# Patient Record
Sex: FEMALE | Race: WHITE | NOT HISPANIC OR LATINO | Employment: OTHER | URBAN - METROPOLITAN AREA
[De-identification: names, ages, dates, MRNs, and addresses within clinical notes are randomized per-mention and may not be internally consistent; named-entity substitution may affect disease eponyms.]

---

## 2017-01-24 ENCOUNTER — TRANSFERRED RECORDS (OUTPATIENT)
Dept: HEALTH INFORMATION MANAGEMENT | Facility: CLINIC | Age: 57
End: 2017-01-24

## 2017-02-16 ENCOUNTER — OFFICE VISIT (OUTPATIENT)
Dept: FAMILY MEDICINE | Facility: CLINIC | Age: 57
End: 2017-02-16
Payer: COMMERCIAL

## 2017-02-16 VITALS
BODY MASS INDEX: 33.32 KG/M2 | WEIGHT: 176.5 LBS | TEMPERATURE: 97.9 F | OXYGEN SATURATION: 95 % | DIASTOLIC BLOOD PRESSURE: 78 MMHG | HEART RATE: 87 BPM | SYSTOLIC BLOOD PRESSURE: 120 MMHG | HEIGHT: 61 IN

## 2017-02-16 DIAGNOSIS — C50.911 MALIGNANT NEOPLASM OF RIGHT FEMALE BREAST, UNSPECIFIED SITE OF BREAST: Primary | ICD-10-CM

## 2017-02-16 DIAGNOSIS — Z83.2 FAMILY HISTORY OF FACTOR V LEIDEN MUTATION: ICD-10-CM

## 2017-02-16 PROCEDURE — 81241 F5 GENE: CPT | Performed by: FAMILY MEDICINE

## 2017-02-16 PROCEDURE — 99214 OFFICE O/P EST MOD 30 MIN: CPT | Performed by: FAMILY MEDICINE

## 2017-02-16 PROCEDURE — 36415 COLL VENOUS BLD VENIPUNCTURE: CPT | Performed by: FAMILY MEDICINE

## 2017-02-16 ASSESSMENT — ANXIETY QUESTIONNAIRES
7. FEELING AFRAID AS IF SOMETHING AWFUL MIGHT HAPPEN: NOT AT ALL
1. FEELING NERVOUS, ANXIOUS, OR ON EDGE: NOT AT ALL
5. BEING SO RESTLESS THAT IT IS HARD TO SIT STILL: NOT AT ALL
GAD7 TOTAL SCORE: 2
3. WORRYING TOO MUCH ABOUT DIFFERENT THINGS: NOT AT ALL
6. BECOMING EASILY ANNOYED OR IRRITABLE: NOT AT ALL
2. NOT BEING ABLE TO STOP OR CONTROL WORRYING: SEVERAL DAYS

## 2017-02-16 ASSESSMENT — PATIENT HEALTH QUESTIONNAIRE - PHQ9: 5. POOR APPETITE OR OVEREATING: SEVERAL DAYS

## 2017-02-16 NOTE — LETTER
February 23, 2017      Teresa Hahn  [address]  [facility] [address]        Dear MsEdward Hahn,    Please send her a copy of the report.    Enclosed is a copy of your recent results.    It looks like you are negative for Factor 5 Leiden.    Keep in touch!    Sincerely,     Yocasta Moreno MD

## 2017-02-16 NOTE — NURSING NOTE
"Chief Complaint   Patient presents with     Consult     questions regarding breast cancer        Initial /78 (BP Location: Right arm, Patient Position: Chair, Cuff Size: Adult Regular)  Pulse 87  Temp 97.9  F (36.6  C) (Oral)  Ht 5' 0.75\" (1.543 m)  Wt 176 lb 8 oz (80.1 kg)  SpO2 95%  BMI 33.62 kg/m2 Estimated body mass index is 33.62 kg/(m^2) as calculated from the following:    Height as of this encounter: 5' 0.75\" (1.543 m).    Weight as of this encounter: 176 lb 8 oz (80.1 kg).  Medication Reconciliation: complete   Lashawn Rob, VERA      "

## 2017-02-17 ENCOUNTER — CARE COORDINATION (OUTPATIENT)
Dept: CARE COORDINATION | Facility: CLINIC | Age: 57
End: 2017-02-17

## 2017-02-17 ASSESSMENT — ANXIETY QUESTIONNAIRES: GAD7 TOTAL SCORE: 2

## 2017-02-17 ASSESSMENT — PATIENT HEALTH QUESTIONNAIRE - PHQ9: SUM OF ALL RESPONSES TO PHQ QUESTIONS 1-9: 1

## 2017-02-17 NOTE — LETTER
Health Care Home - Access Care Plan    About Me  Patient Name:  Teresa Hahn    YOB: 1960  Age:                            56 year old   [place] MRN:         [id number] Telephone Information:     Home Phone [phone]   Mobile [phone]       Address:    [address]  [facility] [address] Email address:  [email or web address]      Emergency Contact(s)  Name Relationship Lgl Grd Work Phone Home Phone Mobile Phone   NAN COVINGTON Spouse   [phone]             Health Maintenance:      My Access Plan  Medical Emergency 911   Questions or concerns during clinic hours Primary Clinic Line, I will call the clinic directly:     24 Hour Appointment Line [phone] or  [phone])  (toll free)   24 Hour Nurse Line [phone] (toll free)   Questions or concerns outside clinic hours 24 Hour Appointment Line, I will call the after-hours on-call line:   [facility] [phone] or [phone]) (toll-free)   Preferred Urgent Care     Preferred Hospital     Preferred Pharmacy [facility] [place] [address]  [address]     Behavioral Health Crisis Line [facility], [phone] or [phone]     My Care Team Members  Patient Care Team       Relationship Specialty Notifications Start End    Yocasta Moreno MD PCP - General   1/14/00     Phone: [phone] Fax: [phone]         [facility] [address] [facility] [address]    Masters, Jeniffer NAPOLES, RN Clinic Care Coordinator  Admissions 2/17/17     Comment:  Ph: [phone]        My Medical and Care Information  Problem List   Patient Active Problem List   Diagnosis     Acute reaction to stress     Arthropathy     Obesity     GERD (gastroesophageal reflux disease)     CARDIOVASCULAR SCREENING; LDL GOAL LESS THAN 160     Anxiety     Health Care Home     TBI (traumatic brain injury) (H)     ADD (attention deficit disorder)     Major depressive disorder,  single episode in full remission (H)      Current Medications and Allergies:  See printed Medication Report

## 2017-02-17 NOTE — PROGRESS NOTES
"Clinic Care Coordination Contact  OUTREACH    Referral Information:  Referral Source: PCP  Reason for Contact: Initial outreach by CCRN to patient for the following referral from PCP:    Reason for Referral: Other: recent dx breast ca. Interested in resources, especially within [facility], such as support group or other.     Provide additional details for Care Coordination to best meet the patient's current needs: provide resources for new dx breast ca.    Clinical Staff have discussed the Care Coordination Referral with the patient and/or caregiver: yes    Care Conference: No     Universal Utilization:   ED Visits in last year: 0  Hospital visits in last year: 0  Last PCP appointment: 02/16/17  Missed Appointments:  (N/A)  Concerns: Resources for BRCA - support, education, coping, etc.  Multiple Providers or Specialists: Yes    Clinical Concerns:  Current Medical Concerns:    Patient recently diagnosed with BRCA.   See 02/16/2017 OV notes by PCP for further details.     Current Behavioral Concerns: Patient requesting resources that will aide with her own ability to cope with new diagnosis, in addition to resources that will help her to communicate with her family during this time.   She states that she is \"still coming to terms\" with the diagnosis as it is so new to her and her family.      Education Provided to patient:   CCRN discussed that each person leif differently, but that I will mail her resources to assist in her ability to cope in a way that she feels meets her and her family needs.   Clinical Pathway Name: None      Medication Management:  N/A     Functional Status:  Mobility Status: Independent  Equipment Currently Used at Home: none        Psychosocial:  Current living arrangement:: I live in a private home with family  Financial/Insurance: [facility] [place]   Patient states that she lives with her spouse.    She has 4 children -  25 y/o daughter (, no kids of her own yet), 21 y/o son (college), 20 y/o son " (currently in [place] for [employer]), 18 y/o son (lives at home).        Resources and Interventions:  Current Resources:  (N/A);  (N/A)  PAS Number:  (N/A)  Senior Linkage Line Referral Placed:  (N/A)  Advanced Care Plans/Directives on file:: No  Referrals Placed: Community Resources     Goals:   Goal 1 Statement: I will review resources for BRCA and outreach to the ones that I feel support my needs at this time.   Goal 1 Progression Percent: 20%  Goal 1 Progression Date: 02/17/17     Frequency of Care Coordination: Q 2-4 weeks  Upcoming appointment: 03/07/17 (Preop with PCP )        Plan:  [facility] Care Plan, copy of DAP (see letters - dated 08/11/2016) and Rani Baker - [place] Cancer Resources list and Breastcancer.org (website) information mailed to home address on file.   CCRN will outreach to patient in 2 weeks to allow her time to review resources personally and with her family.  Will remain available to patient in that timeframe should she need anything.       Patient/Caregiver understanding: Patient agreed with plan.     Jeniffer Veliz, GISELLA  [facility]  Clinic Care Coordinator - [facility] Locations   Direct:  [phone] (voicemail available)

## 2017-02-21 ENCOUNTER — TRANSFERRED RECORDS (OUTPATIENT)
Dept: HEALTH INFORMATION MANAGEMENT | Facility: CLINIC | Age: 57
End: 2017-02-21

## 2017-02-21 LAB — COPATH REPORT: NORMAL

## 2017-02-22 ENCOUNTER — ALLIED HEALTH/NURSE VISIT (OUTPATIENT)
Dept: NURSING | Facility: CLINIC | Age: 57
End: 2017-02-22
Payer: COMMERCIAL

## 2017-02-22 DIAGNOSIS — Z23 NEED FOR PROPHYLACTIC VACCINATION AND INOCULATION AGAINST INFLUENZA: Primary | ICD-10-CM

## 2017-02-22 PROBLEM — C50.911 MALIGNANT NEOPLASM OF RIGHT FEMALE BREAST, UNSPECIFIED SITE OF BREAST: Status: ACTIVE | Noted: 2017-02-22

## 2017-02-22 PROCEDURE — 99207 ZZC NO CHARGE NURSE ONLY: CPT

## 2017-02-22 PROCEDURE — 90686 IIV4 VACC NO PRSV 0.5 ML IM: CPT

## 2017-02-22 PROCEDURE — 90471 IMMUNIZATION ADMIN: CPT

## 2017-02-22 NOTE — PROGRESS NOTES
Injectable Influenza Immunization Documentation    1.  Is the person to be vaccinated sick today?  No    2. Does the person to be vaccinated have an allergy to eggs or to a component of the vaccine?  No    3. Has the person to be vaccinated today ever had a serious reaction to influenza vaccine in the past?  No    4. Has the person to be vaccinated ever had Guillain-Milford syndrome?  No     Form completed by Anna Felton MA

## 2017-02-22 NOTE — MR AVS SNAPSHOT
"              After Visit Summary   2/22/2017    Teresa Hahn    MRN: [id number]           Patient Information     Date Of Birth          1960        Visit Information        Provider Department      2/22/2017 11:00 AM  NURSE [facility]        Today's Diagnoses     Need for prophylactic vaccination and inoculation against influenza    -  1       Follow-ups after your visit        Your next 10 appointments already scheduled     Mar 07, 2017 10:10 AM CST   Pre-Op physical with Yocasta Moreno MD   [facility]    [address] [facility] [address] [phone]              Who to contact     If you have questions or need follow up information about today's clinic visit or your schedule please contact [facility] directly at [phone].  Normal or non-critical lab and imaging results will be communicated to you by MyChart, letter or phone within 4 business days after the clinic has received the results. If you do not hear from us within 7 days, please contact the clinic through MyChart or phone. If you have a critical or abnormal lab result, we will notify you by phone as soon as possible.  Submit refill requests through [employer] or call your pharmacy and they will forward the refill request to us. Please allow 3 business days for your refill to be completed.          Additional Information About Your Visit        MyChart Information     [employer] lets you send messages to your doctor, view your test results, renew your prescriptions, schedule appointments and more. To sign up, go to [email or web address] . Click on \"Log in\" on the left side of the screen, which will take you to the Welcome page. Then click on \"Sign up Now\" on the right side of the page.     You will be asked to enter the access code listed below, as well as some personal information. Please follow the directions to create your username and password.     Your " access code is: [email or web address]  Expires: 2017  4:33 PM     Your access code will  in 90 days. If you need help or a new code, please call your [place] clinic or [phone].        Care EveryWhere ID     This is your Care EveryWhere ID. This could be used by other organizations to access your [facility] medical records  [id number]         Blood Pressure from Last 3 Encounters:   17 120/78   16 120/70   16 102/70    Weight from Last 3 Encounters:   17 176 lb 8 oz (80.1 kg)   16 175 lb 9.6 oz (79.7 kg)   16 175 lb 6.4 oz (79.6 kg)              We Performed the Following     FLU VAC, SPLIT VIRUS IM > 3 YO (QUADRIVALENT) [91654]     Vaccine Administration, Initial [83599]        Primary Care Provider Office Phone # Fax #    Yocasta Moreno [phone]       [facility] [address] [facility] [address]        Thank you!     Thank you for choosing [facility]  for your care. Our goal is always to provide you with excellent care. Hearing back from our patients is one way we can continue to improve our services. Please take a few minutes to complete the written survey that you may receive in the mail after your visit with us. Thank you!             Your Updated Medication List - Protect others around you: Learn how to safely use, store and throw away your medicines at www.disposemymeds.org.      Notice  As of 2017 11:14 AM    You have not been prescribed any medications.

## 2017-03-01 ENCOUNTER — TRANSFERRED RECORDS (OUTPATIENT)
Dept: HEALTH INFORMATION MANAGEMENT | Facility: CLINIC | Age: 57
End: 2017-03-01

## 2017-03-07 ENCOUNTER — OFFICE VISIT (OUTPATIENT)
Dept: FAMILY MEDICINE | Facility: CLINIC | Age: 57
End: 2017-03-07
Payer: COMMERCIAL

## 2017-03-07 VITALS
DIASTOLIC BLOOD PRESSURE: 74 MMHG | RESPIRATION RATE: 16 BRPM | TEMPERATURE: 98 F | BODY MASS INDEX: 33.27 KG/M2 | WEIGHT: 176.2 LBS | SYSTOLIC BLOOD PRESSURE: 118 MMHG | OXYGEN SATURATION: 97 % | HEIGHT: 61 IN | HEART RATE: 79 BPM

## 2017-03-07 DIAGNOSIS — C50.911 MALIGNANT NEOPLASM OF RIGHT FEMALE BREAST, UNSPECIFIED SITE OF BREAST: ICD-10-CM

## 2017-03-07 DIAGNOSIS — Z87.898 HISTORY OF ANESTHESIA REACTION: ICD-10-CM

## 2017-03-07 DIAGNOSIS — R11.2 PONV (POSTOPERATIVE NAUSEA AND VOMITING): ICD-10-CM

## 2017-03-07 DIAGNOSIS — Z98.890 PONV (POSTOPERATIVE NAUSEA AND VOMITING): ICD-10-CM

## 2017-03-07 DIAGNOSIS — Z01.818 PREOP GENERAL PHYSICAL EXAM: Primary | ICD-10-CM

## 2017-03-07 LAB
ERYTHROCYTE [DISTWIDTH] IN BLOOD BY AUTOMATED COUNT: 12.8 % (ref 10–15)
HCT VFR BLD AUTO: 44.3 % (ref 35–47)
HGB BLD-MCNC: 15 G/DL (ref 11.7–15.7)
MCH RBC QN AUTO: 28.8 PG (ref 26.5–33)
MCHC RBC AUTO-ENTMCNC: 33.9 G/DL (ref 31.5–36.5)
MCV RBC AUTO: 85 FL (ref 78–100)
PLATELET # BLD AUTO: 170 10E9/L (ref 150–450)
RBC # BLD AUTO: 5.2 10E12/L (ref 3.8–5.2)
WBC # BLD AUTO: 8.5 10E9/L (ref 4–11)

## 2017-03-07 PROCEDURE — 99214 OFFICE O/P EST MOD 30 MIN: CPT | Performed by: FAMILY MEDICINE

## 2017-03-07 PROCEDURE — 85027 COMPLETE CBC AUTOMATED: CPT | Performed by: FAMILY MEDICINE

## 2017-03-07 PROCEDURE — 36415 COLL VENOUS BLD VENIPUNCTURE: CPT | Performed by: FAMILY MEDICINE

## 2017-03-07 NOTE — MR AVS SNAPSHOT
After Visit Summary   3/7/2017    Teresa Hahn    MRN: [id number]           Patient Information     Date Of Birth          1960        Visit Information        Provider Department      3/7/2017 10:10 AM Yocasta Moreno MD [facility]        Today's Diagnoses     Preop general physical exam    -  1    Malignant neoplasm of right female breast, unspecified site of breast (H)        History of anesthesia reaction        PONV (postoperative nausea and vomiting)          Care Instructions      Before Your Surgery      Call your surgeon if there is any change in your health. This includes signs of a cold or flu (such as a sore throat, runny nose, cough, rash or fever).    Do not smoke, drink alcohol or take over the counter medicine (unless your surgeon or primary care doctor tells you to) for the 24 hours before and after surgery.    If you take prescribed drugs: Follow your doctor s orders about which medicines to take and which to stop until after surgery.    Eating and drinking prior to surgery: follow the instructions from your surgeon    Take a shower or bath the night before surgery. Use the soap your surgeon gave you to gently clean your skin. If you do not have soap from your surgeon, use your regular soap. Do not shave or scrub the surgery site.  Wear clean pajamas and have clean sheets on your bed.         Follow-ups after your visit        Who to contact     If you have questions or need follow up information about today's clinic visit or your schedule please contact [facility] directly at [phone].  Normal or non-critical lab and imaging results will be communicated to you by MyChart, letter or phone within 4 business days after the clinic has received the results. If you do not hear from us within 7 days, please contact the clinic through MyChart or phone. If you have a critical or abnormal lab result, we will notify you by phone as soon as  "possible.  Submit refill requests through [employer] or call your pharmacy and they will forward the refill request to us. Please allow 3 business days for your refill to be completed.          Additional Information About Your Visit        [email or web address] Information     [employer] lets you send messages to your doctor, view your test results, renew your prescriptions, schedule appointments and more. To sign up, go to [email or web address] . Click on \"Log in\" on the left side of the screen, which will take you to the Welcome page. Then click on \"Sign up Now\" on the right side of the page.     You will be asked to enter the access code listed below, as well as some personal information. Please follow the directions to create your username and password.     Your access code is: [email or web address]  Expires: 2017  4:33 PM     Your access code will  in 90 days. If you need help or a new code, please call your [place] clinic or [phone].        Care EveryWhere ID     This is your Care EveryWhere ID. This could be used by other organizations to access your [place] medical records  [id number]        Your Vitals Were     Pulse Temperature Respirations Height Pulse Oximetry BMI (Body Mass Index)    79 98  F (36.7  C) (Oral) 16 5' 0.75\" (1.543 m) 97% 33.57 kg/m2       Blood Pressure from Last 3 Encounters:   17 118/74   17 120/78   16 120/70    Weight from Last 3 Encounters:   17 176 lb 3.2 oz (79.9 kg)   17 176 lb 8 oz (80.1 kg)   16 175 lb 9.6 oz (79.7 kg)              We Performed the Following     CBC with platelets        Primary Care Provider Office Phone # Fax #    Yocasta Moreno [phone]       [facility] [address]  [facility] [address]        Thank you!     Thank you for choosing [facility]  for your care. Our goal is always to provide you with excellent care. Hearing back from our patients is one way we can continue to " improve our services. Please take a few minutes to complete the written survey that you may receive in the mail after your visit with us. Thank you!             Your Updated Medication List - Protect others around you: Learn how to safely use, store and throw away your medicines at www.disposemymeds.org.      Notice  As of 3/7/2017 11:02 AM    You have not been prescribed any medications.

## 2017-03-07 NOTE — LETTER
March 7, 2017      Teresa Hahn  [address]  [facility] [address]        Dear MsEdward Hahn,    Enclosed is a copy of your recent results.    All looks normal.    I hope you have a smooth recovery after surgery!    Keep in touch!    Sincerely,     Yocasta Moreno MD

## 2017-03-07 NOTE — NURSING NOTE
"Chief Complaint   Patient presents with     Pre-Op Exam       Initial /74 (BP Location: Right arm, Patient Position: Chair, Cuff Size: Adult Large)  Pulse 79  Temp 98  F (36.7  C) (Oral)  Resp 16  Ht 5' 0.75\" (1.543 m)  Wt 176 lb 3.2 oz (79.9 kg)  SpO2 97%  BMI 33.57 kg/m2 Estimated body mass index is 33.57 kg/(m^2) as calculated from the following:    Height as of this encounter: 5' 0.75\" (1.543 m).    Weight as of this encounter: 176 lb 3.2 oz (79.9 kg).  Medication Reconciliation: complete   Anna Felton MA      "

## 2017-03-07 NOTE — PROGRESS NOTES
[facility]  [address] [facility] [address] [phone]  Dept: [phone]    PRE-OP EVALUATION:  Today's date: 3/7/2017    Teresa Hahn (: 1960) presents for pre-operative evaluation assessment as requested by Dr. Ross and Dr. Gould.  She requires evaluation and anesthesia risk assessment prior to undergoing surgery/procedure for treatment .  Proposed procedure: Mastectomy and Reconstruction.    Date of Surgery/ Procedure: 17 - Mastectomy and Reconstruction   Time of Surgery/ Procedure: 7:30am  Hospital/Surgical Facility: [facility]   Fax number for surgical facility: [phone] or [phone]  Primary Physician: Yocasta Moreno  Type of Anesthesia Anticipated: General    Patient has a Health Care Directive or Living Will:  NO    1. NO - Do you have a history of heart attack, stroke, stent, bypass or surgery on an artery in the head, neck, heart or legs?  2. NO - Do you ever have any pain or discomfort in your chest?  3. NO - Do you have a history of  Heart Failure?  4. NO - Are you troubled by shortness of breath when: walking on the level, up a slight hill or at night?  5. NO - Do you currently have a cold, bronchitis or other respiratory infection?  6. NO - Do you have a cough, shortness of breath or wheezing?  7. YES - believes out of shape. Do you sometimes get pains in the calves of your legs when you walk?  8. NO - Do you or anyone in your family have previous history of blood clots?  9. NO - Do you or does anyone in your family have a serious bleeding problem such as prolonged bleeding following surgeries or cuts?  10. NO - Have you ever had problems with anemia or been told to take iron pills?  11. NO - Have you had any abnormal blood loss such as black, tarry or bloody stools, or abnormal vaginal bleeding?  12. YES - 3rd childbirth had bleeding.  Have you ever had a blood transfusion?  13. YES - has had itching and low blood  pressure to something. Has seen Allergist; they have requested records from [facility] and from the colonoscopy and from ankle surgery.  First incidence was after child 4 was born.  Also in 2012 from colonoscopy; had itchiness.  Was put in trendelenburg.  No problems with ankle fracture.   Have you or any of your relatives ever had problems with anesthesia?  14. YES - snoring. Believes wakes self up. Has not had a sleep eval.                     Do you have sleep apnea, excessive snoring or daytime drowsiness?  15. NO - Do you have any prosthetic heart valves?  16. NO - Do you have prosthetic joints?  17. NO - Is there any chance that you may be pregnant?      HPI:                                                      Brief HPI related to upcoming procedure: was found to have breast cancer.       See problem list for active medical problems.  Problems all longstanding and stable, except as noted/documented.  See ROS for pertinent symptoms related to these conditions.                                                                                                  .    MEDICAL HISTORY:                                                      Patient Active Problem List    Diagnosis Date Noted     Malignant neoplasm of right female breast, unspecified site of breast (H) 02/22/2017     Priority: Medium     Major depressive disorder, single episode in full remission (H) 04/09/2014     ADD (attention deficit disorder) 04/12/2013     TBI (traumatic brain injury) (H) 12/28/2012     age 2. coma x 3 weeks.       Health Care Home 11/10/2010     Care Coordination Post ED Assessment    Utilization:  No utilization concerns.  Has f/u with primary clinic tomorrow.    Disease State:  No noticeable improvement in the palsy since beginning treatment.  She had many questions about Bell's Palsy, and education was provided.  Patient stated understanding.        Medications:  Educated patient on indications for treatment with Valtrex, and  Prednisone, and reviewed potential side effects.  Patient stated understanding.    Functionality:No concerns.    Psychosocial:Patient is in good spirits.      Plan:  No further follow up needed at this time.   Bambi Bill RN, PHN,[facility]            DX V65.8 REPLACED WITH 82864 HEALTH CARE HOME (04/08/2013)       Anxiety 05/18/2010     CARDIOVASCULAR SCREENING; LDL GOAL LESS THAN 160 02/10/2010     GERD (gastroesophageal reflux disease) 05/20/2008     Obesity 07/20/2007     Problem list name updated by automated process. Provider to review       Arthropathy      from right ankle fracture.  Problem list name updated by automated process. Provider to review       Acute reaction to stress 04/19/2006     Problem list name updated by automated process. Provider to review        Past Medical History   Diagnosis Date     arthritis      from right ankle fracture.     TBI (traumatic brain injury) (H) 12/28/2012     age 2. coma x 3 weeks.      Past Surgical History   Procedure Laterality Date     Surgical history of -   12/2004     r leg broken in several areas; now arthritis. 3 surgeries     Surgical history of -   2008     right ankle fusion     No current outpatient prescriptions on file.     OTC products: None, except as noted above    No Known Allergies   Latex Allergy: NO    Social History   Substance Use Topics     Smoking status: Never Smoker     Smokeless tobacco: Never Used     Alcohol use Yes      Comment: occasional     History   Drug Use No       REVIEW OF SYSTEMS:                                                    C: NEGATIVE for fever, chills, change in weight  E/M: NEGATIVE for ear, mouth and throat problems x when wakes up in am.  R: NEGATIVE for significant cough or SOB  CV: NEGATIVE for chest pain, palpitations or peripheral edema  No nausea, vomitting or change in bowel habits.  No urinary symptoms.      EXAM:                                                    /74 (BP Location: Right arm, Patient  "Position: Chair, Cuff Size: Adult Large)  Pulse 79  Temp 98  F (36.7  C) (Oral)  Resp 16  Ht 5' 0.75\" (1.543 m)  Wt 176 lb 3.2 oz (79.9 kg)  SpO2 97%  BMI 33.57 kg/m2  GENERAL APPEARANCE: alert and no distress  HENT: ear canals and TM's normal and nose and mouth without ulcers or lesions  RESP: lungs clear to auscultation - no rales, rhonchi or wheezes  CV: regular rates and rhythm  ABDOMEN: soft, nontender, no HSM or masses and bowel sounds normal  MS: extremities normal- no gross deformities noted  NEURO: Normal strength and tone, sensory exam grossly normal, mentation intact and speech normal  PSYCH: mentation appears normal and affect normal/bright    DIAGNOSTICS:                                                      Labs Resulted Today:   Results for orders placed or performed in visit on 03/07/17   CBC with platelets   Result Value Ref Range    WBC 8.5 4.0 - 11.0 10e9/L    RBC Count 5.20 3.8 - 5.2 10e12/L    Hemoglobin 15.0 11.7 - 15.7 g/dL    Hematocrit 44.3 35.0 - 47.0 %    MCV 85 78 - 100 fl    MCH 28.8 26.5 - 33.0 pg    MCHC 33.9 31.5 - 36.5 g/dL    RDW 12.8 10.0 - 15.0 %    Platelet Count 170 150 - 450 10e9/L       Recent Labs   Lab Test  11/05/10   1120   HGB  13.4   PLT  174   NA  140   POTASSIUM  4.2   CR  0.81        IMPRESSION:                                                    Reason for surgery/procedure: breast cancer.    The proposed surgical procedure is considered INTERMEDIATE risk.    REVISED CARDIAC RISK INDEX  The patient has the following serious cardiovascular risks for perioperative complications such as (MI, PE, VFib and 3  AV Block):  No serious cardiac risks  INTERPRETATION: 0 risks: Class I (very low risk - 0.4% complication rate)    The patient has the following additional risks for perioperative complications:        Preop general physical exam      Malignant neoplasm of right female breast, unspecified site of breast (H)  Anticipating surgery.    History of anesthesia reaction  see " history above (#13); has had some itching and low blood pressure with some of her previous procedures.  she has pursued seeing an allergist who is reviewing her records related to itching and low blood pressure.   She will connect with the Allergist prior to surgery.    PONV (postoperative nausea and vomiting)  For Anesthesia to be aware .      RECOMMENDATIONS:                                                        Obstructive Sleep Apnea (or suspected sleep apnea)  Hospital staff are advised to monitor for sleep related oxygen desaturations due to suspicion of SHARDA          APPROVAL GIVEN to proceed with proposed procedure, without further diagnostic evaluation       Signed Electronically by: Yocasta Moreno MD, MD    Copy of this evaluation report is provided to requesting physician.    [place] Preop Guidelines

## 2017-03-09 ENCOUNTER — CARE COORDINATION (OUTPATIENT)
Dept: CARE COORDINATION | Facility: CLINIC | Age: 57
End: 2017-03-09

## 2017-03-09 NOTE — PROGRESS NOTES
NOTE:  Per 2017 OV notes by Dr. Moreno -   PRE-OP EVALUATION:  Today's date: 3/7/2017     Teresa Hahn (: 1960) presents for pre-operative evaluation assessment as requested by Dr. Ross and Dr. Gould. She requires evaluation and anesthesia risk assessment prior to undergoing surgery/procedure for treatment . Proposed procedure: Mastectomy and Reconstruction.     Date of Surgery/ Procedure: 17 - Mastectomy and Reconstruction   Time of Surgery/ Procedure: 7:30am  Hospital/Surgical Facility: [facility]   Fax number for surgical facility: [phone] or [phone]  Primary Physician: Yocasta Moreno  Type of Anesthesia Anticipated: General    _________________________________________________________________________________________________________________________________    Clinic Care Coordination Contact  OUTREACH    Referral Information:  Referral Source: PCP  Reason for Contact:   Follow up.          Universal Utilization:   ED Visits in last year: 0  Hospital visits in last year: 0  Last PCP appointment: 17 (Preop with Dr. Moreno )  Missed Appointments:  (N/A)  Concerns: Resources for BRCA - support, education, coping, etc.  Multiple Providers or Specialists: Yes    Clinical Concerns:  Current Medical Concerns: See above - patient is scheduled to have unilateral mastectomy (RIGHT breast) on 2017.    She is concerned about postoperative pain and how she will manage that.   She may be interested in Integrated Medicine or Pain Management postoperatively - would like to potentially have Healing Touch/massage therapy, aromatherapy, etc. As mechanisms in her treatment plan to cope with pain.   She verbalized understanding that some of these modalities may not be covered by insurance.     She is additionally concerned about how she will sleep once the surgery is done - she is typically a side sleeper, but noted that she will need to sleep on her back for a few weeks  "afterwards.   CCRN emphasized importance of having a proper pillow for best support while sleeping on back.   Advised that pain may be the biggest factor in this process as typically, pain is a trigger for wakefullness, especially when trying to sleep in an improper position.      She is experimenting with different pillows at this time.   We discussed the possibility that her IP Care Team may recommend Home Care after her surgery, but that will be at their discretion based on how she is doing before DC to home.   She verbalized understanding.     Current Behavioral Concerns:   Patient is coping well at this time with her plan for surgical intervention of BRCA.     She made the decision to have a UNILATERAL mastectomy to affected breast only at this time.   She states that it had to be \"meaningful to me, not just medically\".    Patient states that she has been \"circling back\" on her decision and has had personal reaffirmations of decision-making process.  She is focusing on \"life adjustments\" and looking to find focus postoperatively of \"not just doing but being.\"   She made the choice to have her procedure within the [place] metro area as it was significantly important for her to have the support of her 26 y/o daughter present for her as well.   \"I really wanted her to be a part of this process as this will be part of her story too.\"   Patient has been working on projects in the home this past week and leading up to the surgery - cleaned bedroom, working on decorating bedroom, etc - to make surroundings most comfortable.   She is hoping to do \"something fun\" this weekend as this will be her last pain-free weekend for awhile.       Education Provided to patient: CCRN provided patient with affirmations to her points made above.  Advised searching for pillows for back-sleep in addition to postoperative mastectomy pillows - reviewed these may vary based on personal preference and price point.    Encouraged patient " to continue to explore and verbalize her feelings through this process for best coping.       Clinical Pathway Name: None    Medication Management:  No new concerns identified at this time.     Functional Status:  Mobility Status: Independent  Equipment Currently Used at Home: none      Psychosocial:  Current living arrangement:: I live in a private home with family  Financial/Insurance:  [employer] [place]     Resources and Interventions:  Current Resources:  (N/A);  (N/A)  PAS Number:  (N/A)  Senior Linkage Line Referral Placed:  (N/A)  Advanced Care Plans/Directives on file:: No  Referrals Placed: Community Resources     Goals:   Goal 1 Statement: I will review resources for BRCA and outreach to the ones that I feel support my needs at this time.     Frequency of Care Coordination: Q 2-4 weeks        Plan:  CCRN will outreach to patient in 2-3 weeks after surgical procedure and will remain available to patient in that timeframe should she need anything.       Patient/Caregiver understanding: Patient agreed with plan.     Jeniffer Veliz, RN  [facility]  Clinic Care Coordinator - [place] and [place] Locations   Direct:  [phone] (voicemail available)

## 2017-03-16 ENCOUNTER — TRANSFERRED RECORDS (OUTPATIENT)
Dept: HEALTH INFORMATION MANAGEMENT | Facility: CLINIC | Age: 57
End: 2017-03-16

## 2017-03-17 LAB
CREAT SERPL-MCNC: 0.72 MG/DL (ref 0.57–1.11)
GFR SERPL CREATININE-BSD FRML MDRD: >60 ML/MIN/1.73M2
HBA1C MFR BLD: 9.2 % (ref 0–5.7)
POTASSIUM SERPL-SCNC: 3.7 MMOL/L (ref 3.5–5)

## 2017-03-21 ENCOUNTER — OFFICE VISIT (OUTPATIENT)
Dept: FAMILY MEDICINE | Facility: CLINIC | Age: 57
End: 2017-03-21
Payer: COMMERCIAL

## 2017-03-21 VITALS
OXYGEN SATURATION: 97 % | DIASTOLIC BLOOD PRESSURE: 80 MMHG | SYSTOLIC BLOOD PRESSURE: 122 MMHG | HEART RATE: 89 BPM | BODY MASS INDEX: 33.76 KG/M2 | WEIGHT: 178.8 LBS | HEIGHT: 61 IN | TEMPERATURE: 97.8 F

## 2017-03-21 DIAGNOSIS — E11.65 TYPE 2 DIABETES MELLITUS WITH HYPERGLYCEMIA, WITHOUT LONG-TERM CURRENT USE OF INSULIN (H): ICD-10-CM

## 2017-03-21 DIAGNOSIS — Z09 HOSPITAL DISCHARGE FOLLOW-UP: Primary | ICD-10-CM

## 2017-03-21 DIAGNOSIS — C50.911 MALIGNANT NEOPLASM OF RIGHT FEMALE BREAST, UNSPECIFIED SITE OF BREAST: ICD-10-CM

## 2017-03-21 PROCEDURE — 99214 OFFICE O/P EST MOD 30 MIN: CPT | Performed by: FAMILY MEDICINE

## 2017-03-21 RX ORDER — CLINDAMYCIN HCL 150 MG
300 CAPSULE ORAL 3 TIMES DAILY
Refills: 0 | COMMUNITY
Start: 2017-03-21 | End: 2017-04-24

## 2017-03-21 RX ORDER — METHOCARBAMOL 750 MG/1
TABLET, FILM COATED ORAL
Refills: 0 | COMMUNITY
Start: 2017-03-19 | End: 2017-03-21

## 2017-03-21 RX ORDER — METHOCARBAMOL 750 MG/1
TABLET, FILM COATED ORAL 4 TIMES DAILY PRN
Qty: 180 TABLET | COMMUNITY
Start: 2017-03-21 | End: 2017-04-24

## 2017-03-21 RX ORDER — ONDANSETRON 4 MG/1
TABLET, ORALLY DISINTEGRATING ORAL
Refills: 0 | COMMUNITY
Start: 2017-03-19 | End: 2017-12-07

## 2017-03-21 RX ORDER — HYDROCODONE BITARTRATE AND ACETAMINOPHEN 5; 325 MG/1; MG/1
1-2 TABLET ORAL EVERY 4 HOURS PRN
Refills: 0 | COMMUNITY
Start: 2017-03-21 | End: 2017-04-24

## 2017-03-21 RX ORDER — OXYCODONE HYDROCHLORIDE 5 MG/1
TABLET ORAL
Refills: 0 | COMMUNITY
Start: 2017-03-19 | End: 2017-03-21 | Stop reason: SINTOL

## 2017-03-21 RX ORDER — ASPIRIN 81 MG/1
81 TABLET, CHEWABLE ORAL DAILY
Qty: 36 TABLET | Refills: 0 | COMMUNITY
Start: 2017-03-21 | End: 2017-04-24

## 2017-03-21 RX ORDER — LANCETS
EACH MISCELLANEOUS
Refills: 0 | COMMUNITY
Start: 2017-03-19 | End: 2017-12-07

## 2017-03-21 RX ORDER — CLINDAMYCIN HCL 150 MG
CAPSULE ORAL
Refills: 0 | COMMUNITY
Start: 2017-03-19 | End: 2017-03-21

## 2017-03-21 RX ORDER — ASPIRIN 81 MG/1
TABLET, CHEWABLE ORAL
Refills: 0 | COMMUNITY
Start: 2017-03-19 | End: 2017-03-21

## 2017-03-21 RX ORDER — HYDROCODONE BITARTRATE AND ACETAMINOPHEN 5; 325 MG/1; MG/1
TABLET ORAL
Refills: 0 | COMMUNITY
Start: 2017-03-19 | End: 2017-03-21

## 2017-03-21 NOTE — Clinical Note
Hi! I am not sure if I can code this way; I have heard conflicting things about use of a transitional code when there is a surgeon involved. She was complex with a new dx diabetes as well.  Let me know :-).  Thanks!!! Yocasta

## 2017-03-21 NOTE — MR AVS SNAPSHOT
After Visit Summary   3/21/2017    Teresa Hahn    MRN: [id number]           Patient Information     Date Of Birth          1960        Visit Information        Provider Department      3/21/2017 2:50 PM Yocasta Moreno MD [facility]        Today's Diagnoses     Hospital discharge follow-up    -  1    Malignant neoplasm of right female breast, unspecified site of breast (H)        Type 2 diabetes mellitus with hyperglycemia, without long-term current use of insulin (H)           Follow-ups after your visit        Additional Services     DIABETES EDUCATOR REFERRAL       DIABETES SELF MANAGEMENT TRAINING (DSMT)      Your provider has referred you to Diabetes Education: FMG: Diabetes Education - [facility] [phone]   [email or web address]    Type of training and number of hours: New Diagnosis: Initial group DSMT - 10 hours.      Medicare covers: 10 hours of initial DSMT in 12 month period from the time of first visit, plus 2 hours of follow-up DSMT annually, and additional hours as requested for insulin training.    Diabetes Type: Type 2 - On Oral Medication             Diabetes Co-Morbidities: none               A1C Goal:  <7.0       A1C is: No results found for: A1C    If an urgent visit is needed or A1C is above 12, Care Team to call the Diabetes Education Team at [phone] or send an In Basket message to the Diabetes Education Pool (P DIAB ED-PATIENT CARE).    Diabetes Education Topics: Comprehensive Knowledge Assessment and Instruction    Special Educational Needs Requiring Individual DSMT: None       MEDICAL NUTRITION THERAPY (MNT) for Diabetes    Medical Nutrition Therapy with a Registered Dietitian can be provided in coordination with Diabetes Self-Management Training to assist in achieving optimal diabetes management.    MNT Type and Hours: New diagnosis: Initial MNT - 3 hours                       Medicare will  "cover: 3 hours initial MNT in 12 month period after first visit, plus 2 hours of follow-up MNT annually    Please be aware that coverage of these services is subject to the terms and limitations of your health insurance plan.  Call member services at your health plan to determine Diabetes Self-Management Training benefits and ask which blood glucose monitor brands are covered by your plan.      Please bring the following with you to your appointment:    (1)  List of current medications   (2)  List of Blood Glucose Monitor brands that are covered by your insurance plan  (3)  Blood Glucose Monitor and log book  (4)   Food records for the 3 days prior to your visit    The Certified Diabetes Educator may make diabetes medication adjustments per the CDE Protocol and Collaborative Practice Agreement.                  Who to contact     If you have questions or need follow up information about today's clinic visit or your schedule please contact [facility] directly at [phone].  Normal or non-critical lab and imaging results will be communicated to you by MyChart, letter or phone within 4 business days after the clinic has received the results. If you do not hear from us within 7 days, please contact the clinic through [employer] or phone. If you have a critical or abnormal lab result, we will notify you by phone as soon as possible.  Submit refill requests through [employer] or call your pharmacy and they will forward the refill request to us. Please allow 3 business days for your refill to be completed.          Additional Information About Your Visit        [employer] Information     [employer] lets you send messages to your doctor, view your test results, renew your prescriptions, schedule appointments and more. To sign up, go to [email or web address] . Click on \"Log in\" on the left side of the screen, which will take you to the Welcome page. Then click on \"Sign up Now\" on the right side of the page.     You " "will be asked to enter the access code listed below, as well as some personal information. Please follow the directions to create your username and password.     Your access code is: [email or web address]  Expires: 2017  5:33 PM     Your access code will  in 90 days. If you need help or a new code, please call your [facility] clinic or [phone].        Care EveryWhere ID     This is your Care EveryWhere ID. This could be used by other organizations to access your [facility] medical records  [id number]        Your Vitals Were     Pulse Temperature Height Pulse Oximetry BMI (Body Mass Index)       89 97.8  F (36.6  C) (Oral) 5' 0.75\" (1.543 m) 97% 34.06 kg/m2        Blood Pressure from Last 3 Encounters:   17 122/80   17 118/74   17 120/78    Weight from Last 3 Encounters:   17 178 lb 12.8 oz (81.1 kg)   17 176 lb 3.2 oz (79.9 kg)   17 176 lb 8 oz (80.1 kg)              We Performed the Following     DIABETES EDUCATOR REFERRAL          Today's Medication Changes          These changes are accurate as of: 3/21/17  3:53 PM.  If you have any questions, ask your nurse or doctor.               Start taking these medicines.        Dose/Directions    blood glucose lancets standard   Commonly known as:  no brand specified   Used for:  Type 2 diabetes mellitus with hyperglycemia, without long-term current use of insulin (H)   Started by:  Yocasta Moreno MD        Use to test blood sugar 4 times daily or as directed. Anticipate decrease in the future.   Quantity:  1 Box   Refills:  0       blood glucose monitoring test strip   Commonly known as:  no brand specified   Used for:  Type 2 diabetes mellitus with hyperglycemia, without long-term current use of insulin (H)   Started by:  Yocasta Moreno MD        Use to test blood sugars 4 times daily or as directed. Anticipate decreasing in the future.   Quantity:  100 strip   Refills:  prn         These medicines have changed or have updated " prescriptions.        Dose/Directions    aspirin 81 MG chewable tablet   This may have changed:    - how much to take  - how to take this  - when to take this   Changed by:  Yocasta Moreno MD        Dose:  81 mg   Take 1 tablet (81 mg) by mouth daily   Quantity:  36 tablet   Refills:  0       clindamycin 150 MG capsule   Commonly known as:  CLEOCIN   This may have changed:    - how much to take  - how to take this  - when to take this   Changed by:  Yocasta Moreno MD        Dose:  300 mg   Take 2 capsules (300 mg) by mouth 3 times daily   Refills:  0       HYDROcodone-acetaminophen 5-325 MG per tablet   Commonly known as:  NORCO   This may have changed:    - how much to take  - how to take this  - when to take this  - reasons to take this   Changed by:  Yocasta Moreno MD        Dose:  1-2 tablet   Take 1-2 tablets by mouth every 4 hours as needed for moderate to severe pain   Refills:  0       metFORMIN 1000 MG tablet   Commonly known as:  GLUCOPHAGE   This may have changed:    - how much to take  - how to take this  - when to take this   Changed by:  Yocasta Moreno MD        Dose:  1000 mg   Take 1 tablet (1,000 mg) by mouth 2 times daily (with meals)   Quantity:  60 tablet   Refills:  0       methocarbamol 750 MG tablet   Commonly known as:  ROBAXIN   This may have changed:  Another medication with the same name was removed. Continue taking this medication, and follow the directions you see here.   Changed by:  Yocasta Moreno MD        Take by mouth 4 times daily as needed for muscle spasms   Quantity:  180 tablet   Refills:  0         Stop taking these medicines if you haven't already. Please contact your care team if you have questions.     oxyCODONE 5 MG IR tablet   Commonly known as:  ROXICODONE   Stopped by:  Yocasta Moreno MD                Where to get your medicines      These medications were sent to [place] [address] [place] [address]  [address] [place] [address] [address] [facility] [address]     Phone:   [phone]     blood glucose lancets standard    blood glucose monitoring test strip                Primary Care Provider Office Phone # Fax #    Yocasta Moreno [phone]       [facility] [address] [address]  [facility] [address]        Thank you!     Thank you for choosing [facility]  for your care. Our goal is always to provide you with excellent care. Hearing back from our patients is one way we can continue to improve our services. Please take a few minutes to complete the written survey that you may receive in the mail after your visit with us. Thank you!             Your Updated Medication List - Protect others around you: Learn how to safely use, store and throw away your medicines at www.disposemymeds.org.          This list is accurate as of: 3/21/17  3:53 PM.  Always use your most recent med list.                   Brand Name Dispense Instructions for use    aspirin 81 MG chewable tablet     36 tablet    Take 1 tablet (81 mg) by mouth daily       blood glucose lancets standard    no brand specified    1 Box    Use to test blood sugar 4 times daily or as directed. Anticipate decrease in the future.       blood glucose monitoring lancets          blood glucose monitoring meter device kit          blood glucose monitoring test strip    no brand specified    100 strip    Use to test blood sugars 4 times daily or as directed. Anticipate decreasing in the future.       clindamycin 150 MG capsule    CLEOCIN     Take 2 capsules (300 mg) by mouth 3 times daily       HYDROcodone-acetaminophen 5-325 MG per tablet    NORCO     Take 1-2 tablets by mouth every 4 hours as needed for moderate to severe pain       metFORMIN 1000 MG tablet    GLUCOPHAGE    60 tablet    Take 1 tablet (1,000 mg) by mouth 2 times daily (with meals)       methocarbamol 750 MG tablet    ROBAXIN    180 tablet    Take by mouth 4 times daily as needed for muscle spasms       ondansetron 4 MG ODT tab     BRONWYN

## 2017-03-21 NOTE — NURSING NOTE
"Chief Complaint   Patient presents with     Hospital F/U     Forms     disability forms       Initial /80 (BP Location: Left arm, Patient Position: Chair, Cuff Size: Adult Large)  Pulse 89  Temp 97.8  F (36.6  C) (Oral)  Ht 5' 0.75\" (1.543 m)  Wt 178 lb 12.8 oz (81.1 kg)  SpO2 97%  BMI 34.06 kg/m2 Estimated body mass index is 34.06 kg/(m^2) as calculated from the following:    Height as of this encounter: 5' 0.75\" (1.543 m).    Weight as of this encounter: 178 lb 12.8 oz (81.1 kg).  Medication Reconciliation: complete   Lashawn Rob, VERA      "

## 2017-03-21 NOTE — PROGRESS NOTES
SUBJECTIVE:                                                    Teresa Hahn is a 56 year old female who presents to clinic today for the following health issues:          Hospital Follow-up Visit:    Hospital/Nursing Home/IP Rehab Facility: [facility]  Date of Admission: 03/16/2017  Date of Discharge: 03/19/2017  Reason(s) for Admission: Mastectomy and Reconstruction     Is having a persistent headache and feeling thirsty.       Problems taking medications regularly:  None       Medication changes since discharge: see medication list        Problems adhering to non-medication therapy:  None    Summary of hospitalization:  Discharge summary unavailable  Did review some of their discharge paperwork. (could not find on Care Everywhere)  Diagnostic Tests/Treatments reviewed.  Follow up needed: she would like me to check drains.   Follow up sugars.   Other Healthcare Providers Involved in Patient s Care:         Surgical follow-up appointment - near future  Update since discharge: improved.     Post Discharge Medication Reconciliation: discharge medications reconciled, continue medications without change.  Plan of care communicated with patient and family     Coding guidelines for this visit:  Type of Medical   Decision Making Face-to-Face Visit       within 7 Days of discharge Face-to-Face Visit        within 14 days of discharge   Moderate Complexity [id number]   High Complexity [id number]            See under ROS    Patient Active Problem List   Diagnosis     Acute reaction to stress     Arthropathy     Obesity     GERD (gastroesophageal reflux disease)     CARDIOVASCULAR SCREENING; LDL GOAL LESS THAN 160     Anxiety     Health Care Home     TBI (traumatic brain injury) (H)     ADD (attention deficit disorder)     Major depressive disorder, single episode in full remission (H)     Malignant neoplasm of right female breast, unspecified site of breast (H)     Hospital discharge follow-up       Current  Outpatient Prescriptions   Medication Sig Dispense Refill     blood glucose monitoring (ACCU-CHEK SHERYL SMARTVIEW) meter device kit   0     blood glucose monitoring (ACCU-CHEK FASTCLIX) lancets   0     ondansetron (ZOFRAN-ODT) 4 MG ODT tab   0     clindamycin (CLEOCIN) 150 MG capsule Take 2 capsules (300 mg) by mouth 3 times daily  0     aspirin 81 MG chewable tablet Take 1 tablet (81 mg) by mouth daily 36 tablet 0     HYDROcodone-acetaminophen (NORCO) 5-325 MG per tablet Take 1-2 tablets by mouth every 4 hours as needed for moderate to severe pain  0     metFORMIN (GLUCOPHAGE) 1000 MG tablet Take 1 tablet (1,000 mg) by mouth 2 times daily (with meals) 60 tablet 0     methocarbamol (ROBAXIN) 750 MG tablet Take by mouth 4 times daily as needed for muscle spasms 180 tablet      blood glucose (NO BRAND SPECIFIED) lancets standard Use to test blood sugar 4 times daily or as directed. Anticipate decrease in the future. 1 Box 0     blood glucose monitoring (NO BRAND SPECIFIED) test strip Use to test blood sugars 4 times daily or as directed. Anticipate decreasing in the future. 100 strip prn         Problem list and histories reviewed & adjusted, as indicated.  Additional history: see under ROS        Reviewed and updated as needed this visit by clinical staff  Tobacco  Allergies  Meds  Med Hx  Surg Hx  Fam Hx  Soc Hx      Reviewed and updated as needed this visit by Provider       Family History   Problem Relation Age of Onset     C.A.D. Mother      numerous stents; over age 65.     HEART DISEASE Maternal Aunt      valve problem     Respiratory Maternal Aunt      COPD     Breast Cancer Other      paternal cousin     Other Cancer Brother 46     Lymphoma     She notes DM in the family.    Social History   Substance Use Topics     Smoking status: Never Smoker     Smokeless tobacco: Never Used     Alcohol use Yes      Comment: occasional       ROS:  Her daughter is here with her today.     CONSTITUTIONAL:NEGATIVE for  "fever, chills, change in weight  RESP:NEGATIVE for significant cough or SOB  CV: NEGATIVE for chest pain, palpitations or peripheral edema  GI: has had some nausea and constipation related to pain medication. Working on keeping stools soft.  PSYCHIATRIC: NEGATIVE for changes in mood or affect    Was not tolerating oxycodone.  So now on Norco.     Was dx with DM type 2 while in the hospital.   On metformin bid.   Checking sugar qid at this time.  Lowest is 149. Highest 186. Since home.     Was on insulin in the hospital.     HgbA1C reported as 9.2.  Was given a packet for DM, but the diabetic educator was not there.     Surgery did take 2 hours longer than anticipated.   There were some complications.  Some numbness left anterior thigh since her surgery.   Daughter is working on her drains and changing regularly. Notes there is still a fair amount drainage from the chest drain.      OBJECTIVE:                                                    /80 (BP Location: Left arm, Patient Position: Chair, Cuff Size: Adult Large)  Pulse 89  Temp 97.8  F (36.6  C) (Oral)  Ht 5' 0.75\" (1.543 m)  Wt 178 lb 12.8 oz (81.1 kg)  SpO2 97%  BMI 34.06 kg/m2  Body mass index is 34.06 kg/(m^2).  GENERAL APPEARANCE: alert and no distress  RESP: lungs clear to auscultation - no rales, rhonchi or wheezes  CV: regular rates and rhythm  ABDOMEN: soft, nontender  SKIN: she has a long horizontal incision across her lower abdomen. Steri strips in place. It does look intact, and clean with minimal erythema.  She has a drain coming from the lower right abdomen. There is some mild erythema surrounding the stitch holding it in place. A small amount serosanguinous fluid in the drain.  Right chest has another drain with a little more drainage in it. There is less erythema surrounding this area.   PSYCH: mentation appears normal and affect normal/bright      Reviewed the discharge paperwork that they had.   Did have her sign an ABNER for getting " additional paperwork.     Reviewed her chart related to sugars.    Hemoglobin HgbA1C 9.2 as seen on their paperwork.    No results for input(s): CHOL, HDL, LDL, TRIG, CHOLHDLRATIO in the last 40135 hours.       ASSESSMENT/PLAN:                                                      Hospital discharge follow-up      Malignant neoplasm of right female breast, unspecified site of breast (H)  She has recently had mastectomy with reconstruction. Incisions look good. She has adequate help with managing the drains. She will follow up with the surgeon end next week.  She anticipates probably seeing Dr. Ochoa in the future.     Type 2 diabetes mellitus with hyperglycemia, without long-term current use of insulin (H)  New diagnosis.  She and daughter with many appropriate questions.   At this time, I do think OK to be checking qid; new diagnosis and recovering from surgery. Discussed will probably decrease checks in the future.   Discussed metformin. Her sugar should not go too low with this. Despite getting to full dose quickly, she appears to be tolerating related to GI effects.   Discussed diabetes education is very important. Anticipate she will go as she starts to feel better.   Anticipate we will be paying attention to her lipids etc as we move forward. Did touch on some of the goals of treatment.  She mentioned she would plan on seeing an eye doctor soon; recommend not getting new glasses until her sugars are under better control.   - DIABETES EDUCATOR REFERRAL  - blood glucose (NO BRAND SPECIFIED) lancets standard; Use to test blood sugar 4 times daily or as directed. Anticipate decrease in the future.  - blood glucose monitoring (NO BRAND SPECIFIED) test strip; Use to test blood sugars 4 times daily or as directed. Anticipate decreasing in the future.      Follow up in 3-4 weeks. Earlier prtigre Moreno MD, MD  [facility]

## 2017-03-23 ENCOUNTER — DOCUMENTATION ONLY (OUTPATIENT)
Dept: FAMILY MEDICINE | Facility: CLINIC | Age: 57
End: 2017-03-23

## 2017-03-23 NOTE — PROGRESS NOTES
Recd records from [facility] 03/23/2017 and forwarded to Yocasta Moreno for review and scanning

## 2017-03-28 PROBLEM — E11.65 TYPE 2 DIABETES MELLITUS WITH HYPERGLYCEMIA, WITHOUT LONG-TERM CURRENT USE OF INSULIN (H): Status: ACTIVE | Noted: 2017-03-21

## 2017-03-28 PROBLEM — E11.65 TYPE 2 DIABETES MELLITUS WITH HYPERGLYCEMIA, WITHOUT LONG-TERM CURRENT USE OF INSULIN (H): Status: ACTIVE | Noted: 2017-03-28

## 2017-03-29 ENCOUNTER — TELEPHONE (OUTPATIENT)
Dept: INTERNAL MEDICINE | Facility: CLINIC | Age: 57
End: 2017-03-29

## 2017-03-29 ENCOUNTER — ALLIED HEALTH/NURSE VISIT (OUTPATIENT)
Dept: EDUCATION SERVICES | Facility: CLINIC | Age: 57
End: 2017-03-29
Payer: COMMERCIAL

## 2017-03-29 DIAGNOSIS — E11.65 TYPE 2 DIABETES MELLITUS WITH HYPERGLYCEMIA, WITHOUT LONG-TERM CURRENT USE OF INSULIN (H): Primary | ICD-10-CM

## 2017-03-29 PROCEDURE — G0108 DIAB MANAGE TRN  PER INDIV: HCPCS

## 2017-03-29 NOTE — MR AVS SNAPSHOT
After Visit Summary   3/29/2017    Teresa Hahn    MRN: [id number]           Patient Information     Date Of Birth          1960        Visit Information        Provider Department      3/29/2017 1:30 PM [place] DIABETIC ED RESOURCE [facility]        Today's Diagnoses     Type 2 diabetes mellitus with hyperglycemia, without long-term current use of insulin (H)    -  1      Care Instructions    MY DIABETES TODAY:    1)  Goal A1C is under <7.0  Mine is:    No results found for: A1C    2)  Goal LDL (bad cholesterol) under 100  (measured at least yearly)- I am currently at: No results found for: LDL    3)  Goal blood pressure under 140/90- mine was Data Unavailable today    Care Plan:  Meal Plan Recommendation: eat 3 meals a day, have small snacks between meals, if needed, use portion control and Aim for 2-3 carb servings at meals and 1-2 carb servings at snacks  Check blood sugars twice a day - vary the times    Follow up:  Follow-up diabetes education appointment scheduled on 5/3 12:30.   Call ([phone]), e-mail ([email or web address]), or send [employer] message with questions, concerns or if follow-up is needed.     Bring blood glucose meter and logbook with you to all doctor and follow-up appointments.     [place] [facility] for the [facility]:  For Your Diabetes or Nutrition Education Appointments Call:  [phone]   For Diabetes or Nutrition Related Questions Call or Email:   [phone]  [email or web address]  Fax: [phone]   If you need a medication refill please contact your pharmacy. Please allow 3 business days for your refills to be completed.     Instructions for emailing the Diabetes Educators    If you need to communicate a non-urgent message to a Diabetes Educator via email, please send to [email or web address].    Please follow the following email guidelines:    Subject line: Secure: your clinic name (example: Secure:  "[facility]  In the email please include: First name, middle initial, last name and date of birth.    We will be in touch with you within one (1) business day.        Follow-ups after your visit        Your next 10 appointments already scheduled     May 03, 2017 12:30 PM CDT   Diabetic Education with [place] DIABETIC ED RESOURCE   [facility] ([facility])    [address]  [facility] [address] [phone]              Who to contact     If you have questions or need follow up information about today's clinic visit or your schedule please contact [facility] directly at [phone].  Normal or non-critical lab and imaging results will be communicated to you by MyChart, letter or phone within 4 business days after the clinic has received the results. If you do not hear from us within 7 days, please contact the clinic through [employer] or phone. If you have a critical or abnormal lab result, we will notify you by phone as soon as possible.  Submit refill requests through [employer] or call your pharmacy and they will forward the refill request to us. Please allow 3 business days for your refill to be completed.          Additional Information About Your Visit        [employer] Information     [employer] lets you send messages to your doctor, view your test results, renew your prescriptions, schedule appointments and more. To sign up, go to [email or web address] . Click on \"Log in\" on the left side of the screen, which will take you to the Welcome page. Then click on \"Sign up Now\" on the right side of the page.     You will be asked to enter the access code listed below, as well as some personal information. Please follow the directions to create your username and password.     Your access code is: [email or web address]  Expires: 2017  5:33 PM     Your access code will  in 90 days. If you need help or a new code, please call your [facility] or [phone].   "      Care EveryWhere ID     This is your Care EveryWhere ID. This could be used by other organizations to access your [place] medical records  [id number]         Blood Pressure from Last 3 Encounters:   03/21/17 122/80   03/07/17 118/74   02/16/17 120/78    Weight from Last 3 Encounters:   03/21/17 81.1 kg (178 lb 12.8 oz)   03/07/17 79.9 kg (176 lb 3.2 oz)   02/16/17 80.1 kg (176 lb 8 oz)              Today, you had the following     No orders found for display         Today's Medication Changes          These changes are accurate as of: 3/29/17  2:36 PM.  If you have any questions, ask your nurse or doctor.               These medicines have changed or have updated prescriptions.        Dose/Directions    * blood glucose monitoring lancets   This may have changed:  Another medication with the same name was added. Make sure you understand how and when to take each.        Refills:  0       * blood glucose monitoring lancets   This may have changed:  You were already taking a medication with the same name, and this prescription was added. Make sure you understand how and when to take each.   Used for:  Type 2 diabetes mellitus with hyperglycemia, without long-term current use of insulin (H)        Use to test blood sugar 4 times daily or as directed.  Ok to substitute alternative if insurance prefers.   Quantity:  1 Box   Refills:  prn       * blood glucose monitoring test strip   Commonly known as:  no brand specified   This may have changed:  Another medication with the same name was added. Make sure you understand how and when to take each.   Used for:  Type 2 diabetes mellitus with hyperglycemia, without long-term current use of insulin (H)        Use to test blood sugars 4 times daily or as directed. Anticipate decreasing in the future.   Quantity:  100 strip   Refills:  prn       * blood glucose monitoring test strip   Commonly known as:  JOLLY CONTOUR NEXT   This may have changed:  You were already taking  a medication with the same name, and this prescription was added. Make sure you understand how and when to take each.   Used for:  Type 2 diabetes mellitus with hyperglycemia, without long-term current use of insulin (H)        Use to test blood sugar 4 times daily or as directed.  Ok to substitute alternative if insurance prefers.   Quantity:  200 strip   Refills:  1       * Notice:  This list has 4 medication(s) that are the same as other medications prescribed for you. Read the directions carefully, and ask your doctor or other care provider to review them with you.         Where to get your medicines      These medications were sent to [facility] [address] [facility] [address]  [address] [facility] [address]     Phone:  [phone]     blood glucose monitoring lancets    blood glucose monitoring test strip                Primary Care Provider Office Phone # Fax #    Yocasta Moreno [phone]       [facility] [address] [facility] [address]        Thank you!     Thank you for choosing [facility]  for your care. Our goal is always to provide you with excellent care. Hearing back from our patients is one way we can continue to improve our services. Please take a few minutes to complete the written survey that you may receive in the mail after your visit with us. Thank you!             Your Updated Medication List - Protect others around you: Learn how to safely use, store and throw away your medicines at www.disposemymeds.org.          This list is accurate as of: 3/29/17  2:36 PM.  Always use your most recent med list.                   Brand Name Dispense Instructions for use    aspirin 81 MG chewable tablet     36 tablet    Take 1 tablet (81 mg) by mouth daily       blood glucose lancets standard    no brand specified    1 Box    Use to test blood sugar 4 times daily or as directed. Anticipate decrease in the future.       * blood  glucose monitoring lancets          * blood glucose monitoring lancets     1 Box    Use to test blood sugar 4 times daily or as directed.  Ok to substitute alternative if insurance prefers.       blood glucose monitoring meter device kit          * blood glucose monitoring test strip    no brand specified    100 strip    Use to test blood sugars 4 times daily or as directed. Anticipate decreasing in the future.       * blood glucose monitoring test strip    JOLLY CONTOUR NEXT    200 strip    Use to test blood sugar 4 times daily or as directed.  Ok to substitute alternative if insurance prefers.       clindamycin 150 MG capsule    CLEOCIN     Take 2 capsules (300 mg) by mouth 3 times daily       HYDROcodone-acetaminophen 5-325 MG per tablet    NORCO     Take 1-2 tablets by mouth every 4 hours as needed for moderate to severe pain       metFORMIN 1000 MG tablet    GLUCOPHAGE    60 tablet    Take 1 tablet (1,000 mg) by mouth 2 times daily (with meals)       methocarbamol 750 MG tablet    ROBAXIN    180 tablet    Take by mouth 4 times daily as needed for muscle spasms       ondansetron 4 MG ODT tab    ZOFRAN-ODT         * Notice:  This list has 4 medication(s) that are the same as other medications prescribed for you. Read the directions carefully, and ask your doctor or other care provider to review them with you.

## 2017-03-29 NOTE — Clinical Note
FYI - Patient was seen for initial diabetes education.  Reviewed BGs are most are within target, or slightly above.  Recommended patient continue to test 2x/day.  Advised on carb counting diet.  Will follow-up in 1 month. Mita Patrick RD LD CDE

## 2017-03-29 NOTE — PROGRESS NOTES
Diabetes Self Management Training: Initial Assessment Visit for Newly Diagnosed Patients (Complete AADE Goals Flowsheet)    Teresa Hahn presents today for education related to Type 2 diabetes.    She is accompanied by daughter    Patient's diabetes management related comments/concerns: none    Patient's emotional response to diabetes: expresses readiness to learn and concern for health and well-being    Patient would like this visit to be focused around the following diabetes-related behaviors and goals: Diabetes pathophysiology, Assistance with making lifestyle changes, Healthy Eating, Monitoring and Taking Medication    ASSESSMENT:  Patient Problem List and Family Medical History reviewed for relevant medical history, current medical status, and diabetes risk factors.    Current Diabetes Management per Patient:  Taking diabetes medications?   yes:     Diabetes Medication(s)     Biguanides Sig    metFORMIN (GLUCOPHAGE) 1000 MG tablet Take 1 tablet (1,000 mg) by mouth 2 times daily (with meals)          *Abbreviated insulin dose documentation key: Insulin Trade Name (cgfjenlgf-idcza-hnakap-bedtime) - i.e. Humalog 5-5-5-0 (Humalog 5 units at breakfast, 5 units at lunch, and 5 units at dinner).    Past Diabetes Education: Newly diagnosed    Patient glucose self monitoring as follows: two times daily, before and/or 2 hours after the start of a meal.   BG meter: Contour Next  meter  BG results:          BG values are: most in goal, or slightly above goal  Patient's most recent No results found for: A1C  meeting goal of <7.0    Nutrition:  Patient eats 3 meals per day.  Trying to alter diet and avoid sweets.    Breakfast - 1.25 cups unsweetened cereal, milk, OR Latvian toast w/ syrup, butter, walnuts, greek yogurt  Lunch - 1 can soup, 10-12 oz milk OR bagel sandwich, kettle chips, ice cream OR meatloat, string cheese, strawberries   Dinner - meat, potato, veg   Snacks - occasional cookie    Beverages: water, skim/1%  "milk    Cultural/Uatsdin diet restrictions: No     Biggest Challenge to Healthy Eating: portion control and knowing what to eat    Physical Activity:    Limitations: breast surgery    Diabetes Risk Factors:  family history, age over 45 years, overweight/obesity and inactivity    Diabetes Complications:  Not discussed today.    Vitals:  There were no vitals taken for this visit.  Estimated body mass index is 34.06 kg/(m^2) as calculated from the following:    Height as of 3/21/17: 1.543 m (5' 0.75\").    Weight as of 3/21/17: 81.1 kg (178 lb 12.8 oz).   Last 3 BP:   BP Readings from Last 3 Encounters:   03/21/17 122/80   03/07/17 118/74   02/16/17 120/78       History   Smoking Status     Never Smoker   Smokeless Tobacco     Never Used       Labs:  No results found for: A1C  Lab Results   Component Value Date     11/05/2010     No results found for: LDL  No results found for: HDL]  GFR Estimate   Date Value Ref Range Status   11/05/2010 75 >60 mL/min/1.7m2 Final     GFR Estimate If Black   Date Value Ref Range Status   11/05/2010 >90 >60 mL/min/1.7m2 Final     Lab Results   Component Value Date    CR 0.81 11/05/2010     No results found for: MICROALBUMIN    Socio/Economic Considerations:    Support system: family    Health Beliefs and Attitudes:   Patient Activation Measure Survey Score:  ANGELIC Score (Last Two) 5/18/2010   ANGELIC Raw Score 44   Activation Score 70.8   ANGELIC Level 4       Stage of Change: PREPARATION (Decided to change - considering how)      Diabetes knowledge and skills assessment:     Patient is knowledgeable in diabetes management concepts related to: Monitoring and Taking Medication    Patient needs further education on the following diabetes management concepts: Healthy Eating, Being Active, Monitoring, Problem Solving, Reducing Risks and Healthy Coping    Barriers to Learning Assessment: No Barriers identified    Based on learning assessment above, most appropriate setting for further diabetes " education would be: Individual setting.    INTERVENTION:   Education provided today on:  AADE Self-Care Behaviors:  Healthy Eating: carbohydrate counting, consistency in amount, composition, and timing of food intake, weight reduction, portion control and label reading  Monitoring: log and interpret results, individual blood glucose targets and frequency of monitoring  Taking Medication: action of prescribed medication, side effects of prescribed medications and when to take medications  Healthy Coping: benefits of making appropriate lifestyle changes  Patient was instructed on Contour Next  meter and was able to provide an accurate return demonstration.     Opportunities for ongoing education and support in diabetes-self management were discussed.    Pt verbalized understanding of concepts discussed and recommendations provided today.       Education Materials Provided:  [place] Understanding Diabetes Booklet, Safe Disposal Options for Needles & Syringes and BG Log Sheet    PLAN:  See Patient Instructions for co-developed, patient-stated behavior change goals.  AVS printed and provided to patient today.    FOLLOW-UP:  Follow-up appointment scheduled on 5/3.  Chart routed to referring provider.    Ongoing plan for education and support: Written resources (magazines, books, etc.) and Follow-up visit with diabetes educator in 1 month    JOHN Velasquez CDE    Time Spent: 60 minutes  Encounter Type: Individual    Any diabetes medication dose changes were made via the CDE Protocol and Collaborative Practice Agreement with the patient's referring provider. A copy of this encounter was shared with the provider.

## 2017-03-29 NOTE — PATIENT INSTRUCTIONS
MY DIABETES TODAY:    1)  Goal A1C is under <7.0  Mine is:    No results found for: A1C    2)  Goal LDL (bad cholesterol) under 100  (measured at least yearly)- I am currently at: No results found for: LDL    3)  Goal blood pressure under 140/90- mine was Data Unavailable today    Care Plan:  Meal Plan Recommendation: eat 3 meals a day, have small snacks between meals, if needed, use portion control and Aim for 2-3 carb servings at meals and 1-2 carb servings at snacks  Check blood sugars twice a day - vary the times    Follow up:  Follow-up diabetes education appointment scheduled on 5/3 12:30.   Call ([phone]), e-mail ([email or web address]), or send BeneStream message with questions, concerns or if follow-up is needed.     Bring blood glucose meter and logbook with you to all doctor and follow-up appointments.     [place] for the [facility] Area:  For Your Diabetes or Nutrition Education Appointments Call:  [phone]   For Diabetes or Nutrition Related Questions Call or Email:   [phone]  [email or web address]  Fax: [phone]   If you need a medication refill please contact your pharmacy. Please allow 3 business days for your refills to be completed.     Instructions for emailing the Diabetes Educators    If you need to communicate a non-urgent message to a Diabetes Educator via email, please send to [email or web address].    Please follow the following email guidelines:    Subject line: Secure: your clinic name (example: Secure: [place])  In the email please include: First name, middle initial, last name and date of birth.    We will be in touch with you within one (1) business day.

## 2017-03-29 NOTE — TELEPHONE ENCOUNTER
Pt and her daughter here at our clinic today for Diabetic education.  Daughter asked if there was a nurse that could look over her site.    Pt had surgery for reconstructive breast surgery, she has a drain at right hip area. It is reddened about a dave size. It's warm to touch, it was soft.  Pt has an appt to see her NP at her plastic surgeon's office tomorrow.    Recommend to use some antibx ointment at the site until seeing the NP.    Elvis HEBERT RN

## 2017-04-11 ENCOUNTER — TRANSFERRED RECORDS (OUTPATIENT)
Dept: HEALTH INFORMATION MANAGEMENT | Facility: CLINIC | Age: 57
End: 2017-04-11

## 2017-04-12 ENCOUNTER — CARE COORDINATION (OUTPATIENT)
Dept: CARE COORDINATION | Facility: CLINIC | Age: 57
End: 2017-04-12

## 2017-04-12 NOTE — PROGRESS NOTES
"Clinic Care Coordination Contact  OUTREACH    Referral Information:  Referral Source: PCP  Reason for Contact:   Follow up outreach to patient.   Care Conference: No     Universal Utilization:   ED Visits in last year: 0  Hospital visits in last year: 1  Last PCP appointment: 03/21/17  Missed Appointments:  (N/A)  Concerns: Several concerns identified today - see notes.   Multiple Providers or Specialists: Yes    Clinical Concerns:  Current Medical Concerns:       1.  Patient underwent the following procedures for RIGHT BRCA at [facility] on 03/16/2017:  Right mastectomy, Right SLN biopsy and Right breast reconstruction with muscle sparing free TRAM (transverse rectus abdominis musculocutaneous) flap.   She reports that she she is able to move around her home and attend provider follow up appointments as directed.   She saw Reconstructive Surgeon and Oncologist this week.   During the visit to the Reconstructive Surgeon this week, she was informed that she would need to keep her abdominal drain in place awhile longer- date TBD for removal.  She states that her Oncology appointment \"went as expected\".  She reports that her onco-type is considered intermediate [Treatments based on onco-type:  Low - Aromasin,  Intermediate - Short Course / 4 weeks of chemotherapy, High- Long Course/ Up to 5 months of chemotherapy.]    She was giving information regarding each treatment option and needs to review them before her next Onco appointment.  Patient is currently weighing her options for these treatments, as her biopsy results indicated that she has LCIS (lobular carcinoma in-situ).  She has concerns for the Oncologist as to what this means for her LEFT breast as she only elected for a unilateral mastectomy to the RIGHT breast.    She is double guessing herself on her decision to just having unilateral mastectomy performed. She will need to follow up with each specialist q week until further notice.  Future appointment " "scheduled with Dr. Antonio ([facility]; [place]     2.  Patient newly diagnosed with DM-Type 2.  She reports that this is overwhelming to her in the fact that she has never been one to be on medications as she \"couldn't get into a daily routine\" of taking medication, she states that she is \"ADD\", a \"minimalist with my body and interventions\" and \"you have to pay good money to eat good food.\"   She states that she has never been a \"cook\" by nature and has to learn what foods are healthy; she has to look at food differently now than how she ever has.    She states that this is an overall \"lifestyle adjustment\" with monitoring BG routinely, carb counting and taking Metformin on routine basis.    She has noticed increased thirst more than normal, since being diagnosed with DM-Type 2, however it \"never crossed my mind\" before - unsure if due to the fact of new dx, sleeping propped up on pillow and sleeping with mouth open or a combination of the two.   She will continue to monitor this in addition to BG and carb counting.  She is working with Diabetic Ed - last visit 03/29/2017.     3.  Patient is looking for alternative therapies to help manage pain beyond oral medications.   CCRN discussed options such as guided imagery, meditation, deep breathing exercises, aromatherapy, etc.  Will research this information and mail to home address for patient to review.     Current Behavioral Concerns:     Patient states that she is looking for someone for \"medical counseling\".   CCRN was having trouble deciphering what patient meant by this, based on patient's description.   Reviewed role of GABRIELA within her Care Team and offered her support through my role, in addition to potential Palliative Care Referral.   Patient felt that she landed somewhere in between these two spectrums as she wanted more than what CCRN able to offer but \"not as deep\" as Palliative Care.   When CCRN asked patient if she felt she would like to see a  " "provider for coping and recommendations from them around her health, she declined stating that she didn't need any assistance with coping mechanisms around her dx, as \"the coping has to come from me and no one else.\"   CCRN requested CCSW assistance to help determine if any other options available to patient for this request. (See 02/17/2017 CCRN notes for resources already provided to patient.)     Clinical Pathway Name: None      Medication Management:  CCRN reviewed medications with patient.   She is currently on Aromasin.  She is utilizing Zofran for nausea, especially prior to car rides as she gets motion sickness.   She has not been using any prescription pain medications to manage her pain, only IBU prn.   CCRN reviewed max dosing on IBU and recommended scheduled IBU (either 600mg q 6 hours OR 800mg q 8 hours to manage pain and stay ahead of it.)       Functional Status:  Mobility Status: Independent  Equipment Currently Used at Home: none (CURRENTLY HAS DRAIN IN PLACE)  Transportation:  Patient states that she is not yet able to drive as she is concerned about her drain getting caught up in the seatbelt or twisted around her and ripping it out.  She does not like to ask for rides, but understands this is part of her process through healing properly without further complications.         Psychosocial:  Current living arrangement:: I live in a private home with family  Financial/Insurance: [employer] [place]  Patient states that she appreciates CCRN involvement in her care as she \"would not call out\" even if there was a need.    She continues to feel well supported.      Resources and Interventions:  Current Resources:  Oncology, Diabetic Ed  PAS Number:  (N/A)  Senior Linkage Line Referral Placed:  (N/A)  Advanced Care Plans/Directives on file:: No  Referrals Placed: Palliative Care (Consider Palliative Care, once CCSW outreaches to patient))     Frequency of Care Coordination: Q 2-4 weeks  Upcoming appointment: " 04/19/17 (Appointment with Dr. Antonio - Onc)        Plan:  CCRN will mail resources for alternative methods to pain control.   Will request CCSW outreach to patient for further assessment of any MH needs.  CCRN outreach to patient in 2 weeks for follow up and will remain available to patient in that timeframe should she need anything.       Patient/Caregiver understanding: Patient agreed with plan.     Jeniffer Veliz, RN  [facility]  Clinic Care Coordinator - [place] and [place] Locations   Direct:  [phone] (voicemail available)

## 2017-04-13 ENCOUNTER — CARE COORDINATION (OUTPATIENT)
Dept: CARE COORDINATION | Facility: CLINIC | Age: 57
End: 2017-04-13

## 2017-04-13 NOTE — PROGRESS NOTES
ADDENDUM:  CCRN mailed out the [facility] Patient Education Brochure :  Beyond Pain Medicines - Tips to Help you Manage Your Pain.     Jeniffer Veliz, RN  [facility]  Clinic Care Coordinator - [place] and [place] Locations   Direct:  [phone] (voicemail available)

## 2017-04-13 NOTE — PROGRESS NOTES
Clinic Care Coordination Contact  OUTREACH    Referral Information:  Referral Source: PCP  Reason for Contact: RN clinic care coordinator asked that SW follow-up.        Universal Utilization:   ED Visits in last year: 0  Hospital visits in last year: 1  Last PCP appointment: 03/21/17  Missed Appointments:  (N/A)  Concerns: Several concerns identified today - see notes.   Multiple Providers or Specialists: Yes    Clinical Concerns:  Current Medical Concerns: Pt stated that she has follow-up planned for medical providers.    Current Behavioral Concerns: Pt stated that she feels overwhelmed by many things.     Education Provided to patient: N/A   Clinical Pathway Name: None  Clinical Pathway: None    Medication Management:  Not assessed. Pt is talking to her provider about chemotherapy.     Functional Status:  Mobility Status: Independent  Equipment Currently Used at Home: none (CURRENTLY HAS DRAIN IN PLACE)  Transportation: Pt stated that she is aware that she cannot drive when she is getting treatment.        Psychosocial:  Current living arrangement: I live in a private home with family  Financial/Insurance: Pt has [employer] insurance.   Pt has support from family and friends.      Resources and Interventions:  Current Resources:  (Oncology );  (N/A)  PAS Number:  (N/A)  Senior Linkage Line Referral Placed:  (N/A)  Advanced Care Plans/Directives on file:: No  Referrals Placed: Palliative Care ((Consider Palliative Care, once CCSW outreaches to patient))    Barriers: Resource Needs  Strengths: Supportive family and friends.     Patient/Caregiver understanding: Spoke to pt who stated that she is saw a provider at the [facility]. Pt stated that she will see other provider from [facility] at the [place] location. Pt stated that she is looking to get resources for nutrition, coping/integrated, mindfulness, and psychology (specific to cancer patients or medical based). Pt stated that she  "is aware of many resources though wants to \"find the right ones\". SW spoke to Charlotte Treyollie from [facility] who stated that she is available to assist pt with resources and coordination. SW updated pt that Charlotte was contacted and would be reaching out to pt.    Frequency of Care Coordination: Q 2-4 weeks  Upcoming appointment: 04/19/17 (Appointment with Dr. Antonio - Onc)     Plan: SW will plan to follow-up next week.    WES Larson, [facility]  Social Work - Care Coordinator  [facility], [place], and [place]  Phone: [phone]      "

## 2017-04-14 DIAGNOSIS — E11.65 TYPE 2 DIABETES MELLITUS WITH HYPERGLYCEMIA, WITHOUT LONG-TERM CURRENT USE OF INSULIN (H): Primary | ICD-10-CM

## 2017-04-14 NOTE — TELEPHONE ENCOUNTER
Patient requesting prescription for metformin. Medication listed as historical. Seen 3/21 by MH for hospital follow up. Advised on need for fasting labs. Will make appt to see provider.    Please associate dx and send prescription to pharmacy if approved.    Yohana Carlos RN

## 2017-04-18 ENCOUNTER — CARE COORDINATION (OUTPATIENT)
Dept: CARE COORDINATION | Facility: CLINIC | Age: 57
End: 2017-04-18

## 2017-04-18 NOTE — PROGRESS NOTES
Clinic Care Coordination Contact      Referral Source: PCP  Clinical Data: Care Coordinator Outreach  Spoke to pt who stated that she went to the [facility] yesterday to complete paperwork for tomorrow's visit (04/19/17). Pt stated that she spoke to Charlotte from [facility] who pt indicated was helpful with resources. Pt stated that she will ask tomorrow about utilizing the [facility] resources even if she gets treatment at [place]. Pt stated that she would update RN and SW clinic care coordinator after her appointment tomorrow. Pt stated that she wants to find a location where she feels comfortable with the chemotherapy and providers.    Plan: Care Coordinator will try to reach patient again in two weeks.    WES Larson, SW  Social Work - Care Coordinator  [facility]]- [place], [place], and [place]  Phone: [phone]

## 2017-04-19 ENCOUNTER — TRANSFERRED RECORDS (OUTPATIENT)
Dept: HEALTH INFORMATION MANAGEMENT | Facility: CLINIC | Age: 57
End: 2017-04-19

## 2017-04-24 ENCOUNTER — OFFICE VISIT (OUTPATIENT)
Dept: FAMILY MEDICINE | Facility: CLINIC | Age: 57
End: 2017-04-24
Payer: COMMERCIAL

## 2017-04-24 VITALS
BODY MASS INDEX: 31.45 KG/M2 | HEIGHT: 61 IN | SYSTOLIC BLOOD PRESSURE: 122 MMHG | HEART RATE: 99 BPM | DIASTOLIC BLOOD PRESSURE: 82 MMHG | RESPIRATION RATE: 16 BRPM | WEIGHT: 166.6 LBS | OXYGEN SATURATION: 99 % | TEMPERATURE: 97.5 F

## 2017-04-24 DIAGNOSIS — C50.911 MALIGNANT NEOPLASM OF RIGHT FEMALE BREAST, UNSPECIFIED SITE OF BREAST: ICD-10-CM

## 2017-04-24 DIAGNOSIS — Z13.6 CARDIOVASCULAR SCREENING; LDL GOAL LESS THAN 70: ICD-10-CM

## 2017-04-24 DIAGNOSIS — Z11.59 NEED FOR HEPATITIS C SCREENING TEST: ICD-10-CM

## 2017-04-24 DIAGNOSIS — E11.65 TYPE 2 DIABETES MELLITUS WITH HYPERGLYCEMIA, WITHOUT LONG-TERM CURRENT USE OF INSULIN (H): Primary | ICD-10-CM

## 2017-04-24 PROBLEM — Z09 HOSPITAL DISCHARGE FOLLOW-UP: Status: RESOLVED | Noted: 2017-03-21 | Resolved: 2017-04-24

## 2017-04-24 PROCEDURE — 99207 C FOOT EXAM  NO CHARGE: CPT | Performed by: FAMILY MEDICINE

## 2017-04-24 PROCEDURE — 99214 OFFICE O/P EST MOD 30 MIN: CPT | Performed by: FAMILY MEDICINE

## 2017-04-24 RX ORDER — ASPIRIN 81 MG/1
81 TABLET, CHEWABLE ORAL DAILY
Qty: 36 TABLET | Refills: 0 | Status: ON HOLD | OUTPATIENT
Start: 2017-04-24 | End: 2017-12-28

## 2017-04-24 NOTE — PROGRESS NOTES
SUBJECTIVE:                                                    Teresa Hahn is a 56 year old female who presents to clinic today for the following health issues:      Medication Followup of metformin    Taking Medication as prescribed: yes    Side Effects:  None    Medication Helping Symptoms:  Blood sugars have not gone over 179.           Problem list and histories reviewed & adjusted, as indicated.  Additional history:   See under ROS    Patient Active Problem List   Diagnosis     Acute reaction to stress     Arthropathy     Obesity     GERD (gastroesophageal reflux disease)     CARDIOVASCULAR SCREENING; LDL GOAL LESS THAN 160     Anxiety     Health Care Home     TBI (traumatic brain injury) (H)     ADD (attention deficit disorder)     Major depressive disorder, single episode in full remission (H)     Malignant neoplasm of right female breast, unspecified site of breast (H)     Hospital discharge follow-up     Type 2 diabetes mellitus with hyperglycemia, without long-term current use of insulin (H)       Current Outpatient Prescriptions   Medication Sig Dispense Refill     metFORMIN (GLUCOPHAGE) 1000 MG tablet Take 1 tablet (1,000 mg) by mouth 2 times daily (with meals) 180 tablet 0     blood glucose monitoring (JOLLY MICROLET) lancets Use to test blood sugar 4 times daily or as directed.  Ok to substitute alternative if insurance prefers. 1 Box prn     blood glucose monitoring (JOLLY CONTOUR NEXT) test strip Use to test blood sugar 4 times daily or as directed.  Ok to substitute alternative if insurance prefers. 200 strip 1     blood glucose monitoring (ACCU-CHEK SHERYL SMARTVIEW) meter device kit   0     blood glucose monitoring (ACCU-CHEK FASTCLIX) lancets   0     ondansetron (ZOFRAN-ODT) 4 MG ODT tab   0     aspirin 81 MG chewable tablet Take 1 tablet (81 mg) by mouth daily 36 tablet 0     blood glucose (NO BRAND SPECIFIED) lancets standard Use to test blood sugar 4 times daily or as directed. Anticipate  "decrease in the future. 1 Box 0     blood glucose monitoring (NO BRAND SPECIFIED) test strip Use to test blood sugars 4 times daily or as directed. Anticipate decreasing in the future. 100 strip prn           Reviewed and updated as needed this visit by clinical staff  Tobacco  Allergies  Meds  Med Hx  Surg Hx  Fam Hx  Soc Hx      Reviewed and updated as needed this visit by Provider         ROS:  CONSTITUTIONAL:NEGATIVE for fever, chills, change in weight x has lost some weight.  RESP:NEGATIVE for significant cough or SOB  CV: NEGATIVE for chest pain, palpitations or peripheral edema  PSYCHIATRIC: NEGATIVE for changes in mood or affect    Metformin working.  Before meal < 130 and after meal < 180.    Has been to one session of education.    Feet will get tingling.    Still trying to decide what to do regarding her breast cancer. She has seen 2 different Oncologists. One was willing to do chemo, the other advises against it. If getting chemo, would want to do this in [place].     OBJECTIVE:                                                    /82 (BP Location: Right arm, Patient Position: Chair, Cuff Size: Adult Regular)  Pulse 99  Temp 97.5  F (36.4  C) (Oral)  Resp 16  Ht 5' 0.75\" (1.543 m)  Wt 166 lb 9.6 oz (75.6 kg)  SpO2 99%  BMI 31.74 kg/m2  Body mass index is 31.74 kg/(m^2).  GENERAL APPEARANCE: alert and no distress  RESP: lungs clear to auscultation - no rales, rhonchi or wheezes  CV: regular rates and rhythm  MS: extremities normal- no gross deformities noted  PSYCH: mentation appears normal and affect normal/bright    DP pulse present bilaterally.  No sores or ulcerations.  Little callous.  Monofilament exam normal.    Lab Results   Component Value Date    A1C 9.2 03/17/2017            ASSESSMENT/PLAN:                                                      Type 2 diabetes mellitus with hyperglycemia, without long-term current use of insulin (H)  Sounds like she is making headway. " Continue to follow. Recommend another HgbA1C in 3 months.   She is attending diabetic ed. Has her daughter going with her.   Discussed foot care.   - aspirin 81 MG chewable tablet; Take 1 tablet (81 mg) by mouth daily  - Lipid Profile with reflex to direct LDL; Future  - Hemoglobin A1c; Future  - **TSH with free T4 reflex FUTURE anytime; Future  - **Albumin Random Urine Quant FUTURE anytime; Future  - FOOT EXAM    Malignant neoplasm of right female breast, unspecified site of breast (H)  Did discuss this with her some. I did not have all the records at this time. She is looking into things closely. At this time, she is anticipating talking with Dr. Ochoa after she talks with the other Oncologist.     CARDIOVASCULAR SCREENING; LDL GOAL LESS THAN 70      Need for hepatitis C screening test    - **Hepatitis C Screen Reflex to RNA FUTURE anytime; Future      Patient Instructions   Let's see you again in 3 months.    It would be great if you do fasting labs a few days in advance; we can discuss results.  Fasting means nothing to eat or drink for 10 hours. However, you do want to take your medications. We DO encourage you to drink water.          Yocasta Moreno MD, MD  [facility]

## 2017-04-24 NOTE — MR AVS SNAPSHOT
After Visit Summary   4/24/2017    Teresa Hahn    MRN: [id number]           Patient Information     Date Of Birth          1960        Visit Information        Provider Department      4/24/2017 11:10 AM Yocasta Moreno MD [facility]        Today's Diagnoses     Type 2 diabetes mellitus with hyperglycemia, without long-term current use of insulin (H)    -  1    CARDIOVASCULAR SCREENING; LDL GOAL LESS THAN 70          Care Instructions    Let's see you again in 3 months.    It would be great if you do fasting labs a few days in advance; we can discuss results.  Fasting means nothing to eat or drink for 10 hours. However, you do want to take your medications. We DO encourage you to drink water.            Follow-ups after your visit        Your next 10 appointments already scheduled     May 03, 2017 12:30 PM CDT   Diabetic Education with [place] [facility]   [facility]    [address] [facility] [address]  [facility] [address] [phone]              Who to contact     If you have questions or need follow up information about today's clinic visit or your schedule please contact [facility] directly at [phone].  Normal or non-critical lab and imaging results will be communicated to you by MyChart, letter or phone within 4 business days after the clinic has received the results. If you do not hear from us within 7 days, please contact the clinic through MyChart or phone. If you have a critical or abnormal lab result, we will notify you by phone as soon as possible.  Submit refill requests through PrintToPeer or call your pharmacy and they will forward the refill request to us. Please allow 3 business days for your refill to be completed.          Additional Information About Your Visit        MyChart Information     PrintToPeer lets you send messages to your doctor, view your test results, renew your prescriptions,  "schedule appointments and more. To sign up, go to [email or web address] . Click on \"Log in\" on the left side of the screen, which will take you to the Welcome page. Then click on \"Sign up Now\" on the right side of the page.     You will be asked to enter the access code listed below, as well as some personal information. Please follow the directions to create your username and password.     Your access code is: [email or web address]  Expires: 2017  5:33 PM     Your access code will  in 90 days. If you need help or a new code, please call your [place] clinic or [phone].        Care EveryWhere ID     This is your Care EveryWhere ID. This could be used by other organizations to access your [place] medical records  [id number]        Your Vitals Were     Pulse Temperature Respirations Height Pulse Oximetry BMI (Body Mass Index)    99 97.5  F (36.4  C) (Oral) 16 5' 0.75\" (1.543 m) 99% 31.74 kg/m2       Blood Pressure from Last 3 Encounters:   17 122/82   17 122/80   17 118/74    Weight from Last 3 Encounters:   17 166 lb 9.6 oz (75.6 kg)   17 178 lb 12.8 oz (81.1 kg)   17 176 lb 3.2 oz (79.9 kg)              Today, you had the following     No orders found for display         Where to get your medicines      These medications were sent to [place] [address] [place] [address] [address]     Phone:  [phone]     aspirin 81 MG chewable tablet          Primary Care Provider Office Phone # Fax #    Yocasta Moreno [phone]       [facility] [address] [address]  [facility] [address]        Thank you!     Thank you for choosing [facility]  for your care. Our goal is always to provide you with excellent care. Hearing back from our patients is one way we can continue to improve our services. Please take a few minutes to complete the written survey that you may receive in the mail after " your visit with us. Thank you!             Your Updated Medication List - Protect others around you: Learn how to safely use, store and throw away your medicines at www.disposemymeds.org.          This list is accurate as of: 4/24/17 12:06 PM.  Always use your most recent med list.                   Brand Name Dispense Instructions for use    aspirin 81 MG chewable tablet     36 tablet    Take 1 tablet (81 mg) by mouth daily       blood glucose lancets standard    no brand specified    1 Box    Use to test blood sugar 4 times daily or as directed. Anticipate decrease in the future.       * blood glucose monitoring lancets          * blood glucose monitoring lancets     1 Box    Use to test blood sugar 4 times daily or as directed.  Ok to substitute alternative if insurance prefers.       blood glucose monitoring meter device kit          * blood glucose monitoring test strip    no brand specified    100 strip    Use to test blood sugars 4 times daily or as directed. Anticipate decreasing in the future.       * blood glucose monitoring test strip    JOLLY CONTOUR NEXT    200 strip    Use to test blood sugar 4 times daily or as directed.  Ok to substitute alternative if insurance prefers.       metFORMIN 1000 MG tablet    GLUCOPHAGE    180 tablet    Take 1 tablet (1,000 mg) by mouth 2 times daily (with meals)       ondansetron 4 MG ODT tab    ZOFRAN-ODT         * Notice:  This list has 4 medication(s) that are the same as other medications prescribed for you. Read the directions carefully, and ask your doctor or other care provider to review them with you.

## 2017-04-24 NOTE — PATIENT INSTRUCTIONS
Let's see you again in 3 months.    It would be great if you do fasting labs a few days in advance; we can discuss results.  Fasting means nothing to eat or drink for 10 hours. However, you do want to take your medications. We DO encourage you to drink water.

## 2017-04-24 NOTE — NURSING NOTE
"Chief Complaint   Patient presents with     Diabetes       Initial /82 (BP Location: Right arm, Patient Position: Chair, Cuff Size: Adult Regular)  Pulse 99  Temp 97.5  F (36.4  C) (Oral)  Resp 16  Ht 5' 0.75\" (1.543 m)  Wt 166 lb 9.6 oz (75.6 kg)  SpO2 99%  BMI 31.74 kg/m2 Estimated body mass index is 31.74 kg/(m^2) as calculated from the following:    Height as of this encounter: 5' 0.75\" (1.543 m).    Weight as of this encounter: 166 lb 9.6 oz (75.6 kg).  Medication Reconciliation: complete   Lashawn Rob, VERA      "

## 2017-04-26 ENCOUNTER — CARE COORDINATION (OUTPATIENT)
Dept: CARE COORDINATION | Facility: CLINIC | Age: 57
End: 2017-04-26

## 2017-04-26 ENCOUNTER — TRANSFERRED RECORDS (OUTPATIENT)
Dept: HEALTH INFORMATION MANAGEMENT | Facility: CLINIC | Age: 57
End: 2017-04-26

## 2017-04-26 NOTE — PROGRESS NOTES
Clinic Care Coordination Contact  Care Team Conversations    CCRN performed chart review today.    Patient was seen by PCP a few days ago on 04/24/2017- See OV notes for details.  F/u recommended in 3 months.    See 04/18/2017 and 04/13/2017 CCSW notes as well.    CCRN will plan to outreach to patient with CCSW on 05/09/2017.  Will remain available to patient in that timeframe should she need anything.       Jeniffer Veliz, RN  [facility]  Clinic Care Coordinator - [place] and [place] Locations   Direct:  [phone] (voicemail available)

## 2017-05-03 ENCOUNTER — ALLIED HEALTH/NURSE VISIT (OUTPATIENT)
Dept: EDUCATION SERVICES | Facility: CLINIC | Age: 57
End: 2017-05-03
Payer: COMMERCIAL

## 2017-05-03 DIAGNOSIS — E11.65 TYPE 2 DIABETES MELLITUS WITH HYPERGLYCEMIA, WITHOUT LONG-TERM CURRENT USE OF INSULIN (H): Primary | ICD-10-CM

## 2017-05-03 PROCEDURE — G0108 DIAB MANAGE TRN  PER INDIV: HCPCS

## 2017-05-03 RX ORDER — LANCETS
EACH MISCELLANEOUS
Qty: 1 BOX | Refills: 1 | Status: SHIPPED | OUTPATIENT
Start: 2017-05-03 | End: 2019-07-19

## 2017-05-03 NOTE — PROGRESS NOTES
"Diabetes Self Management Training: Follow-up Visit    Teresa Hahn presents today for education and evaluation of glucose control related to Type 2 diabetes.    She is accompanied by self    Patient's diabetes management related comments/concerns: diet for upcoming chemo    Patient would like this visit to be focused around the following diabetes-related behaviors and goals: Assistance with making lifestyle changes, Healthy Eating and Monitoring    ASSESSMENT:  Patient Problem List reviewed for relevant medical history and current medical status.    Current Diabetes Management per Patient:  Taking diabetes medications?   yes:     Diabetes Medication(s)     Biguanides Sig    metFORMIN (GLUCOPHAGE) 1000 MG tablet Take 1 tablet (1,000 mg) by mouth 2 times daily (with meals)          *Abbreviated insulin dose documentation key: Insulin Trade Name (lyztvikgd-nqaba-wkijup-bedtime) - i.e. Humalog 5-5-5-0 (Humalog 5 units at breakfast, 5 units at lunch, and 5 units at dinner).    Patient glucose self monitoring as follows: 2 times daily.   BG meter: Contour Next  meter  BG results:       BG values are: In goal  Patient's most recent   Lab Results   Component Value Date    A1C 9.2 03/17/2017    is not meeting goal of <7.0    Nutrition:  Patient eats 3 meals per day and is trying to consume 2-3 carbs per meal.  Has made many diet changes.  Watching portions and sweets.  Is worried about nutrition for upcoming chemo treatments this summer.    Biggest Challenge to Healthy Eating: cooking    Physical Activity:    Not assessed    Diabetes Complications:  Not discussed today.    Vitals:  There were no vitals taken for this visit.  Estimated body mass index is 31.74 kg/(m^2) as calculated from the following:    Height as of 4/24/17: 1.543 m (5' 0.75\").    Weight as of 4/24/17: 75.6 kg (166 lb 9.6 oz).   Last 3 BP:   BP Readings from Last 3 Encounters:   04/24/17 122/82   03/21/17 122/80   03/07/17 118/74       History   Smoking " Status     Never Smoker   Smokeless Tobacco     Never Used       Labs:  Lab Results   Component Value Date    A1C 9.2 03/17/2017     Lab Results   Component Value Date     11/05/2010     No results found for: LDL  No results found for: HDL]  GFR Estimate   Date Value Ref Range Status   03/17/2017 >60 >60 ml/min/1.73m2 Final     GFR Estimate If Black   Date Value Ref Range Status   03/17/2017 >60 >60 ml/min/1.73m2 Final     Lab Results   Component Value Date    CR 0.72 03/17/2017     No results found for: MICROALBUMIN    Health Beliefs and Attitudes:   Patient Activation Measure Survey Score:  ANGELIC Score (Last Two) 5/18/2010   ANGELIC Raw Score 44   Activation Score 70.8   ANGELIC Level 4       Stage of Change: ACTION (Actively working towards change)    Progress toward meeting diabetes-related behavioral goals:    GOALS % Met Goal   Healthy Eating  100   Physical Activity     Monitoring  100   Medication Taking  100   Problem Solving     Healthy Coping     Risk Reduction           Diabetes knowledge and skills assessment:     Patient is knowledgeable in diabetes management concepts related to: Healthy Eating, Monitoring and Taking Medication    Patient needs further education on the following diabetes management concepts: Reducing Risks    Barriers to Learning Assessment: No Barriers identified    Based on learning assessment above, most appropriate setting for further diabetes education would be: Group class or Individual setting.    INTERVENTION:  Reviewed patient's BG report, which looks outstanding.  Patient should continue to test 1-2x/day and vary the times (premeal and postmeal).  Continue working on diet.  Investigate convenience foods that will work during chemo when patient's ability to cook will be limited.  Recommended small, frequent meals if nausea/vomiting are an issue.  May want to meet the the oncology clinic dietitian.    Education provided today on:  AADE Self-Care Behaviors:  Healthy Eating:  consistency in amount, composition, and timing of food intake, weight reduction, label reading and good nutrition for cancer  Monitoring: frequency of monitoring - likely can reduce testing to 1x/day    Opportunities for ongoing education and support in diabetes-self management were discussed.    Pt verbalized understanding of concepts discussed and recommendations provided today.       Education Materials Provided:  Meal Ideas    PLAN:  Meal Plan Recommendation: eat 3 meals a day, have small snacks between meals, if needed and Aim for 2-3 carb servings at meals and 1-2 carb servings at snacks  Check blood sugars 1-2x/day    FOLLOW-UP:  Follow-up as needed.     Ongoing plan for education and support: Written resources (magazines, books, etc.) and Follow-up visit with diabetes educator in 3-4 months    JOHN Velasquez CDE    Time Spent: 60 minutes  Encounter Type: Individual    Any diabetes medication dose changes were made via the CDE Protocol and Collaborative Practice Agreement with the patient's referring provider. A copy of this encounter was shared with the provider.

## 2017-05-03 NOTE — MR AVS SNAPSHOT
"              After Visit Summary   5/3/2017    Teresa Hahn    MRN: [id number]           Patient Information     Date Of Birth          1960        Visit Information        Provider Department      5/3/2017 12:30 PM [place] DIABETIC ED RESOURCE [facility]        Today's Diagnoses     Type 2 diabetes mellitus with hyperglycemia, without long-term current use of insulin (H)    -  1       Follow-ups after your visit        Who to contact     If you have questions or need follow up information about today's clinic visit or your schedule please contact [facility] directly at [phone].  Normal or non-critical lab and imaging results will be communicated to you by [employer], letter or phone within 4 business days after the clinic has received the results. If you do not hear from us within 7 days, please contact the clinic through [employer] or phone. If you have a critical or abnormal lab result, we will notify you by phone as soon as possible.  Submit refill requests through [employer] or call your pharmacy and they will forward the refill request to us. Please allow 3 business days for your refill to be completed.          Additional Information About Your Visit        MyChart Information     [employer] lets you send messages to your doctor, view your test results, renew your prescriptions, schedule appointments and more. To sign up, go to [email or web address] . Click on \"Log in\" on the left side of the screen, which will take you to the Welcome page. Then click on \"Sign up Now\" on the right side of the page.     You will be asked to enter the access code listed below, as well as some personal information. Please follow the directions to create your username and password.     Your access code is: [email or web address]  Expires: 2017  5:33 PM     Your access code will  in 90 days. If you need help or a new code, please call your [facility] or [phone].        Care EveryWhere ID  "    This is your Care EveryWhere ID. This could be used by other organizations to access your [facility] medical records  [id number]         Blood Pressure from Last 3 Encounters:   04/24/17 122/82   03/21/17 122/80   03/07/17 118/74    Weight from Last 3 Encounters:   04/24/17 75.6 kg (166 lb 9.6 oz)   03/21/17 81.1 kg (178 lb 12.8 oz)   03/07/17 79.9 kg (176 lb 3.2 oz)              We Performed the Following     DIABETES EDUCATION - Individual  []          Today's Medication Changes          These changes are accurate as of: 5/3/17  2:23 PM.  If you have any questions, ask your nurse or doctor.               These medicines have changed or have updated prescriptions.        Dose/Directions    * blood glucose monitoring lancets   This may have changed:  Another medication with the same name was added. Make sure you understand how and when to take each.        Refills:  0       * blood glucose monitoring lancets   This may have changed:  Another medication with the same name was added. Make sure you understand how and when to take each.   Used for:  Type 2 diabetes mellitus with hyperglycemia, without long-term current use of insulin (H)        Use to test blood sugar 4 times daily or as directed.  Ok to substitute alternative if insurance prefers.   Quantity:  1 Box   Refills:  prn       * blood glucose monitoring lancets   This may have changed:  You were already taking a medication with the same name, and this prescription was added. Make sure you understand how and when to take each.   Used for:  Type 2 diabetes mellitus with hyperglycemia, without long-term current use of insulin (H)        Use to test blood sugar 2 times daily or as directed.  Ok to substitute alternative if insurance prefers.   Quantity:  1 Box   Refills:  1       * Notice:  This list has 3 medication(s) that are the same as other medications prescribed for you. Read the directions carefully, and ask your doctor or other care provider to  review them with you.         Where to get your medicines      These medications were sent to [facility] [address] [facility] - [place], [place] - [address]  [address], [facility] [address]     Phone:  [phone]     blood glucose monitoring lancets                Primary Care Provider Office Phone # Fax #    Yocasta Moreno [phone]       [facility] [address]  [facility] [address]        Thank you!     Thank you for choosing [facility]  for your care. Our goal is always to provide you with excellent care. Hearing back from our patients is one way we can continue to improve our services. Please take a few minutes to complete the written survey that you may receive in the mail after your visit with us. Thank you!             Your Updated Medication List - Protect others around you: Learn how to safely use, store and throw away your medicines at www.disposemymeds.org.          This list is accurate as of: 5/3/17  2:23 PM.  Always use your most recent med list.                   Brand Name Dispense Instructions for use    aspirin 81 MG chewable tablet     36 tablet    Take 1 tablet (81 mg) by mouth daily       blood glucose lancets standard    no brand specified    1 Box    Use to test blood sugar 4 times daily or as directed. Anticipate decrease in the future.       * blood glucose monitoring lancets          * blood glucose monitoring lancets     1 Box    Use to test blood sugar 4 times daily or as directed.  Ok to substitute alternative if insurance prefers.       * blood glucose monitoring lancets     1 Box    Use to test blood sugar 2 times daily or as directed.  Ok to substitute alternative if insurance prefers.       blood glucose monitoring meter device kit          * blood glucose monitoring test strip    no brand specified    100 strip    Use to test blood sugars 4 times daily or as directed. Anticipate decreasing in the future.       * blood glucose  monitoring test strip    JOLLY CONTOUR NEXT    200 strip    Use to test blood sugar 4 times daily or as directed.  Ok to substitute alternative if insurance prefers.       metFORMIN 1000 MG tablet    GLUCOPHAGE    180 tablet    Take 1 tablet (1,000 mg) by mouth 2 times daily (with meals)       ondansetron 4 MG ODT tab    ZOFRAN-ODT         * Notice:  This list has 5 medication(s) that are the same as other medications prescribed for you. Read the directions carefully, and ask your doctor or other care provider to review them with you.

## 2017-05-09 ENCOUNTER — CARE COORDINATION (OUTPATIENT)
Dept: CARE COORDINATION | Facility: CLINIC | Age: 57
End: 2017-05-09

## 2017-05-09 DIAGNOSIS — C50.911 MALIGNANT NEOPLASM OF RIGHT FEMALE BREAST, UNSPECIFIED SITE OF BREAST: Primary | ICD-10-CM

## 2017-05-09 NOTE — PROGRESS NOTES
See recent care coordination note.    Got another message from other Care Coordinator asking for [facility].

## 2017-05-09 NOTE — PROGRESS NOTES
"Clinic Care Coordination Contact  Care Team Conversations    CCRN and CCSW outreached to patient today via Conference Call. She reports that she decided to continue Oncology Care through the [facility] [place] office (Dr. Ochoa) instead of closer to her home in [place], [place].   She states that they offer much more support (ie. Nutritionist, etc) at that location and she feels it would be most convenient to stay there.  She had port placement yesterday (05/08/2017).  She is recovering well from that procedure today; managing pain and maintaining dry/intact dressing until she is able to remove them 3-days post procedure.  She has been struggling to sleep since port placement, but resumed use of wedge pillow that she has in the home (post-mastectomy).  She denies any current symptoms of concern at this time.   The current plan of care from Oncology indicates that she would start chemotherapy treatments next week; patient would like to defer initiation of therapy until AFTER Mother's Day on 05/14/2017.  The tentative plan is to have chemotherapy x 12 weeks [4 treatments with 3 weeks interim each session.]   She was informed that she will be immunocompromised during treatment; CCRN reviewed importance of proper hand hygeine, nutrition, sleep, stress reduction and no exposure to anyone who is ill/potentially ill during this timeframe.    Reminded patient that her Oncology Team will review this information in detail with her prior to first treatment. She has future appointment with [facility] tomorrow, Thurs 05/10/2017, to discuss further; her daughter plans to attend this appointment with her and to all chemotherapy appointments (except the first round).    Her  is not able to take much time off of work to be at her appointments as he just started a new job.     CCRN and CCSW reviewed resources for wigs (patient wants a \"halo\" type wig for summer months to reduce increased heat on head - only partial hair " piece and patient wears a hat over the top) and MH/individual counseling options going forward.   Encouraged patient to continue to outreach to her Care Team members and providers for support and resources.     Patient would like a referral to [facility] ([email or web address]); preferably [facility] ([facility]) or [facility] locations.  She wants to ensure that she can maintain her strength and endurance during chemotherapy treatments.   CCRN advised will forward request on to PCP for review and possible orders.    Reminded patient if PCP is not comfortable writing this order, she could discuss with Oncologist as well for orders.          Plan:  CCRN forwarding to PCP for review of Rehab order request.   CCRN and CCSW will outreach to patient via conference call in 4 weeks and will remain available to patient in that timeframe should she need anything.   (She wanted to space out our next contact to ensure that she has the stamina to hold discussion as she will be enduring chemotherapy by the next time we speak to one another.)    Patient/Caregiver understanding: Patient agreed with plan.     Jeniffer Veliz, GISELLA  [facility]  Clinic Care Coordinator - [place] and [place] Locations   Direct:  [phone] (voicemail available)

## 2017-05-10 ENCOUNTER — TRANSFERRED RECORDS (OUTPATIENT)
Dept: HEALTH INFORMATION MANAGEMENT | Facility: CLINIC | Age: 57
End: 2017-05-10

## 2017-05-12 ENCOUNTER — OFFICE VISIT (OUTPATIENT)
Dept: FAMILY MEDICINE | Facility: CLINIC | Age: 57
End: 2017-05-12
Payer: COMMERCIAL

## 2017-05-12 VITALS
OXYGEN SATURATION: 96 % | HEART RATE: 94 BPM | BODY MASS INDEX: 31.43 KG/M2 | TEMPERATURE: 97.8 F | SYSTOLIC BLOOD PRESSURE: 110 MMHG | DIASTOLIC BLOOD PRESSURE: 76 MMHG | WEIGHT: 165 LBS

## 2017-05-12 DIAGNOSIS — R21 RASH: Primary | ICD-10-CM

## 2017-05-12 PROCEDURE — 99213 OFFICE O/P EST LOW 20 MIN: CPT | Performed by: NURSE PRACTITIONER

## 2017-05-12 RX ORDER — LIDOCAINE/PRILOCAINE 2.5 %-2.5%
CREAM (GRAM) TOPICAL
Refills: 0 | COMMUNITY
Start: 2017-05-10 | End: 2017-12-07

## 2017-05-12 RX ORDER — DEXAMETHASONE 4 MG/1
TABLET ORAL
Refills: 0 | COMMUNITY
Start: 2017-05-10 | End: 2017-12-07

## 2017-05-12 RX ORDER — NYSTATIN 10B UNIT
POWDER (EA) MISCELLANEOUS
Qty: 1 EACH | Refills: 0 | Status: SHIPPED | OUTPATIENT
Start: 2017-05-12 | End: 2017-07-11

## 2017-05-12 RX ORDER — TRIAMCINOLONE ACETONIDE 1 MG/G
CREAM TOPICAL
Qty: 30 G | Refills: 0 | Status: SHIPPED | OUTPATIENT
Start: 2017-05-12 | End: 2017-07-11

## 2017-05-12 RX ORDER — PROCHLORPERAZINE MALEATE 10 MG
TABLET ORAL
Refills: 0 | COMMUNITY
Start: 2017-05-10 | End: 2017-12-07

## 2017-05-12 NOTE — PROGRESS NOTES
SUBJECTIVE:                                                    Teresa Hahn is a 56 year old female who presents to clinic today for the following health issues:      Rash      Duration: Noticed it today- starting Chemo on Monday     Description  Location: Chest and under breasts  Itching: moderate    Intensity:  moderate    Accompanying signs and symptoms: None    History (similar episodes/previous evaluation): None    Precipitating or alleviating factors:  New exposures:  None  Recent travel: no      Therapies tried and outcome: hydrocortisone cream on right side only -  not effective     Feels slightly irritated, but no pain.  No itching.  She did put hydrocortisone cream under the L side.  She is otherwise well.  Recent port placement for chemo starting Monday.  No fever.  She did have a GI illness, but this has been resolving as anticipated.    Problem list and histories reviewed & adjusted, as indicated.  Additional history: as documented    Patient Active Problem List   Diagnosis     Acute reaction to stress     Arthropathy     Obesity     GERD (gastroesophageal reflux disease)     Anxiety     Health Care Home     TBI (traumatic brain injury) (H)     ADD (attention deficit disorder)     Major depressive disorder, single episode in full remission (H)     Malignant neoplasm of right female breast, unspecified site of breast (H)     Type 2 diabetes mellitus with hyperglycemia, without long-term current use of insulin (H)     Past Surgical History:   Procedure Laterality Date     MASTECTOMY, RECONSTRUCT BREAST, COMBINED Right 03/16/2017     SURGICAL HISTORY OF -   12/2004    r leg broken in several areas; now arthritis. 3 surgeries     SURGICAL HISTORY OF -   2008    right ankle fusion       Social History   Substance Use Topics     Smoking status: Never Smoker     Smokeless tobacco: Never Used     Alcohol use Yes      Comment: occasional     Family History   Problem Relation Age of Onset     C.A.D. Mother       numerous stents; over age 65.     HEART DISEASE Maternal Aunt      valve problem     Respiratory Maternal Aunt      COPD     Breast Cancer Other      paternal cousin     Other Cancer Brother 46     Lymphoma           Reviewed and updated as needed this visit by clinical staff  Tobacco  Allergies  Meds  Med Hx  Surg Hx  Fam Hx  Soc Hx      Reviewed and updated as needed this visit by Provider         ROS:  SEE HPI.    OBJECTIVE:                                                    /76  Pulse 94  Temp 97.8  F (36.6  C) (Oral)  Wt 165 lb (74.8 kg)  SpO2 96%  BMI 31.43 kg/m2  Body mass index is 31.43 kg/(m^2).  GENERAL: healthy, alert and no distress  SKIN: Scattered erythematous papules beneath breasts bilaterally, R>L.  Small area of generalized erythematous patch beneath each breast.  Skin intact.  Moist.  PSYCH: mentation appears normal, affect normal/bright    Diagnostic Test Results:  none      ASSESSMENT/PLAN:                                                      1. Rash  56 y.o. Female, hx of DM II, breast cancer, starting chemo on MOnday.  Recent onset rash, appears fungal.  Use nystatin for treatment, monitor.  May use topical steroid for some of the irritation as well.  Pt agrees with plan and verbalized understanding.  - nystatin POWD; Apply twice daily to affected skin.  Dispense: 1 each; Refill: 0  - triamcinolone (KENALOG) 0.1 % cream; Apply sparingly to affected area three times daily for 14 days.  Dispense: 30 g; Refill: 0    CARL Dotson [facility]

## 2017-05-12 NOTE — NURSING NOTE
"Chief Complaint   Patient presents with     Derm Problem       Initial /76  Pulse 94  Temp 97.8  F (36.6  C) (Oral)  Wt 165 lb (74.8 kg)  SpO2 96%  BMI 31.43 kg/m2 Estimated body mass index is 31.43 kg/(m^2) as calculated from the following:    Height as of 4/24/17: 5' 0.75\" (1.543 m).    Weight as of this encounter: 165 lb (74.8 kg).  Medication Reconciliation: Complete    Rhea VANG M.A.    "

## 2017-05-12 NOTE — MR AVS SNAPSHOT
"              After Visit Summary   2017    Teresa Hahn    MRN: [id number]           Patient Information     Date Of Birth          1960        Visit Information        Provider Department      2017 3:00 PM Celeste Valenzuela Ra, APRN CNP [facility]        Today's Diagnoses     Rash    -  1      Care Instructions    Monitor your symptoms.  Apply the cream and the powder.  Follow up if needed.        Follow-ups after your visit        Who to contact     If you have questions or need follow up information about today's clinic visit or your schedule please contact [facility] directly at [phone].  Normal or non-critical lab and imaging results will be communicated to you by Nanosyshart, letter or phone within 4 business days after the clinic has received the results. If you do not hear from us within 7 days, please contact the clinic through Nanosyshart or phone. If you have a critical or abnormal lab result, we will notify you by phone as soon as possible.  Submit refill requests through WeLab or call your pharmacy and they will forward the refill request to us. Please allow 3 business days for your refill to be completed.          Additional Information About Your Visit        MyChart Information     WeLab lets you send messages to your doctor, view your test results, renew your prescriptions, schedule appointments and more. To sign up, go to [email or web address] . Click on \"Log in\" on the left side of the screen, which will take you to the Welcome page. Then click on \"Sign up Now\" on the right side of the page.     You will be asked to enter the access code listed below, as well as some personal information. Please follow the directions to create your username and password.     Your access code is: [email or web address]  Expires: 2017  5:33 PM     Your access code will  in 90 days. If you need help or a new code, please call your [facility] or [phone].   "      Care EveryWhere ID     This is your Care EveryWhere ID. This could be used by other organizations to access your [place] medical records  [id number]        Your Vitals Were     Pulse Temperature Pulse Oximetry BMI (Body Mass Index)          94 97.8  F (36.6  C) (Oral) 96% 31.43 kg/m2         Blood Pressure from Last 3 Encounters:   05/12/17 110/76   04/24/17 122/82   03/21/17 122/80    Weight from Last 3 Encounters:   05/12/17 165 lb (74.8 kg)   04/24/17 166 lb 9.6 oz (75.6 kg)   03/21/17 178 lb 12.8 oz (81.1 kg)              Today, you had the following     No orders found for display         Today's Medication Changes          These changes are accurate as of: 5/12/17  3:39 PM.  If you have any questions, ask your nurse or doctor.               Start taking these medicines.        Dose/Directions    nystatin Powd   Used for:  Rash   Started by:  Celeste Valenzuela Ra, APRN CNP        Apply twice daily to affected skin.   Quantity:  1 each   Refills:  0       triamcinolone 0.1 % cream   Commonly known as:  KENALOG   Used for:  Rash   Started by:  Celeste Valenzuela Ra, APRN CNP        Apply sparingly to affected area three times daily for 14 days.   Quantity:  30 g   Refills:  0            Where to get your medicines      These medications were sent to [facility] [address]  [address] [facility] [address]     Phone:  [phone]     nystatin Powd    triamcinolone 0.1 % cream                Primary Care Provider Office Phone # Fax #    Yocasta Moreno [phone]       [facility] [address] [facility] [address]        Thank you!     Thank you for choosing [facility]  for your care. Our goal is always to provide you with excellent care. Hearing back from our patients is one way we can continue to improve our services. Please take a few minutes to complete the written survey that you may receive in the mail  after your visit with us. Thank you!             Your Updated Medication List - Protect others around you: Learn how to safely use, store and throw away your medicines at www.disposemymeds.org.          This list is accurate as of: 5/12/17  3:39 PM.  Always use your most recent med list.                   Brand Name Dispense Instructions for use    aspirin 81 MG chewable tablet     36 tablet    Take 1 tablet (81 mg) by mouth daily       blood glucose lancets standard    no brand specified    1 Box    Use to test blood sugar 4 times daily or as directed. Anticipate decrease in the future.       * blood glucose monitoring lancets          * blood glucose monitoring lancets     1 Box    Use to test blood sugar 4 times daily or as directed.  Ok to substitute alternative if insurance prefers.       * blood glucose monitoring lancets     1 Box    Use to test blood sugar 2 times daily or as directed.  Ok to substitute alternative if insurance prefers.       blood glucose monitoring meter device kit          * blood glucose monitoring test strip    no brand specified    100 strip    Use to test blood sugars 4 times daily or as directed. Anticipate decreasing in the future.       * blood glucose monitoring test strip    JOLLY CONTOUR NEXT    200 strip    Use to test blood sugar 4 times daily or as directed.  Ok to substitute alternative if insurance prefers.       dexamethasone 4 MG tablet    DECADRON         lidocaine-prilocaine cream    EMLA         metFORMIN 1000 MG tablet    GLUCOPHAGE    180 tablet    Take 1 tablet (1,000 mg) by mouth 2 times daily (with meals)       nystatin Powd     1 each    Apply twice daily to affected skin.       ondansetron 4 MG ODT tab    ZOFRAN-ODT         prochlorperazine 10 MG tablet    COMPAZINE         triamcinolone 0.1 % cream    KENALOG    30 g    Apply sparingly to affected area three times daily for 14 days.       * Notice:  This list has 5 medication(s) that are the same as other  medications prescribed for you. Read the directions carefully, and ask your doctor or other care provider to review them with you.

## 2017-05-22 ENCOUNTER — TRANSFERRED RECORDS (OUTPATIENT)
Dept: HEALTH INFORMATION MANAGEMENT | Facility: CLINIC | Age: 57
End: 2017-05-22

## 2017-06-05 ENCOUNTER — TRANSFERRED RECORDS (OUTPATIENT)
Dept: HEALTH INFORMATION MANAGEMENT | Facility: CLINIC | Age: 57
End: 2017-06-05

## 2017-06-06 ENCOUNTER — CARE COORDINATION (OUTPATIENT)
Dept: CARE COORDINATION | Facility: CLINIC | Age: 57
End: 2017-06-06

## 2017-06-06 NOTE — PROGRESS NOTES
"Clinic Care Coordination Contact      Referral Source: PCP  Clinical Data: Care Coordinator Outreach  Spoke to pt who stated that she wants to wait on scheduling with [facility] until after her chemo. Pt stated that she would like to schedule with a therapist though was not feeling up to contacting scheduling today. Pt stated that she can update SW or else asked that SW call her again in a week for follow-up. Pt voices that she feels that she has \"chemo brain\" when talking about recalling and decision making. Pt stated that she has been checking her blood sugars (last night: 199 and this mornin). Pt stated that she is taking steroids and thus feels that the numbers are not accurate. Pt stated that she would like to get an A1C check-up. SW updated RN on the conversation with pt.     Plan: Care Coordinator will try to reach patient again in one week unless contacted prior.    WES Larson, [facility]  Social Work - Care Coordinator  [facility]- [place], [place], and [place]  Phone: [phone]        "

## 2017-06-13 ENCOUNTER — CARE COORDINATION (OUTPATIENT)
Dept: CARE COORDINATION | Facility: CLINIC | Age: 57
End: 2017-06-13

## 2017-06-13 NOTE — PROGRESS NOTES
Clinic Care Coordination Contact  [facility]/Voicemail    Referral Source: PCP  Clinical Data: Care Coordinator Outreach  Outreach attempted x 1.  Left message on voicemail with call back information and requested return call.    Plan: Care Coordinator will try to reach patient again in 5-7 business days.    WES Larson, [facility]  Social Work - Care Coordinator  [facility]- [place], [place], and [place]  Phone: [phone]

## 2017-06-22 ENCOUNTER — CARE COORDINATION (OUTPATIENT)
Dept: CARE COORDINATION | Facility: CLINIC | Age: 57
End: 2017-06-22

## 2017-06-22 NOTE — PROGRESS NOTES
Clinic Care Coordination Contact  [facility]/Voicemail    Referral Source: PCP  Clinical Data: Care Coordinator Outreach  Outreach attempted x 2.  Left message on voicemail with call back information and requested return call. Pt will see PCP on 07/11/17, per chart review.    Plan: Care Coordinator mailed out care coordination introduction letter on 06/22/17. Care Coordinator will try to reach patient again in 5-7 business days.    WES Larson, [facility]  Social Work - Care Coordinator  [facility]- [place], [place], and [place]  Phone: [phone]

## 2017-06-22 NOTE — LETTER
[facility]  [address] [place] [address]      June 22, 2017      Teresa Hahn  [address]  [facility] [address]    Dear Teresa,  I am the Clinic Care Coordinator that works with your primary care provider's clinic. I recently tried to call and was unable to reach you. Below is a description of what Clinic Care Coordination is and how I can further assist you.     The Clinic Care Coordinator role is a Registered Nurse and/or  who understands the health care system. The goal of Clinic Care Coordination is to help you manage your health and improve access to the [facility] in the most efficient manner.  The Registered Nurse can assist you in meeting your health care goals by providing education, coordinating services, and strengthening the communication among your providers. The  can assist you with financial, behavioral, psychosocial, and chemical dependency and counseling/psychiatric resources.    Please feel free to keep this letter and contact information to contact me at [phone] with any further questions or concerns that may arise. We at [facility] are focused on providing you with the highest-quality healthcare experience possible and that all starts with you.       Sincerely,     Rodolfo Sanchez

## 2017-06-23 ENCOUNTER — CARE COORDINATION (OUTPATIENT)
Dept: CARE COORDINATION | Facility: CLINIC | Age: 57
End: 2017-06-23

## 2017-06-23 NOTE — PROGRESS NOTES
Clinic Care Coordination Contact    Situation: Patient chart reviewed by care coordinator.    Assessment:   Please see 06/22/2017 CCSW entry.      Plan/Recommendations: CCRN and CCSW will outreach to patient on 06/29/2017 for conference call.        Jeniffer Veliz RN  [facility]  Clinic Care Coordinator - [place] and [place] Locations   Direct:  [phone] (voicemail available)

## 2017-06-26 ENCOUNTER — TRANSFERRED RECORDS (OUTPATIENT)
Dept: HEALTH INFORMATION MANAGEMENT | Facility: CLINIC | Age: 57
End: 2017-06-26

## 2017-06-30 ENCOUNTER — CARE COORDINATION (OUTPATIENT)
Dept: CARE COORDINATION | Facility: CLINIC | Age: 57
End: 2017-06-30

## 2017-06-30 NOTE — PROGRESS NOTES
"Clinic Care Coordination Contact    CCRN and CCSW outreached to patient today via telephone conference call.   Patient reports that she is continuing with chemotherapy treatments and will not be done until late 07/2017.   Typically tends to feel \"crappy\" the first few days after treatment is done, then seems to improve. She noted the following concerns to CC Team today.         Worried about losing muscle mass and weight while undergoing chemotherapy.   Had attended PT in the past, prior to mastectomy, and received OP exercises. She would like to resume low-impact aquatic yoga and stretching.   CCRN advised that these low-impact therapies should be okay to perform, but to verify with Oncologist prior to initiating.       Receiving Neulasta injections from Oncology.  Feeling well, no current concerns for illness noted.       GI issues - notes cyclical diarrhea x a few days after chemo treatment; unpredictable.  1st Chemo cycle - had diarrhea 2 weeks later, 2nd Chemo cycle - had diarrhea 3 weeks later.   Has not taken any OTC anti-diarrheal medication, however Onc advised it would be okay to do so.  Patient would like her body to resolve naturally at this time.   Intermittent nausea without vomiting; using antiemetics (alt Compazine with Zofran) - typically 2x daily connie during chemo cycles.   Hx- diabetes; difficulty with BRAT diet as high in carbs.    She is using mary carmen to help curb nausea as well.  CCRN advised patient to monitor any foods that cause N/V or taste aversions during chemo in addition to continued use of mary carmen, and adding either peppermint or chamomile to her routine.  CCRN provided patient with home cares for nausea and reminded her the importance of monitoring for new/worsening GI issues including N/V and diarrhea which could cause her to become dehydrated. She verbalized understanding.   GERD symptoms - Onc provider recommended that she trial OTC Prilosec while undergoing chemo.  Patient concerned " about long-term effects of this medication.   CCRN advised patient to monitor GERD symptoms and if continuing, even after chemo is done, discuss with PCP to determine if further follow up needed.   Advised additional home cares for GERD including avoiding foods that trigger it, avoid laying flat 30-60min after eating and drinking plenty of water.    She denies any mouth sores present from chemo.     She has a future appointment with PCP on 07/11/2017 for diabetic follow up.   Future lab orders in place; CCRN advised patient to come to appointment fasting based on futured labs that could be drawn at her appointment.  She verbalized understanding and agreed with plan.     CCSW placing patient on INACTIVE status at this time but will remain available to her for any future needs.       Plan:  CCRN will outreach to patient in 2-4 weeks and will remain available to patient in that timeframe should she need anything.       Patient/Caregiver understanding: Patient agreed with plan.       Jeniffer Veliz RN  [facility]  Clinic Care Coordinator - [place] and [place] Locations   Direct:  [phone] (voicemail available)

## 2017-06-30 NOTE — PROGRESS NOTES
Clinic Care Coordination Contact  Care Team Conversations    Conference call held with pt and RN clinic care coordinator to follow-up with pt. Pt expressed interested in starting PT due to concerns about losing muscle mass. RN clinic care coordinator encouraged pt to discuss with her Oncologist should there be any limitation with chemotherapy. Pt stated that she has been having side effects with the chemo (diarrhea) though the side effect weeks vary per treatment cycle. Pt indicated that she will see her PCP on 07/11/17 and will address checking her A1C and choestrol. SW asked pt if there were further needs for resources or services. Pt declined at this time stating that she would like to focus on her chemotherapy. SW will not continue to follow pt and updated RN clinic care coordinator should pt have future needs to let SW know.    WES Larson, [facility]  Social Work - Care Coordinator  [facility]- [place], [place], and [place]  Phone: [phone]

## 2017-07-11 ENCOUNTER — OFFICE VISIT (OUTPATIENT)
Dept: FAMILY MEDICINE | Facility: CLINIC | Age: 57
End: 2017-07-11
Payer: COMMERCIAL

## 2017-07-11 VITALS
WEIGHT: 157.9 LBS | SYSTOLIC BLOOD PRESSURE: 112 MMHG | TEMPERATURE: 97.9 F | DIASTOLIC BLOOD PRESSURE: 72 MMHG | OXYGEN SATURATION: 97 % | RESPIRATION RATE: 16 BRPM | HEART RATE: 90 BPM | HEIGHT: 61 IN | BODY MASS INDEX: 29.81 KG/M2

## 2017-07-11 DIAGNOSIS — E78.5 HYPERLIPIDEMIA LDL GOAL <100: ICD-10-CM

## 2017-07-11 DIAGNOSIS — C50.211 MALIGNANT NEOPLASM OF UPPER-INNER QUADRANT OF RIGHT BREAST IN FEMALE, ESTROGEN RECEPTOR POSITIVE (H): ICD-10-CM

## 2017-07-11 DIAGNOSIS — E11.65 TYPE 2 DIABETES MELLITUS WITH HYPERGLYCEMIA, WITHOUT LONG-TERM CURRENT USE OF INSULIN (H): Primary | ICD-10-CM

## 2017-07-11 DIAGNOSIS — Z17.0 MALIGNANT NEOPLASM OF UPPER-INNER QUADRANT OF RIGHT BREAST IN FEMALE, ESTROGEN RECEPTOR POSITIVE (H): ICD-10-CM

## 2017-07-11 DIAGNOSIS — Z11.59 NEED FOR HEPATITIS C SCREENING TEST: ICD-10-CM

## 2017-07-11 LAB — HBA1C MFR BLD: 5.9 % (ref 4.3–6)

## 2017-07-11 PROCEDURE — 83036 HEMOGLOBIN GLYCOSYLATED A1C: CPT | Performed by: FAMILY MEDICINE

## 2017-07-11 PROCEDURE — 80061 LIPID PANEL: CPT | Performed by: FAMILY MEDICINE

## 2017-07-11 PROCEDURE — 84443 ASSAY THYROID STIM HORMONE: CPT | Performed by: FAMILY MEDICINE

## 2017-07-11 PROCEDURE — 82947 ASSAY GLUCOSE BLOOD QUANT: CPT | Performed by: FAMILY MEDICINE

## 2017-07-11 PROCEDURE — 36415 COLL VENOUS BLD VENIPUNCTURE: CPT | Performed by: FAMILY MEDICINE

## 2017-07-11 PROCEDURE — 86803 HEPATITIS C AB TEST: CPT | Performed by: FAMILY MEDICINE

## 2017-07-11 PROCEDURE — 82043 UR ALBUMIN QUANTITATIVE: CPT | Performed by: FAMILY MEDICINE

## 2017-07-11 PROCEDURE — 99214 OFFICE O/P EST MOD 30 MIN: CPT | Performed by: FAMILY MEDICINE

## 2017-07-11 ASSESSMENT — ANXIETY QUESTIONNAIRES
7. FEELING AFRAID AS IF SOMETHING AWFUL MIGHT HAPPEN: NOT AT ALL
GAD7 TOTAL SCORE: 3
5. BEING SO RESTLESS THAT IT IS HARD TO SIT STILL: NOT AT ALL
6. BECOMING EASILY ANNOYED OR IRRITABLE: SEVERAL DAYS
1. FEELING NERVOUS, ANXIOUS, OR ON EDGE: SEVERAL DAYS
2. NOT BEING ABLE TO STOP OR CONTROL WORRYING: NOT AT ALL
3. WORRYING TOO MUCH ABOUT DIFFERENT THINGS: SEVERAL DAYS

## 2017-07-11 ASSESSMENT — PATIENT HEALTH QUESTIONNAIRE - PHQ9: 5. POOR APPETITE OR OVEREATING: NOT AT ALL

## 2017-07-11 NOTE — PROGRESS NOTES
SUBJECTIVE:                                                    Teresa Hahn is a 56 year old female who presents to clinic today for the following health issues:      Diabetes Follow-up      Patient is checking blood sugars: rarely.  Results range- within normal range    Diabetic concerns: None     Symptoms of hypoglycemia (low blood sugar): none     Paresthesias (numbness or burning in feet) or sores: No- some tingling      Date of last diabetic eye exam: none      Amount of exercise or physical activity: None    Problems taking medications regularly: No    Medication side effects: none    Diet: regular (no restrictions)          Problem list and histories reviewed & adjusted, as indicated.  Additional history:     See under ROS     Patient Active Problem List   Diagnosis     Acute reaction to stress     Arthropathy     Obesity     GERD (gastroesophageal reflux disease)     Anxiety     Health Care Home     TBI (traumatic brain injury) (H)     ADD (attention deficit disorder)     Major depressive disorder, single episode in full remission (H)     Malignant neoplasm of right female breast, unspecified site of breast     Type 2 diabetes mellitus with hyperglycemia, without long-term current use of insulin (H)       Current Outpatient Prescriptions   Medication Sig Dispense Refill     dexamethasone (DECADRON) 4 MG tablet   0     lidocaine-prilocaine (EMLA) cream   0     prochlorperazine (COMPAZINE) 10 MG tablet   0     blood glucose monitoring (ACCU-CHEK FASTCLIX) lancets Use to test blood sugar 2 times daily or as directed.  Ok to substitute alternative if insurance prefers. 1 Box 1     metFORMIN (GLUCOPHAGE) 1000 MG tablet Take 1 tablet (1,000 mg) by mouth 2 times daily (with meals) 180 tablet 0     blood glucose monitoring (JOLLY MICROLET) lancets Use to test blood sugar 4 times daily or as directed.  Ok to substitute alternative if insurance prefers. 1 Box prn     blood glucose monitoring (JOLLY CONTOUR NEXT) test  "strip Use to test blood sugar 4 times daily or as directed.  Ok to substitute alternative if insurance prefers. 200 strip 1     blood glucose monitoring (ACCU-CHEK SHERYL SMARTVIEW) meter device kit   0     blood glucose monitoring (ACCU-CHEK FASTCLIX) lancets   0     ondansetron (ZOFRAN-ODT) 4 MG ODT tab   0     blood glucose (NO BRAND SPECIFIED) lancets standard Use to test blood sugar 4 times daily or as directed. Anticipate decrease in the future. 1 Box 0     blood glucose monitoring (NO BRAND SPECIFIED) test strip Use to test blood sugars 4 times daily or as directed. Anticipate decreasing in the future. 100 strip prn     aspirin 81 MG chewable tablet Take 1 tablet (81 mg) by mouth daily (Patient not taking: Reported on 7/11/2017) 36 tablet 0     Has stopped asprin until after chemo. Will restart again after chemo is done.  Has one more chemo treatment left.    Reviewed and updated as needed this visit by clinical staff       Reviewed and updated as needed this visit by Provider         ROS:  CONSTITUTIONAL:NEGATIVE for fever, chills. weight loss. Not real sick. She is undergoing chemotherapy  RESP:NEGATIVE for significant cough or SOB  CV: NEGATIVE for chest pain, palpitations or peripheral edema  GI:  does have some diarrhea and cramping; will cycle. May be related to chemo and metformin.  Some pain RUQ. Rib cage. Not more with food.   PSYCHIATRIC: NEGATIVE for changes in mood or affect    She is undergoing chemotherapy for her breast cancer. In general, she is tolerating well.     OBJECTIVE:     /72 (BP Location: Right arm, Patient Position: Chair, Cuff Size: Adult Regular)  Pulse 90  Temp 97.9  F (36.6  C) (Oral)  Resp 16  Ht 5' 0.75\" (1.543 m)  Wt 157 lb 14.4 oz (71.6 kg)  SpO2 97%  BMI 30.08 kg/m2  Body mass index is 30.08 kg/(m^2).  GENERAL APPEARANCE: alert and no distress. She is wearing a hat. She does have some thinning hair from beneath this (she notes she has had patchy hair " "loss).  RESP: lungs clear to auscultation - no rales, rhonchi or wheezes  CV: regular rates and rhythm  MS: extremities normal- no gross deformities noted  PSYCH: mentation appears normal and affect normal/bright    Lab Results   Component Value Date    A1C 5.9 07/11/2017    A1C 9.2 03/17/2017       No results for input(s): CHOL, HDL, LDL, TRIG, CHOLHDLRATIO in the last 34937 hours.        ASSESSMENT/PLAN:     Type 2 diabetes mellitus with hyperglycemia, without long-term current use of insulin (H)  HgbA1C is now in satisfactory control. No change in current medication. She would like to eventually get off metformin; discussed that some folks are on this for prediabetes as well. Will follow.   - Lipid Profile with reflex to direct LDL  - Hemoglobin A1c  - **TSH with free T4 reflex FUTURE anytime  - **Albumin Random Urine Quant FUTURE anytime  - Glucose    Hyperlipidemia LDL goal <100  Getting lab. Discussed usually folks with DM are on a statin. Guidelines suggest not going on this if LDL < 70. Will check.  She is currently going through chemotherapy; would anticipate waiting until after she is done unless the lipids are extremely high.   - Lipid Profile with reflex to direct LDL  - STATIN NOT PRESCRIBED, INTENTIONAL,; Please choose reason not prescribed, below    Malignant neoplasm of upper-inner quadrant of right breast in female, estrogen receptor positive (H)  She is going through Chemotherapy. Tolerating currently.     Need for hepatitis C screening test    - **Hepatitis C Screen Reflex to RNA FUTURE anytime    BMI:   Estimated body mass index is 30.08 kg/(m^2) as calculated from the following:    Height as of this encounter: 5' 0.75\" (1.543 m).    Weight as of this encounter: 157 lb 14.4 oz (71.6 kg).   Weight management plan: Discussed healthy diet and exercise guidelines and patient will follow up in 6 months in clinic to re-evaluate.        Patient Instructions   Let's see you again in 3-6 months. (probably " on the earlier side if your lipids are high).  Keep up the good work.          Yocasta Moreno MD, MD  [facility]

## 2017-07-11 NOTE — LETTER
My Depression Action Plan  Name: Teresa Hahn   Date of Birth 1960  Date: 7/11/2017    My doctor: Yocasta Moreno   My clinic: [facility]  [id number] [place] [address] [phone]          GREEN    ZONE   Good Control    What it looks like:     Things are going generally well. You have normal up s and down s. You may even feel depressed from time to time, but bad moods usually last less than a day.   What you need to do:  1. Continue to care for yourself (see self care plan)  2. Check your depression survival kit and update it as needed  3. Follow your physician s recommendations including any medication.  4. Do not stop taking medication unless you consult with your physician first.           YELLOW         ZONE Getting Worse    What it looks like:     Depression is starting to interfere with your life.     It may be hard to get out of bed; you may be starting to isolate yourself from others.    Symptoms of depression are starting to last most all day and this has happened for several days.     You may have suicidal thoughts but they are not constant.   What you need to do:     1. Call your care team, your response to treatment will improve if you keep your care team informed of your progress. Yellow periods are signs an adjustment may need to be made.     2. Continue your self-care, even if you have to fake it!    3. Talk to someone in your support network    4. Open up your depression survival kit           RED    ZONE Medical Alert - Get Help    What it looks like:     Depression is seriously interfering with your life.     You may experience these or other symptoms: You can t get out of bed most days, can t work or engage in other necessary activities, you have trouble taking care of basic hygiene, or basic responsibilities, thoughts of suicide or death that will not go away, self-injurious behavior.     What you need to do:  1. Call your care team and request  a same-day appointment. If they are not available (weekends or after hours) call your local crisis line, emergency room or 911.      Electronically signed by: Lashawn Rob, July 11, 2017    Depression Self Care Plan / Survival Kit    Self-Care for Depression  Here s the deal. Your body and mind are really not as separate as most people think.  What you do and think affects how you feel and how you feel influences what you do and think. This means if you do things that people who feel good do, it will help you feel better.  Sometimes this is all it takes.  There is also a place for medication and therapy depending on how severe your depression is, so be sure to consult with your medical provider and/ or Behavioral Health Consultant if your symptoms are worsening or not improving.     In order to better manage my stress, I will:    Exercise  Get some form of exercise, every day. This will help reduce pain and release endorphins, the  feel good  chemicals in your brain. This is almost as good as taking antidepressants!  This is not the same as joining a gym and then never going! (they count on that by the way ) It can be as simple as just going for a walk or doing some gardening, anything that will get you moving.      Hygiene   Maintain good hygiene (Get out of bed in the morning, Make your bed, Brush your teeth, Take a shower, and Get dressed like you were going to work, even if you are unemployed).  If your clothes don't fit try to get ones that do.    Diet  I will strive to eat foods that are good for me, drink plenty of water, and avoid excessive sugar, caffeine, alcohol, and other mood-altering substances.  Some foods that are helpful in depression are: complex carbohydrates, B vitamins, flaxseed, fish or fish oil, fresh fruits and vegetables.    Psychotherapy  I agree to participate in Individual Therapy (if recommended).    Medication  If prescribed medications, I agree to take them.  Missing doses can  result in serious side effects.  I understand that drinking alcohol, or other illicit drug use, may cause potential side effects.  I will not stop my medication abruptly without first discussing it with my provider.    Staying Connected With Others  I will stay in touch with my friends, family members, and my primary care provider/team.    Use your imagination  Be creative.  We all have a creative side; it doesn t matter if it s oil painting, sand castles, or mud pies! This will also kick up the endorphins.    Witness Beauty  (AKA stop and smell the roses) Take a look outside, even in mid-winter. Notice colors, textures. Watch the squirrels and birds.     Service to others  Be of service to others.  There is always someone else in need.  By helping others we can  get out of ourselves  and remember the really important things.  This also provides opportunities for practicing all the other parts of the program.    Humor  Laugh and be silly!  Adjust your TV habits for less news and crime-drama and more comedy.    Control your stress  Try breathing deep, massage therapy, biofeedback, and meditation. Find time to relax each day.     My support system    Clinic Contact:  Phone number:    Contact 1:  Phone number:    Contact 2:  Phone number:    Quaker/:  Phone number:    Therapist:  Phone number:    Local crisis center:    Phone number:    Other community support:  Phone number:

## 2017-07-11 NOTE — MR AVS SNAPSHOT
"              After Visit Summary   7/11/2017    Teresa Hahn    MRN: [id number]           Patient Information     Date Of Birth          1960        Visit Information        Provider Department      7/11/2017 9:50 AM Yocasta Moreno MD [facility]        Today's Diagnoses     Type 2 diabetes mellitus with hyperglycemia, without long-term current use of insulin (H)        Need for hepatitis C screening test          Care Instructions    Let's see you again in 3-6 months. (probably on the earlier side if your lipids are high).  Keep up the good work.              Follow-ups after your visit        Who to contact     If you have questions or need follow up information about today's clinic visit or your schedule please contact [facility] directly at [phone].  Normal or non-critical lab and imaging results will be communicated to you by MyChart, letter or phone within 4 business days after the clinic has received the results. If you do not hear from us within 7 days, please contact the clinic through MyChart or phone. If you have a critical or abnormal lab result, we will notify you by phone as soon as possible.  Submit refill requests through [employer] or call your pharmacy and they will forward the refill request to us. Please allow 3 business days for your refill to be completed.          Additional Information About Your Visit        MyChart Information     [facility] lets you send messages to your doctor, view your test results, renew your prescriptions, schedule appointments and more. To sign up, go to [email or web address] . Click on \"Log in\" on the left side of the screen, which will take you to the Welcome page. Then click on \"Sign up Now\" on the right side of the page.     You will be asked to enter the access code listed below, as well as some personal information. Please follow the directions to create your username and password.     Your access code is: " [email or web address]  Expires: 10/9/2017 10:34 AM     Your access code will  in 90 days. If you need help or a new code, please call your [place] clinic or [phone].        Care EveryWhere ID     This is your Care EveryWhere ID. This could be used by other organizations to access your [facility] medical records  [id number]        Your Vitals Were     Pulse Temperature Respirations Height Pulse Oximetry BMI (Body Mass Index)    90 97.9  F (36.6  C) (Oral) 16 5' 0.75\" (1.543 m) 97% 30.08 kg/m2       Blood Pressure from Last 3 Encounters:   17 112/72   17 110/76   17 122/82    Weight from Last 3 Encounters:   17 157 lb 14.4 oz (71.6 kg)   17 165 lb (74.8 kg)   17 166 lb 9.6 oz (75.6 kg)              We Performed the Following     **Albumin Random Urine Quant FUTURE anytime     **Hepatitis C Screen Reflex to RNA FUTURE anytime     **TSH with free T4 reflex FUTURE anytime     DEPRESSION ACTION PLAN (DAP)     Glucose     Hemoglobin A1c     Lipid Profile with reflex to direct LDL        Primary Care Provider Office Phone # Fax #    Yocasta Moreno [phone]       [facility] [address] [facility] [address]        Equal Access to Services     [facility]: Hadii aad ku hadasho Soomaali, waaxda luqadaha, qaybta kaalmada adeegyada, birgit carias haycarlos cyr . So [facility] [phone].    ATENCIÓN: Si habla español, tiene a bejarano disposición servicios gratuitos de asistencia lingüística. Llame al [phone].    We comply with applicable federal civil rights laws and [place] laws. We do not discriminate on the basis of race, color, national origin, age, disability sex, sexual orientation or gender identity.            Thank you!     Thank you for choosing [facility]  for your care. Our goal is always to provide you with excellent care. Hearing back from our patients is one way we can continue to improve our services. Please " take a few minutes to complete the written survey that you may receive in the mail after your visit with us. Thank you!             Your Updated Medication List - Protect others around you: Learn how to safely use, store and throw away your medicines at www.disposemymeds.org.          This list is accurate as of: 7/11/17 10:34 AM.  Always use your most recent med list.                   Brand Name Dispense Instructions for use Diagnosis    aspirin 81 MG chewable tablet     36 tablet    Take 1 tablet (81 mg) by mouth daily    Type 2 diabetes mellitus with hyperglycemia, without long-term current use of insulin (H)       blood glucose lancets standard    no brand specified    1 Box    Use to test blood sugar 4 times daily or as directed. Anticipate decrease in the future.    Type 2 diabetes mellitus with hyperglycemia, without long-term current use of insulin (H)       * blood glucose monitoring lancets           * blood glucose monitoring lancets     1 Box    Use to test blood sugar 4 times daily or as directed.  Ok to substitute alternative if insurance prefers.    Type 2 diabetes mellitus with hyperglycemia, without long-term current use of insulin (H)       * blood glucose monitoring lancets     1 Box    Use to test blood sugar 2 times daily or as directed.  Ok to substitute alternative if insurance prefers.    Type 2 diabetes mellitus with hyperglycemia, without long-term current use of insulin (H)       blood glucose monitoring meter device kit           * blood glucose monitoring test strip    no brand specified    100 strip    Use to test blood sugars 4 times daily or as directed. Anticipate decreasing in the future.    Type 2 diabetes mellitus with hyperglycemia, without long-term current use of insulin (H)       * blood glucose monitoring test strip    JOLLY CONTOUR NEXT    200 strip    Use to test blood sugar 4 times daily or as directed.  Ok to substitute alternative if insurance prefers.    Type 2  diabetes mellitus with hyperglycemia, without long-term current use of insulin (H)       dexamethasone 4 MG tablet    DECADRON          lidocaine-prilocaine cream    EMLA          metFORMIN 1000 MG tablet    GLUCOPHAGE    180 tablet    Take 1 tablet (1,000 mg) by mouth 2 times daily (with meals)    Type 2 diabetes mellitus with hyperglycemia, without long-term current use of insulin (H)       ondansetron 4 MG ODT tab    ZOFRAN-ODT          prochlorperazine 10 MG tablet    COMPAZINE          * Notice:  This list has 5 medication(s) that are the same as other medications prescribed for you. Read the directions carefully, and ask your doctor or other care provider to review them with you.

## 2017-07-11 NOTE — PATIENT INSTRUCTIONS
Let's see you again in 3-6 months. (probably on the earlier side if your lipids are high).  Keep up the good work.

## 2017-07-11 NOTE — NURSING NOTE
"Chief Complaint   Patient presents with     Diabetes       Initial /72 (BP Location: Right arm, Patient Position: Chair, Cuff Size: Adult Regular)  Pulse 90  Temp 97.9  F (36.6  C) (Oral)  Resp 16  Ht 5' 0.75\" (1.543 m)  Wt 157 lb 14.4 oz (71.6 kg)  SpO2 97%  BMI 30.08 kg/m2 Estimated body mass index is 30.08 kg/(m^2) as calculated from the following:    Height as of this encounter: 5' 0.75\" (1.543 m).    Weight as of this encounter: 157 lb 14.4 oz (71.6 kg).  Medication Reconciliation: complete   Lashawn Rob, VERA      "

## 2017-07-11 NOTE — LETTER
July 17, 2017      Teresa Hahn  [address]  [facility] [address]        Dear Ms. Teresa CASTILLO [place],    Enclosed is a copy of your recent results.    The Hgb A1C is a reflection of your diabetes control over the last 3 months.  We do like to see this under 7.0.  We talked about this in clinic.    The urine albumin to creatinine ratio serves as a marker of higher risk of cardiovascular and kidney disease.  If this is elevated, we typically like to see you on either an ACE inhibitor, or an ARB.   We are also getting more aggressive with both blood pressure and cholesterol goals.   (The number we are looking at is the ratio; this usually appears at the bottom of the report with a normal range 0-25).  This is normal.    You do fit the guidelines recommending to be on a statin medication. However, I think it is OK to wait until after you are done with the chemo. We can discuss in more detail then.    I hope the rest of your course goes very smoothly!    Sincerely,     Yocasta Moreno MD

## 2017-07-12 LAB — HCV AB SERPL QL IA: NORMAL

## 2017-07-12 ASSESSMENT — ANXIETY QUESTIONNAIRES: GAD7 TOTAL SCORE: 3

## 2017-07-12 ASSESSMENT — PATIENT HEALTH QUESTIONNAIRE - PHQ9: SUM OF ALL RESPONSES TO PHQ QUESTIONS 1-9: 0

## 2017-07-13 LAB
CHOLEST SERPL-MCNC: 157 MG/DL
CREAT UR-MCNC: 169 MG/DL
GLUCOSE SERPL-MCNC: 116 MG/DL (ref 70–99)
HDLC SERPL-MCNC: 30 MG/DL
LDLC SERPL CALC-MCNC: 80 MG/DL
MICROALBUMIN UR-MCNC: 11 MG/L
MICROALBUMIN/CREAT UR: 6.75 MG/G CR (ref 0–25)
NONHDLC SERPL-MCNC: 127 MG/DL
TRIGL SERPL-MCNC: 237 MG/DL
TSH SERPL DL<=0.005 MIU/L-ACNC: 2.03 MU/L (ref 0.4–4)

## 2017-07-14 ENCOUNTER — TELEPHONE (OUTPATIENT)
Dept: FAMILY MEDICINE | Facility: CLINIC | Age: 57
End: 2017-07-14

## 2017-07-14 DIAGNOSIS — E11.65 TYPE 2 DIABETES MELLITUS WITH HYPERGLYCEMIA, WITHOUT LONG-TERM CURRENT USE OF INSULIN (H): ICD-10-CM

## 2017-07-14 NOTE — TELEPHONE ENCOUNTER
Patient was here earlier this week, and needed a refill of her Metformin sent.  She checked with her pharmacy, and they did not receive it.  This is sent for 6 months.   Prefers 90 days at a time for insurance purposes,  Confirmed dosing and sent this today.  Tracie Quarles, RN  Triage Nurse

## 2017-07-17 ENCOUNTER — TRANSFERRED RECORDS (OUTPATIENT)
Dept: HEALTH INFORMATION MANAGEMENT | Facility: CLINIC | Age: 57
End: 2017-07-17

## 2017-07-17 PROBLEM — E78.5 HYPERLIPIDEMIA LDL GOAL <100: Status: ACTIVE | Noted: 2017-07-17

## 2017-07-17 PROBLEM — E78.5 HYPERLIPIDEMIA LDL GOAL <100: Status: ACTIVE | Noted: 2017-07-11

## 2017-08-18 ENCOUNTER — CARE COORDINATION (OUTPATIENT)
Dept: CARE COORDINATION | Facility: CLINIC | Age: 57
End: 2017-08-18

## 2017-08-18 NOTE — PROGRESS NOTES
Clinic Care Coordination Contact  [facility]/Voicemail    Please see the following entries -   07/11/2017 - OV notes by PCP   07/18/2017 - OV notes by Dr. Ochoa with [facility] - Transferred records entry.     Referral Source: PCP  Clinical Data: Care Coordinator Outreach  Outreach attempted x 1.  Patient answered but reports that she is unable to speak with writer at this time.   Plan: Care Coordinator mailed out care coordination introduction letter on 02/17/2017. Care Coordinator will try to reach patient again in 3-5 business days.    Jeniffer Veliz, RN  [facility]  Clinic Care Coordinator - [place] and [place] Locations   Direct:  [phone] (voicemail available)

## 2017-10-09 ENCOUNTER — TRANSFERRED RECORDS (OUTPATIENT)
Dept: HEALTH INFORMATION MANAGEMENT | Facility: CLINIC | Age: 57
End: 2017-10-09

## 2017-10-17 ENCOUNTER — CARE COORDINATION (OUTPATIENT)
Dept: CARE COORDINATION | Facility: CLINIC | Age: 57
End: 2017-10-17

## 2017-10-17 NOTE — LETTER
[facility] CARE COORDINATION  [address] [address]  Phone: [phone]      October 17, 2017      Teresa Hahn  [address]  [place] [address]    Dear Teresa,  I am the Clinic Care Coordinator that works with your primary care provider's clinic. I wanted to follow up with you to see if you felt like you would benefit from  ongoing Care Coordination services.  Below is a description of what Clinic Care Coordination is and how I can further assist you.     The Clinic Care Coordinator role is a Registered Nurse and/or  who understands the health care system. The goal of Clinic Care Coordination is to help you manage your health and improve access to the [facility] system in the most efficient manner.  The Registered Nurse can assist you in meeting your health care goals by providing education, coordinating services, and strengthening the communication among your providers. The  can assist you with financial, behavioral, psychosocial, and chemical dependency and counseling/psychiatric resources.    Please feel free to keep this letter and contact information to contact me at [phone] with any further questions or concerns that may arise. We at [facility] are focused on providing you with the highest-quality healthcare experience possible and that all starts with you.       Sincerely,     Jeniffer Lopezs    Enclosed: I have enclosed a copy of a 24 Hour Access Plan. This has helpful phone numbers for you to call when needed. Please keep this in an easy to access place to use as needed.      24 Hour Access Plan    Presenting Problem Signs and Symptoms Treatment Plan    Questions or concerns during clinic hours    I will call the clinic directly     Questions or concerns outside clinic hours    I will call the 24 hour nurse line at [phone]    Patient needs to schedule an appointment    I will call the 24 hour scheduling team at [phone] or clinic  directly    Same day treatment     I will call the clinic first, nurse line if after hours, urgent care and express care if needed                          Clinic Care Coordinator: Jeniffer Veliz, RN Care Coordinator [phone]

## 2017-10-17 NOTE — PROGRESS NOTES
Clinic Care Coordination Contact  [facility]/Voicemail    Referral Source: PCP  Clinical Data: Care Coordinator Outreach  Chart review performed.    Please refer to 06/30/2017 CCRN and CCSW entries.   Patient working with her Oncology team.   Plan: Care Coordinator will mail out care coordination follow up letter with care coordinator contact information and explanation of care coordination services. Care Coordinator will do no further outreaches at this time.  Placing on INACTIVE status at this time.     Jeniffer Veliz, RN  [facility] Care Coordinator - [place] and [place] Locations   Direct:  [phone] (voicemail available)

## 2017-11-07 ENCOUNTER — TELEPHONE (OUTPATIENT)
Dept: PHARMACY | Facility: CLINIC | Age: 57
End: 2017-11-07

## 2017-11-07 DIAGNOSIS — E78.5 HYPERLIPIDEMIA LDL GOAL <100: ICD-10-CM

## 2017-11-07 DIAGNOSIS — E11.65 TYPE 2 DIABETES MELLITUS WITH HYPERGLYCEMIA, WITHOUT LONG-TERM CURRENT USE OF INSULIN (H): Primary | ICD-10-CM

## 2017-11-07 NOTE — TELEPHONE ENCOUNTER
Please have patient make lab appointment for fasting labs and then see Dr. Moreno 2-3 days after.  Thanks

## 2017-11-28 ENCOUNTER — TRANSFERRED RECORDS (OUTPATIENT)
Dept: HEALTH INFORMATION MANAGEMENT | Facility: CLINIC | Age: 57
End: 2017-11-28

## 2017-11-28 DIAGNOSIS — E11.65 TYPE 2 DIABETES MELLITUS WITH HYPERGLYCEMIA, WITHOUT LONG-TERM CURRENT USE OF INSULIN (H): ICD-10-CM

## 2017-11-28 DIAGNOSIS — E78.5 HYPERLIPIDEMIA LDL GOAL <100: ICD-10-CM

## 2017-11-28 LAB — HBA1C MFR BLD: 6.2 % (ref 4.3–6)

## 2017-11-28 PROCEDURE — 83036 HEMOGLOBIN GLYCOSYLATED A1C: CPT | Performed by: FAMILY MEDICINE

## 2017-11-28 PROCEDURE — 80061 LIPID PANEL: CPT | Performed by: FAMILY MEDICINE

## 2017-11-28 PROCEDURE — 36415 COLL VENOUS BLD VENIPUNCTURE: CPT | Performed by: FAMILY MEDICINE

## 2017-11-29 LAB
CHOLEST SERPL-MCNC: 187 MG/DL
HDLC SERPL-MCNC: 45 MG/DL
LDLC SERPL CALC-MCNC: 112 MG/DL
NONHDLC SERPL-MCNC: 142 MG/DL
TRIGL SERPL-MCNC: 152 MG/DL

## 2017-11-30 ENCOUNTER — TRANSFERRED RECORDS (OUTPATIENT)
Dept: HEALTH INFORMATION MANAGEMENT | Facility: CLINIC | Age: 57
End: 2017-11-30

## 2017-12-05 ENCOUNTER — TRANSFERRED RECORDS (OUTPATIENT)
Dept: HEALTH INFORMATION MANAGEMENT | Facility: CLINIC | Age: 57
End: 2017-12-05

## 2017-12-07 ENCOUNTER — OFFICE VISIT (OUTPATIENT)
Dept: FAMILY MEDICINE | Facility: CLINIC | Age: 57
End: 2017-12-07
Payer: COMMERCIAL

## 2017-12-07 VITALS
OXYGEN SATURATION: 96 % | BODY MASS INDEX: 29.23 KG/M2 | SYSTOLIC BLOOD PRESSURE: 118 MMHG | HEART RATE: 82 BPM | TEMPERATURE: 98.2 F | WEIGHT: 154.8 LBS | DIASTOLIC BLOOD PRESSURE: 80 MMHG | HEIGHT: 61 IN

## 2017-12-07 DIAGNOSIS — Z82.49 FAMILY HISTORY OF ISCHEMIC HEART DISEASE: ICD-10-CM

## 2017-12-07 DIAGNOSIS — E11.65 TYPE 2 DIABETES MELLITUS WITH HYPERGLYCEMIA, WITHOUT LONG-TERM CURRENT USE OF INSULIN (H): ICD-10-CM

## 2017-12-07 DIAGNOSIS — C50.211 MALIGNANT NEOPLASM OF UPPER-INNER QUADRANT OF RIGHT BREAST IN FEMALE, ESTROGEN RECEPTOR POSITIVE (H): Primary | ICD-10-CM

## 2017-12-07 DIAGNOSIS — Z78.0 MENOPAUSE: ICD-10-CM

## 2017-12-07 DIAGNOSIS — E78.5 HYPERLIPIDEMIA LDL GOAL <100: ICD-10-CM

## 2017-12-07 DIAGNOSIS — Z17.0 MALIGNANT NEOPLASM OF UPPER-INNER QUADRANT OF RIGHT BREAST IN FEMALE, ESTROGEN RECEPTOR POSITIVE (H): Primary | ICD-10-CM

## 2017-12-07 PROCEDURE — 99214 OFFICE O/P EST MOD 30 MIN: CPT | Performed by: FAMILY MEDICINE

## 2017-12-07 NOTE — NURSING NOTE
"Chief Complaint   Patient presents with     Diabetes       Initial /80 (BP Location: Left arm, Cuff Size: Adult Regular)  Pulse 82  Temp 98.2  F (36.8  C) (Oral)  Ht 5' 0.75\" (1.543 m)  Wt 154 lb 12.8 oz (70.2 kg)  LMP 11/18/2013  SpO2 96%  BMI 29.49 kg/m2 Estimated body mass index is 29.49 kg/(m^2) as calculated from the following:    Height as of this encounter: 5' 0.75\" (1.543 m).    Weight as of this encounter: 154 lb 12.8 oz (70.2 kg).  Medication Reconciliation: complete   Estrellita Kiser, VERA    "

## 2017-12-07 NOTE — MR AVS SNAPSHOT
"              After Visit Summary   12/7/2017    Teresa aHhn    MRN: [id number]           Patient Information     Date Of Birth          1960        Visit Information        Provider Department      12/7/2017 4:10 PM Yocasta Moreno MD [facility]        Today's Diagnoses     Menopause    -  1    Malignant neoplasm of upper-inner quadrant of right breast in female, estrogen receptor positive (H)          Care Instructions        If you do not hear from [facility], please call them to schedule your DXA.               Follow-ups after your visit        Future tests that were ordered for you today     Open Future Orders        Priority Expected Expires Ordered    DX Hip/Pelvis/Spine Routine  12/7/2018 12/7/2017            Who to contact     If you have questions or need follow up information about today's clinic visit or your schedule please contact [facility] directly at [phone].  Normal or non-critical lab and imaging results will be communicated to you by MyChart, letter or phone within 4 business days after the clinic has received the results. If you do not hear from us within 7 days, please contact the clinic through MyChart or phone. If you have a critical or abnormal lab result, we will notify you by phone as soon as possible.  Submit refill requests through [facility] or call your pharmacy and they will forward the refill request to us. Please allow 3 business days for your refill to be completed.          Additional Information About Your Visit        MyChart Information     [facility] lets you send messages to your doctor, view your test results, renew your prescriptions, schedule appointments and more. To sign up, go to [email or web address] . Click on \"Log in\" on the left side of the screen, which will take you to the Welcome page. Then click on \"Sign up Now\" on the right side of the page.     You will be asked to enter the access code listed below, as well " "as some personal information. Please follow the directions to create your username and password.     Your access code is: [email or web address]  Expires: 3/7/2018  5:13 PM     Your access code will  in 90 days. If you need help or a new code, please call your [place] clinic or [phone].        Care EveryWhere ID     This is your [facility] EveryWhere ID. This could be used by other organizations to access your [place] medical records  [id number]        Your Vitals Were     Pulse Temperature Height Last Period Pulse Oximetry BMI (Body Mass Index)    82 98.2  F (36.8  C) (Oral) 5' 0.75\" (1.543 m) 2013 96% 29.49 kg/m2       Blood Pressure from Last 3 Encounters:   17 118/80   17 112/72   17 110/76    Weight from Last 3 Encounters:   17 154 lb 12.8 oz (70.2 kg)   17 157 lb 14.4 oz (71.6 kg)   17 165 lb (74.8 kg)                 Today's Medication Changes          These changes are accurate as of: 17  5:13 PM.  If you have any questions, ask your nurse or doctor.               These medicines have changed or have updated prescriptions.        Dose/Directions    blood glucose monitoring lancets   This may have changed:  Another medication with the same name was removed. Continue taking this medication, and follow the directions you see here.   Used for:  Type 2 diabetes mellitus with hyperglycemia, without long-term current use of insulin (H)        Use to test blood sugar 2 times daily or as directed.  Ok to substitute alternative if insurance prefers.   Quantity:  1 Box   Refills:  1         Stop taking these medicines if you haven't already. Please contact your care team if you have questions.     dexamethasone 4 MG tablet   Commonly known as:  DECADRON   Stopped by:  Yocasta Moreno MD           lidocaine-prilocaine cream   Commonly known as:  EMLA   Stopped by:  Yocasta Moreno MD           ondansetron 4 MG ODT tab   Commonly known as:  ZOFRAN-ODT   Stopped by:  Yocasta Moreno" MD EDMUND           prochlorperazine 10 MG tablet   Commonly known as:  COMPAZINE   Stopped by:  Yocasta Moreno MD                    Primary Care Provider Office Phone # Fax #    Yocasta Moreno [phone] [id number]  [facility] [id number]        Equal Access to Services     [facility]: Hadii aad ku hadasho Soomaali, waaxda luqadaha, qaybta kaalmada adeegyada, waxay idiin hayaan adeeg khnadeemsh la'meghannn . So [facility] [phone].    ATENCIÓN: Si habla español, tiene a bejarano disposición servicios gratuitos de asistencia lingüística. Llame al [phone].    We comply with applicable federal civil rights laws and [place] laws. We do not discriminate on the basis of race, color, national origin, age, disability, sex, sexual orientation, or gender identity.            Thank you!     Thank you for choosing [facility]  for your care. Our goal is always to provide you with excellent care. Hearing back from our patients is one way we can continue to improve our services. Please take a few minutes to complete the written survey that you may receive in the mail after your visit with us. Thank you!             Your Updated Medication List - Protect others around you: Learn how to safely use, store and throw away your medicines at www.disposemymeds.org.          This list is accurate as of: 12/7/17  5:13 PM.  Always use your most recent med list.                   Brand Name Dispense Instructions for use Diagnosis    aspirin 81 MG chewable tablet     36 tablet    Take 1 tablet (81 mg) by mouth daily    Type 2 diabetes mellitus with hyperglycemia, without long-term current use of insulin (H)       blood glucose lancets standard    no brand specified    1 Box    Use to test blood sugar 4 times daily or as directed. Anticipate decrease in the future.    Type 2 diabetes mellitus with hyperglycemia, without long-term current use of insulin (H)       blood glucose monitoring lancets     1 Box     Use to test blood sugar 2 times daily or as directed.  Ok to substitute alternative if insurance prefers.    Type 2 diabetes mellitus with hyperglycemia, without long-term current use of insulin (H)       blood glucose monitoring meter device kit           * blood glucose monitoring test strip    no brand specified    100 strip    Use to test blood sugars 4 times daily or as directed. Anticipate decreasing in the future.    Type 2 diabetes mellitus with hyperglycemia, without long-term current use of insulin (H)       * blood glucose monitoring test strip    JOLLY CONTOUR NEXT    200 strip    Use to test blood sugar 4 times daily or as directed.  Ok to substitute alternative if insurance prefers.    Type 2 diabetes mellitus with hyperglycemia, without long-term current use of insulin (H)       metFORMIN 1000 MG tablet    GLUCOPHAGE    180 tablet    Take 1 tablet (1,000 mg) by mouth 2 times daily (with meals)    Type 2 diabetes mellitus with hyperglycemia, without long-term current use of insulin (H)       STATIN NOT PRESCRIBED (INTENTIONAL)      Please choose reason not prescribed, below    Hyperlipidemia LDL goal <100       * Notice:  This list has 2 medication(s) that are the same as other medications prescribed for you. Read the directions carefully, and ask your doctor or other care provider to review them with you.

## 2017-12-07 NOTE — PROGRESS NOTES
"  SUBJECTIVE:   Teresa Hahn is a 57 year old female who presents to clinic today for the following health issues:    Patient here today to discuss family history of heart problems.  Wants DEXA scan.  AND  Declines flu vaccine today.  Diabetes Follow-up      Patient is checking blood sugars: not at all    Diabetic concerns: None     Symptoms of hypoglycemia (low blood sugar): none     Paresthesias (numbness or burning in feet) or sores: No     Date of last diabetic eye exam: DUE!    BP Readings from Last 2 Encounters:   12/07/17 118/80   07/11/17 112/72     Hemoglobin A1C (%)   Date Value   11/28/2017 6.2 (H)   07/11/2017 5.9     LDL Cholesterol Calculated (mg/dL)   Date Value   11/28/2017 112 (H)   07/11/2017 80       Amount of exercise or physical activity: states only \"normal activity\"    Problems taking medications regularly: No    Medication side effects: none    Diet: carbohydrate counting        Problem list and histories reviewed & adjusted, as indicated.  Additional history:     See under ROS     Patient Active Problem List   Diagnosis     Acute reaction to stress     Arthropathy     Obesity     GERD (gastroesophageal reflux disease)     Anxiety     Health Care Home     TBI (traumatic brain injury) (H)     ADD (attention deficit disorder)     Major depressive disorder, single episode in full remission (H)     Malignant neoplasm of right female breast (H)     Type 2 diabetes mellitus with hyperglycemia, without long-term current use of insulin (H)     Hyperlipidemia LDL goal <100       Current Outpatient Prescriptions   Medication Sig Dispense Refill     metFORMIN (GLUCOPHAGE) 1000 MG tablet Take 1 tablet (1,000 mg) by mouth 2 times daily (with meals) 180 tablet 1     STATIN NOT PRESCRIBED, INTENTIONAL, Please choose reason not prescribed, below       dexamethasone (DECADRON) 4 MG tablet   0     lidocaine-prilocaine (EMLA) cream   0     prochlorperazine (COMPAZINE) 10 MG tablet   0     blood glucose " monitoring (ACCU-CHEK FASTCLIX) lancets Use to test blood sugar 2 times daily or as directed.  Ok to substitute alternative if insurance prefers. (Patient not taking: Reported on 12/7/2017) 1 Box 1     aspirin 81 MG chewable tablet Take 1 tablet (81 mg) by mouth daily (Patient not taking: Reported on 7/11/2017) 36 tablet 0     blood glucose monitoring (JOLLY MICROLET) lancets Use to test blood sugar 4 times daily or as directed.  Ok to substitute alternative if insurance prefers. (Patient not taking: Reported on 12/7/2017) 1 Box prn     blood glucose monitoring (JOLLY CONTOUR NEXT) test strip Use to test blood sugar 4 times daily or as directed.  Ok to substitute alternative if insurance prefers. (Patient not taking: Reported on 12/7/2017) 200 strip 1     blood glucose monitoring (ACCU-CHEK SHERYL SMARTVIEW) meter device kit   0     blood glucose monitoring (ACCU-CHEK FASTCLIX) lancets   0     ondansetron (ZOFRAN-ODT) 4 MG ODT tab   0     blood glucose (NO BRAND SPECIFIED) lancets standard Use to test blood sugar 4 times daily or as directed. Anticipate decrease in the future. (Patient not taking: Reported on 12/7/2017) 1 Box 0     blood glucose monitoring (NO BRAND SPECIFIED) test strip Use to test blood sugars 4 times daily or as directed. Anticipate decreasing in the future. (Patient not taking: Reported on 12/7/2017) 100 strip prn         Reviewed and updated as needed this visit by clinical staffTobacco  Allergies  Meds  Med Hx  Surg Hx  Fam Hx  Soc Hx      Reviewed and updated as needed this visit by Provider         ROS:  CONSTITUTIONAL:NEGATIVE for fever, chills, change in weight  RESP:NEGATIVE for significant cough or SOB  CV: NEGATIVE for chest pain, palpitations or peripheral edema  MUSCULOSKELETAL: NEGATIVE for significant arthralgias or myalgia  PSYCHIATRIC: NEGATIVE for changes in mood or affect    Oncology is recommending an aromatase inhibitor.   Would like a DXA scan. ISABEL.    Works nights.  "Does not take metformin the nights she works. Does not take some mornings too due to this.  Takes at least 1 pill daily.   Diarrhea.     Mother dx with Multiple Myeloma. She is caring for her.     Brother (has 7) had MI  Thinking holistic. Health and nutrition.  Would like to work on her overall health.     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    OBJECTIVE:     /80 (BP Location: Left arm, Cuff Size: Adult Regular)  Pulse 82  Temp 98.2  F (36.8  C) (Oral)  Ht 5' 0.75\" (1.543 m)  Wt 154 lb 12.8 oz (70.2 kg)  LMP 11/18/2013  SpO2 96%  BMI 29.49 kg/m2  Body mass index is 29.49 kg/(m^2).  GENERAL APPEARANCE: alert and no distress  RESP: lungs clear to auscultation - no rales, rhonchi or wheezes  CV: regular rates and rhythm  MS: no edema.   PSYCH: mentation appears normal and affect normal/bright    The 10-year ASCVD risk score (Tiffanie DIOR Jr, et al., 2013) is: 4.4%    Values used to calculate the score:      Age: 57 years      Sex: Female      Is Non- : No      Diabetic: Yes      Tobacco smoker: No      Systolic Blood Pressure: 118 mmHg      Is BP treated: No      HDL Cholesterol: 45 mg/dL      Total Cholesterol: 187 mg/dL      Lab Results   Component Value Date    A1C 6.2 11/28/2017    A1C 5.9 07/11/2017    A1C 9.2 03/17/2017       Recent Labs   Lab Test  11/28/17   0904  07/11/17   0940   CHOL  187  157   HDL  45*  30*   LDL  112*  80   TRIG  152*  237*           ASSESSMENT/PLAN:     Malignant neoplasm of upper-inner quadrant of right breast in female, estrogen receptor positive (H)  Considering aromatase inhibitor. Needing DXA; will need to forward to Oncologist.   - DX Hip/Pelvis/Spine; Future    Type 2 diabetes mellitus with hyperglycemia, without long-term current use of insulin (H)  Recommend to go back to asprin.   Discussed statin. She is motivated to work on diet and exercise.  Discussed metformin; she is OK to continue doing what she is doing. Her HgbA1C is satisfactory; " will continue to follow.     Hyperlipidemia LDL goal <100  Discussed statin in detail. I do recommend going on this. She is currently deferring. Would like to recheck fasting lipids in future.  Her LDL was getting close to the 70 where statin may not be needed in the summer, but up now. I suspect this lower number was due to her nutrition around her cancer treatment and the higher number will be more her norm.     Family history of ischemic heart disease  As above. At this time, encourage statin. Discussed the increased risk in diabetics.   She declines and is going to work aggressively on diet and exercise. Encouraged her to do this as well.    Menopause    - DX Hip/Pelvis/Spine; Future        Patient Instructions       If you do not hear from [facility], please call them to schedule your DXA.           Yocasta Moreno MD, MD  [facility]

## 2017-12-15 ENCOUNTER — RADIANT APPOINTMENT (OUTPATIENT)
Dept: BONE DENSITY | Facility: CLINIC | Age: 57
End: 2017-12-15
Attending: FAMILY MEDICINE
Payer: COMMERCIAL

## 2017-12-15 DIAGNOSIS — Z17.0 MALIGNANT NEOPLASM OF UPPER-INNER QUADRANT OF RIGHT BREAST IN FEMALE, ESTROGEN RECEPTOR POSITIVE (H): ICD-10-CM

## 2017-12-15 DIAGNOSIS — Z78.0 MENOPAUSE: ICD-10-CM

## 2017-12-15 DIAGNOSIS — C50.211 MALIGNANT NEOPLASM OF UPPER-INNER QUADRANT OF RIGHT BREAST IN FEMALE, ESTROGEN RECEPTOR POSITIVE (H): ICD-10-CM

## 2017-12-15 PROCEDURE — 77080 DXA BONE DENSITY AXIAL: CPT | Performed by: INTERNAL MEDICINE

## 2017-12-18 ENCOUNTER — OFFICE VISIT (OUTPATIENT)
Dept: FAMILY MEDICINE | Facility: CLINIC | Age: 57
End: 2017-12-18
Payer: COMMERCIAL

## 2017-12-18 VITALS
TEMPERATURE: 97.9 F | SYSTOLIC BLOOD PRESSURE: 108 MMHG | BODY MASS INDEX: 29.43 KG/M2 | HEART RATE: 90 BPM | DIASTOLIC BLOOD PRESSURE: 68 MMHG | OXYGEN SATURATION: 96 % | WEIGHT: 154.5 LBS

## 2017-12-18 DIAGNOSIS — Z17.0 MALIGNANT NEOPLASM OF UPPER-INNER QUADRANT OF RIGHT BREAST IN FEMALE, ESTROGEN RECEPTOR POSITIVE (H): ICD-10-CM

## 2017-12-18 DIAGNOSIS — Z01.818 PREOP GENERAL PHYSICAL EXAM: Primary | ICD-10-CM

## 2017-12-18 DIAGNOSIS — C50.211 MALIGNANT NEOPLASM OF UPPER-INNER QUADRANT OF RIGHT BREAST IN FEMALE, ESTROGEN RECEPTOR POSITIVE (H): ICD-10-CM

## 2017-12-18 LAB — HGB BLD-MCNC: 14.1 G/DL (ref 11.7–15.7)

## 2017-12-18 PROCEDURE — 99214 OFFICE O/P EST MOD 30 MIN: CPT | Performed by: NURSE PRACTITIONER

## 2017-12-18 PROCEDURE — 85018 HEMOGLOBIN: CPT | Performed by: NURSE PRACTITIONER

## 2017-12-18 PROCEDURE — 80053 COMPREHEN METABOLIC PANEL: CPT | Performed by: NURSE PRACTITIONER

## 2017-12-18 PROCEDURE — 36415 COLL VENOUS BLD VENIPUNCTURE: CPT | Performed by: NURSE PRACTITIONER

## 2017-12-18 PROCEDURE — 93000 ELECTROCARDIOGRAM COMPLETE: CPT | Performed by: NURSE PRACTITIONER

## 2017-12-18 NOTE — MR AVS SNAPSHOT
After Visit Summary   12/18/2017    Teresa Hahn    MRN: [id number]           Patient Information     Date Of Birth          1960        Visit Information        Provider Department      12/18/2017 1:20 PM Celeste Valenzuela Ra, APRN CNP [facility]        Today's Diagnoses     Preop general physical exam    -  1      Care Instructions      Before Your Surgery      Call your surgeon if there is any change in your health. This includes signs of a cold or flu (such as a sore throat, runny nose, cough, rash or fever).    Do not smoke, drink alcohol or take over the counter medicine (unless your surgeon or primary care doctor tells you to) for the 24 hours before and after surgery.    If you take prescribed drugs: Follow your doctor s orders about which medicines to take and which to stop until after surgery.    Eating and drinking prior to surgery: follow the instructions from your surgeon    Take a shower or bath the night before surgery. Use the soap your surgeon gave you to gently clean your skin. If you do not have soap from your surgeon, use your regular soap. Do not shave or scrub the surgery site.  Wear clean pajamas and have clean sheets on your bed.     Do not take your metformin the night before or the morning of your procedure.    Continue to hold your aspirin.            Follow-ups after your visit        Your next 10 appointments already scheduled     Dec 28, 2017   Procedure with Charan Lane MD   [facility] (--)    [address]  [facility] [id number] [phone]              Who to contact     If you have questions or need follow up information about today's clinic visit or your schedule please contact [facility] directly at [phone].  Normal or non-critical lab and imaging results will be communicated to you by MyChart, letter or phone within 4 business days after the clinic has received the results. If  "you do not hear from us within 7 days, please contact the clinic through [employer] or phone. If you have a critical or abnormal lab result, we will notify you by phone as soon as possible.  Submit refill requests through [employer] or call your pharmacy and they will forward the refill request to us. Please allow 3 business days for your refill to be completed.          Additional Information About Your Visit        [place] Information     [employer] lets you send messages to your doctor, view your test results, renew your prescriptions, schedule appointments and more. To sign up, go to [email or web address] . Click on \"Log in\" on the left side of the screen, which will take you to the Welcome page. Then click on \"Sign up Now\" on the right side of the page.     You will be asked to enter the access code listed below, as well as some personal information. Please follow the directions to create your username and password.     Your access code is: [email or web address]  Expires: 3/7/2018  5:13 PM     Your access code will  in 90 days. If you need help or a new code, please call your [place] clinic or [phone].        Care EveryWhere ID     This is your Care EveryWhere ID. This could be used by other organizations to access your [place] medical records  [id number]        Your Vitals Were     Pulse Temperature Last Period Pulse Oximetry BMI (Body Mass Index)       90 97.9  F (36.6  C) (Oral) 2013 96% 29.43 kg/m2        Blood Pressure from Last 3 Encounters:   17 108/68   17 118/80   17 112/72    Weight from Last 3 Encounters:   17 154 lb 8 oz (70.1 kg)   17 154 lb 12.8 oz (70.2 kg)   17 157 lb 14.4 oz (71.6 kg)              Today, you had the following     No orders found for display       Primary Care Provider Office Phone # Fax #    Yocasta Moreno [phone]       [id number]  [facility] [address]        Equal Access to Services     KEENAN DAVIS AH: Hadii aad " leatha Mancera, wafrancoisda luqadaha, qaybta kamaral mariposasonia, birgit barrettin hayaatigre mongeketty heavenfrandy laellietigre kris. So [facility] [phone].    ATENCIÓN: Si habla español, tiene a bejarano disposición servicios gratuitos de asistencia lingüística. Noemí al [phone].    We comply with applicable federal civil rights laws and [place] laws. We do not discriminate on the basis of race, color, national origin, age, disability, sex, sexual orientation, or gender identity.            Thank you!     Thank you for choosing [facility]  for your care. Our goal is always to provide you with excellent care. Hearing back from our patients is one way we can continue to improve our services. Please take a few minutes to complete the written survey that you may receive in the mail after your visit with us. Thank you!             Your Updated Medication List - Protect others around you: Learn how to safely use, store and throw away your medicines at www.disposemymeds.org.          This list is accurate as of: 12/18/17  1:57 PM.  Always use your most recent med list.                   Brand Name Dispense Instructions for use Diagnosis    aspirin 81 MG chewable tablet     36 tablet    Take 1 tablet (81 mg) by mouth daily    Type 2 diabetes mellitus with hyperglycemia, without long-term current use of insulin (H)       blood glucose lancets standard    no brand specified    1 Box    Use to test blood sugar 4 times daily or as directed. Anticipate decrease in the future.    Type 2 diabetes mellitus with hyperglycemia, without long-term current use of insulin (H)       blood glucose monitoring lancets     1 Box    Use to test blood sugar 2 times daily or as directed.  Ok to substitute alternative if insurance prefers.    Type 2 diabetes mellitus with hyperglycemia, without long-term current use of insulin (H)       blood glucose monitoring meter device kit           * blood glucose monitoring test strip    no brand specified    100  strip    Use to test blood sugars 4 times daily or as directed. Anticipate decreasing in the future.    Type 2 diabetes mellitus with hyperglycemia, without long-term current use of insulin (H)       * blood glucose monitoring test strip    JOLLY CONTOUR NEXT    200 strip    Use to test blood sugar 4 times daily or as directed.  Ok to substitute alternative if insurance prefers.    Type 2 diabetes mellitus with hyperglycemia, without long-term current use of insulin (H)       metFORMIN 1000 MG tablet    GLUCOPHAGE    180 tablet    Take 1 tablet (1,000 mg) by mouth 2 times daily (with meals)    Type 2 diabetes mellitus with hyperglycemia, without long-term current use of insulin (H)       STATIN NOT PRESCRIBED (INTENTIONAL)      Please choose reason not prescribed, below    Hyperlipidemia LDL goal <100       * Notice:  This list has 2 medication(s) that are the same as other medications prescribed for you. Read the directions carefully, and ask your doctor or other care provider to review them with you.

## 2017-12-18 NOTE — PATIENT INSTRUCTIONS
Before Your Surgery      Call your surgeon if there is any change in your health. This includes signs of a cold or flu (such as a sore throat, runny nose, cough, rash or fever).    Do not smoke, drink alcohol or take over the counter medicine (unless your surgeon or primary care doctor tells you to) for the 24 hours before and after surgery.    If you take prescribed drugs: Follow your doctor s orders about which medicines to take and which to stop until after surgery.    Eating and drinking prior to surgery: follow the instructions from your surgeon    Take a shower or bath the night before surgery. Use the soap your surgeon gave you to gently clean your skin. If you do not have soap from your surgeon, use your regular soap. Do not shave or scrub the surgery site.  Wear clean pajamas and have clean sheets on your bed.     Do not take your metformin the night before or the morning of your procedure.    Continue to hold your aspirin.

## 2017-12-18 NOTE — PROGRESS NOTES
[facility]  [address] [facility] [address] [phone]  Dept: [phone]    PRE-OP EVALUATION:  Today's date: 2017    Teresa Hahn (: 1960) presents for pre-operative evaluation assessment as requested by Dr. Binh Lane.  She requires evaluation and anesthesia risk assessment prior to undergoing surgery/procedure for treatment of continuing reconstruction .  Proposed procedure:   MAMMOPLASTY REDUCTION Left General   LEFT BREAST REDUCTION MAMMOPLASTY ; RIGHT BREAST REVISION TRAM FLAP            Date of Surgery/ Procedure: 17  Time of Surgery/ Procedure: 7:30AM  Hospital/Surgical Facility: [facility]    Primary Physician: Yocasta Moreno  Type of Anesthesia Anticipated: General    Patient has a Health Care Directive or Living Will:  NO    Preop Questions 2017   1.  Do you have a history of heart attack, stroke, stent, bypass or surgery on an artery in the head, neck, heart or legs? No   2.  Do you ever have any pain or discomfort in your chest? No   3.  Do you have a history of  Heart Failure? No   4.   Are you troubled by shortness of breath when:  walking on a level surface, or up a slight hill, or at night? No   5.  Do you currently have a cold, bronchitis or other respiratory infection? No   6.  Do you have a cough, shortness of breath, or wheezing? No   7.  Do you sometimes get pains in the calves of your legs when you walk? No   8. Do you or anyone in your family have previous history of blood clots? None known   9.  Do you or does anyone in your family have a serious bleeding problem such as prolonged bleeding following surgeries or cuts? YES - thought to be related to previous pregnancy.   10. Have you ever had problems with anemia or been told to take iron pills? No   11. Have you had any abnormal blood loss such as black, tarry or bloody stools, or abnormal vaginal bleeding? No   12. Have you ever had a blood transfusion? YES -    13. Have you or any  of your relatives ever had problems with anesthesia? YES - post op nausea.   14. Do you have sleep apnea, excessive snoring or daytime drowsiness? YES - possible sleep apnea.   15. Do you have any prosthetic heart valves? No   16. Do you have prosthetic joints? No   17. Is there any chance that you may be pregnant? No           HPI:                                                      Brief HPI related to upcoming procedure: Continued reconstructive surgery planned.      DIABETES - Patient has a longstanding history of DiabetesType Type II . Patient is being treated with diet and oral agents and denies significant side effects. Control has been good. Complicating factors include but are not limited to: None.                                                                                                             .    MEDICAL HISTORY:                                                    Patient Active Problem List    Diagnosis Date Noted     Hyperlipidemia LDL goal <100 07/11/2017     Priority: Medium     Type 2 diabetes mellitus with hyperglycemia, without long-term current use of insulin (H) 03/21/2017     Priority: Medium     Malignant neoplasm of right female breast (H) 02/22/2017     Priority: Medium     Major depressive disorder, single episode in full remission (H) 04/09/2014     Priority: Medium     ADD (attention deficit disorder) 04/12/2013     Priority: Medium     TBI (traumatic brain injury) (H) 12/28/2012     Priority: Medium     age 2. coma x 3 weeks.       Health Care Home 11/10/2010     Priority: Medium     Care Coordination Post ED Assessment    Utilization:  No utilization concerns.  Has f/u with primary clinic tomorrow.    Disease State:  No noticeable improvement in the palsy since beginning treatment.  She had many questions about Bell's Palsy, and education was provided.  Patient stated understanding.        Medications:  Educated patient on indications for treatment with Valtrex, and  Prednisone, and reviewed potential side effects.  Patient stated understanding.    Functionality:No concerns.    Psychosocial:Patient is in good spirits.      Plan:  No further follow up needed at this time.   Bambi Bill RN, PHN,[facility]            DX V65.8 REPLACED WITH [id number] (04/08/2013)       Anxiety 05/18/2010     Priority: Medium     GERD (gastroesophageal reflux disease) 05/20/2008     Priority: Medium     Arthropathy      Priority: Medium     from right ankle fracture.  Problem list name updated by automated process. Provider to review       Acute reaction to stress 04/19/2006     Priority: Medium     Problem list name updated by automated process. Provider to review        Past Medical History:   Diagnosis Date     arthritis     from right ankle fracture.     TBI (traumatic brain injury) (H) 12/28/2012    age 2. coma x 3 weeks.      Past Surgical History:   Procedure Laterality Date     MASTECTOMY, RECONSTRUCT BREAST, COMBINED Right 03/16/2017     SURGICAL HISTORY OF -   12/2004    r leg broken in several areas; now arthritis. 3 surgeries     SURGICAL HISTORY OF -   2008    right ankle fusion     Current Outpatient Prescriptions   Medication Sig Dispense Refill     STATIN NOT PRESCRIBED, INTENTIONAL, Please choose reason not prescribed, below       metFORMIN (GLUCOPHAGE) 1000 MG tablet Take 1 tablet (1,000 mg) by mouth 2 times daily (with meals) 180 tablet 1     blood glucose monitoring (ACCU-CHEK FASTCLIX) lancets Use to test blood sugar 2 times daily or as directed.  Ok to substitute alternative if insurance prefers. (Patient not taking: Reported on 12/7/2017) 1 Box 1     aspirin 81 MG chewable tablet Take 1 tablet (81 mg) by mouth daily 36 tablet 0     blood glucose monitoring (JOLLY CONTOUR NEXT) test strip Use to test blood sugar 4 times daily or as directed.  Ok to substitute alternative if insurance prefers. (Patient not taking: Reported on 12/7/2017) 200 strip 1     blood glucose  monitoring (ACCU-CHEK SHERYL SMARTVIEW) meter device kit   0     blood glucose (NO BRAND SPECIFIED) lancets standard Use to test blood sugar 4 times daily or as directed. Anticipate decrease in the future. (Patient not taking: Reported on 12/7/2017) 1 Box 0     blood glucose monitoring (NO BRAND SPECIFIED) test strip Use to test blood sugars 4 times daily or as directed. Anticipate decreasing in the future. (Patient not taking: Reported on 12/7/2017) 100 strip prn     OTC products: none- no aspirin in her recent hx as well.    No Known Allergies   Latex Allergy: NO    Social History   Substance Use Topics     Smoking status: Never Smoker     Smokeless tobacco: Never Used     Alcohol use Yes      Comment: occasional     History   Drug Use No       REVIEW OF SYSTEMS:                                                    C: NEGATIVE for fever, chills, change in weight  I: NEGATIVE for worrisome rashes, moles or lesions  E: NEGATIVE for vision changes or irritation  E/M: NEGATIVE for ear, mouth and throat problems  R: NEGATIVE for significant cough or SOB  B: NEGATIVE for masses, tenderness or discharge  CV: NEGATIVE for chest pain, palpitations or peripheral edema  GI: NEGATIVE for nausea, abdominal pain, heartburn, or change in bowel habits  : NEGATIVE for frequency, dysuria, or hematuria  M: NEGATIVE for significant arthralgias or myalgia  N: NEGATIVE for weakness, dizziness or paresthesias  E: NEGATIVE for temperature intolerance, skin/hair changes  H: NEGATIVE for bleeding problems  P: NEGATIVE for changes in mood or affect    EXAM:                                                    /68  Pulse 90  Temp 97.9  F (36.6  C) (Oral)  Wt 154 lb 8 oz (70.1 kg)  LMP 11/18/2013  SpO2 96%  BMI 29.43 kg/m2    GENERAL APPEARANCE: healthy, alert and no distress     EYES: Eyes grossly normal to inspection     HENT: ear canals and TM's normal and nose and mouth without ulcers or lesions     NECK: no adenopathy, no  asymmetry, masses, or scars and thyroid normal to palpation     RESP: lungs clear to auscultation - no rales, rhonchi or wheezes     CV: regular rates and rhythm, normal S1 S2, no S3 or S4 and no murmur, click or rub     ABDOMEN:  soft, nontender, no HSM or masses and bowel sounds normal     SKIN: no suspicious lesions or rashes     NEURO: Normal strength and tone, sensory exam grossly normal, mentation intact and speech normal     PSYCH: mentation appears normal. and affect normal/bright     LYMPHATICS: No axillary, cervical, or supraclavicular nodes    DIAGNOSTICS:                                                    EKG: appears normal, NSR  No previous EKG completed.  Completed today due to family hx of 1st degree relative with early cardiac disease.  Short AL seen - incidental finding.  Close monitoring during procedure for rhythm change.    Hemoglobin (indicated for history of anemia or procedure with significant blood loss such as tonsillectomy, major intraperitoneal surgery, vascular surgery, major spine surgery, total joint replacement)  Serum Potassium  Serum Creatinine    Hemoglobin   Date Value Ref Range Status   12/18/2017 14.1 11.7 - 15.7 g/dL Final     Last Basic Metabolic Panel:  Lab Results   Component Value Date     12/18/2017      Lab Results   Component Value Date    POTASSIUM 4.0 12/18/2017     Lab Results   Component Value Date    CHLORIDE 105 12/18/2017     Lab Results   Component Value Date    SAUD 9.1 12/18/2017     Lab Results   Component Value Date    CO2 30 12/18/2017     Lab Results   Component Value Date    BUN 15 12/18/2017     Lab Results   Component Value Date    CR 0.61 12/18/2017     Lab Results   Component Value Date     12/18/2017         Lab Results   Component Value Date    A1C 6.2 11/28/2017    A1C 5.9 07/11/2017    A1C 9.2 03/17/2017         Recent Labs   Lab Test  11/28/17   0904  07/11/17   0940 03/17/17 03/07/17   1107  11/05/10   1120   HGB   --    --    --     --   15.0  13.4   PLT   --    --    --    --   170  174   NA   --    --    --    --    --   140   POTASSIUM   --    --   3.7   --    --   4.2   CR   --    --   0.72   --    --   0.81   A1C  6.2*  5.9  9.2   < >   --    --     < > = values in this interval not displayed.        IMPRESSION:                                                    Reason for surgery/procedure: Continued breast reconstruction.  Diagnosis/reason for consult: preop, hx of DM     The proposed surgical procedure is considered INTERMEDIATE risk.    REVISED CARDIAC RISK INDEX  The patient has the following serious cardiovascular risks for perioperative complications such as (MI, PE, VFib and 3  AV Block):  No serious cardiac risks  INTERPRETATION: 0 risks: Class I (very low risk - 0.4% complication rate)    The patient has the following additional risks for perioperative complications:  No identified additional risks      ICD-10-CM    1. Preop general physical exam Z01.818 Hemoglobin     Comprehensive metabolic panel (BMP + Alb, Alk Phos, ALT, AST, Total. Bili, TP)     EKG 12-lead complete w/read - Clinics   2. Malignant neoplasm of upper-inner quadrant of right breast in female, estrogen receptor positive (H) C50.211     Z17.0        RECOMMENDATIONS:                                                        Obstructive Sleep Apnea (or suspected sleep apnea)  Hospital staff are advised to monitor for sleep related oxygen desaturations due to suspicion of SHARAD    --Patient is to take all scheduled medications on the day of surgery EXCEPT for modifications listed below.    Diabetes Medication Use  -----Hold usual  oral diabetic meds (e.g. Metformin, Actos, Glipizide) while NPO.       APPROVAL GIVEN to proceed with proposed procedure, without further diagnostic evaluation       Signed Electronically by: CARL Dotson Ra CNP    Copy of this evaluation report is provided to requesting physician.    [facility] Preop Guidelines

## 2017-12-18 NOTE — NURSING NOTE
"Chief Complaint   Patient presents with     Pre-Op Exam       Initial /68  Pulse 90  Temp 97.9  F (36.6  C) (Oral)  Wt 154 lb 8 oz (70.1 kg)  LMP 11/18/2013  SpO2 96%  BMI 29.43 kg/m2 Estimated body mass index is 29.43 kg/(m^2) as calculated from the following:    Height as of 12/7/17: 5' 0.75\" (1.543 m).    Weight as of this encounter: 154 lb 8 oz (70.1 kg).  Medication Reconciliation: complete   Rhea VANG M.A.      "

## 2017-12-18 NOTE — LETTER
[facility]  [address] [facility] [address]  [phone]          December 20, 2017    Teresa Hahn                                                                                                                     [id number]  [facility] [address]            Dear Teresa,    Here are copies of your recent labs.  Everything looked very good.  Normal hemoglobin, normal kidney and liver function.  Please let me know if you have any questions or concerns.  I hope surgery goes smoothly for you.    Thank you,          Celeste FLORESNP

## 2017-12-19 LAB
ALBUMIN SERPL-MCNC: 3.9 G/DL (ref 3.4–5)
ALP SERPL-CCNC: 101 U/L (ref 40–150)
ALT SERPL W P-5'-P-CCNC: 28 U/L (ref 0–50)
ANION GAP SERPL CALCULATED.3IONS-SCNC: 7 MMOL/L (ref 3–14)
AST SERPL W P-5'-P-CCNC: 16 U/L (ref 0–45)
BILIRUB SERPL-MCNC: 0.4 MG/DL (ref 0.2–1.3)
BUN SERPL-MCNC: 15 MG/DL (ref 7–30)
CALCIUM SERPL-MCNC: 9.1 MG/DL (ref 8.5–10.1)
CHLORIDE SERPL-SCNC: 105 MMOL/L (ref 94–109)
CO2 SERPL-SCNC: 30 MMOL/L (ref 20–32)
CREAT SERPL-MCNC: 0.61 MG/DL (ref 0.52–1.04)
GFR SERPL CREATININE-BSD FRML MDRD: >90 ML/MIN/1.7M2
GLUCOSE SERPL-MCNC: 112 MG/DL (ref 70–99)
POTASSIUM SERPL-SCNC: 4 MMOL/L (ref 3.4–5.3)
PROT SERPL-MCNC: 6.8 G/DL (ref 6.8–8.8)
SODIUM SERPL-SCNC: 142 MMOL/L (ref 133–144)

## 2017-12-26 NOTE — H&P (VIEW-ONLY)
[facility]  [address] [facility] [id number] [phone]  Dept: [phone]    PRE-OP EVALUATION:  Today's date: 2017    Teresa Hahn (: 1960) presents for pre-operative evaluation assessment as requested by Dr. Binh Lane.  She requires evaluation and anesthesia risk assessment prior to undergoing surgery/procedure for treatment of continuing reconstruction .  Proposed procedure:   MAMMOPLASTY REDUCTION Left General   LEFT BREAST REDUCTION MAMMOPLASTY ; RIGHT BREAST REVISION TRAM FLAP            Date of Surgery/ Procedure: 17  Time of Surgery/ Procedure: 7:30AM  Hospital/Surgical Facility: [facility]    Primary Physician: Yocasta Moreno  Type of Anesthesia Anticipated: General    Patient has a Health Care Directive or Living Will:  NO    Preop Questions 2017   1.  Do you have a history of heart attack, stroke, stent, bypass or surgery on an artery in the head, neck, heart or legs? No   2.  Do you ever have any pain or discomfort in your chest? No   3.  Do you have a history of  Heart Failure? No   4.   Are you troubled by shortness of breath when:  walking on a level surface, or up a slight hill, or at night? No   5.  Do you currently have a cold, bronchitis or other respiratory infection? No   6.  Do you have a cough, shortness of breath, or wheezing? No   7.  Do you sometimes get pains in the calves of your legs when you walk? No   8. Do you or anyone in your family have previous history of blood clots? None known   9.  Do you or does anyone in your family have a serious bleeding problem such as prolonged bleeding following surgeries or cuts? YES - thought to be related to previous pregnancy.   10. Have you ever had problems with anemia or been told to take iron pills? No   11. Have you had any abnormal blood loss such as black, tarry or bloody stools, or abnormal vaginal bleeding? No   12. Have you ever had a blood transfusion? YES -    13. Have you or any  of your relatives ever had problems with anesthesia? YES - post op nausea.   14. Do you have sleep apnea, excessive snoring or daytime drowsiness? YES - possible sleep apnea.   15. Do you have any prosthetic heart valves? No   16. Do you have prosthetic joints? No   17. Is there any chance that you may be pregnant? No           HPI:                                                      Brief HPI related to upcoming procedure: Continued reconstructive surgery planned.      DIABETES - Patient has a longstanding history of DiabetesType Type II . Patient is being treated with diet and oral agents and denies significant side effects. Control has been good. Complicating factors include but are not limited to: None.                                                                                                             .    MEDICAL HISTORY:                                                    Patient Active Problem List    Diagnosis Date Noted     Hyperlipidemia LDL goal <100 07/11/2017     Priority: Medium     Type 2 diabetes mellitus with hyperglycemia, without long-term current use of insulin (H) 03/21/2017     Priority: Medium     Malignant neoplasm of right female breast (H) 02/22/2017     Priority: Medium     Major depressive disorder, single episode in full remission (H) 04/09/2014     Priority: Medium     ADD (attention deficit disorder) 04/12/2013     Priority: Medium     TBI (traumatic brain injury) (H) 12/28/2012     Priority: Medium     age 2. coma x 3 weeks.       Health Care Home 11/10/2010     Priority: Medium     Care Coordination Post ED Assessment    Utilization:  No utilization concerns.  Has f/u with primary clinic tomorrow.    Disease State:  No noticeable improvement in the palsy since beginning treatment.  She had many questions about Bell's Palsy, and education was provided.  Patient stated understanding.        Medications:  Educated patient on indications for treatment with Valtrex, and  Prednisone, and reviewed potential side effects.  Patient stated understanding.    Functionality:No concerns.    Psychosocial:Patient is in good spirits.      Plan:  No further follow up needed at this time.   Bambi Bill RN, PHN,[facility]            DX V65.8 REPLACED WITH [id number] (04/08/2013)       Anxiety 05/18/2010     Priority: Medium     GERD (gastroesophageal reflux disease) 05/20/2008     Priority: Medium     Arthropathy      Priority: Medium     from right ankle fracture.  Problem list name updated by automated process. Provider to review       Acute reaction to stress 04/19/2006     Priority: Medium     Problem list name updated by automated process. Provider to review        Past Medical History:   Diagnosis Date     arthritis     from right ankle fracture.     TBI (traumatic brain injury) (H) 12/28/2012    age 2. coma x 3 weeks.      Past Surgical History:   Procedure Laterality Date     MASTECTOMY, RECONSTRUCT BREAST, COMBINED Right 03/16/2017     SURGICAL HISTORY OF -   12/2004    r leg broken in several areas; now arthritis. 3 surgeries     SURGICAL HISTORY OF -   2008    right ankle fusion     Current Outpatient Prescriptions   Medication Sig Dispense Refill     STATIN NOT PRESCRIBED, INTENTIONAL, Please choose reason not prescribed, below       metFORMIN (GLUCOPHAGE) 1000 MG tablet Take 1 tablet (1,000 mg) by mouth 2 times daily (with meals) 180 tablet 1     blood glucose monitoring (ACCU-CHEK FASTCLIX) lancets Use to test blood sugar 2 times daily or as directed.  Ok to substitute alternative if insurance prefers. (Patient not taking: Reported on 12/7/2017) 1 Box 1     aspirin 81 MG chewable tablet Take 1 tablet (81 mg) by mouth daily 36 tablet 0     blood glucose monitoring (JOLLY CONTOUR NEXT) test strip Use to test blood sugar 4 times daily or as directed.  Ok to substitute alternative if insurance prefers. (Patient not taking: Reported on 12/7/2017) 200 strip 1     blood glucose  monitoring (ACCU-CHEK SHERYL SMARTVIEW) meter device kit   0     blood glucose (NO BRAND SPECIFIED) lancets standard Use to test blood sugar 4 times daily or as directed. Anticipate decrease in the future. (Patient not taking: Reported on 12/7/2017) 1 Box 0     blood glucose monitoring (NO BRAND SPECIFIED) test strip Use to test blood sugars 4 times daily or as directed. Anticipate decreasing in the future. (Patient not taking: Reported on 12/7/2017) 100 strip prn     OTC products: none- no aspirin in her recent hx as well.    No Known Allergies   Latex Allergy: NO    Social History   Substance Use Topics     Smoking status: Never Smoker     Smokeless tobacco: Never Used     Alcohol use Yes      Comment: occasional     History   Drug Use No       REVIEW OF SYSTEMS:                                                    C: NEGATIVE for fever, chills, change in weight  I: NEGATIVE for worrisome rashes, moles or lesions  E: NEGATIVE for vision changes or irritation  E/M: NEGATIVE for ear, mouth and throat problems  R: NEGATIVE for significant cough or SOB  B: NEGATIVE for masses, tenderness or discharge  CV: NEGATIVE for chest pain, palpitations or peripheral edema  GI: NEGATIVE for nausea, abdominal pain, heartburn, or change in bowel habits  : NEGATIVE for frequency, dysuria, or hematuria  M: NEGATIVE for significant arthralgias or myalgia  N: NEGATIVE for weakness, dizziness or paresthesias  E: NEGATIVE for temperature intolerance, skin/hair changes  H: NEGATIVE for bleeding problems  P: NEGATIVE for changes in mood or affect    EXAM:                                                    /68  Pulse 90  Temp 97.9  F (36.6  C) (Oral)  Wt 154 lb 8 oz (70.1 kg)  LMP 11/18/2013  SpO2 96%  BMI 29.43 kg/m2    GENERAL APPEARANCE: healthy, alert and no distress     EYES: Eyes grossly normal to inspection     HENT: ear canals and TM's normal and nose and mouth without ulcers or lesions     NECK: no adenopathy, no  asymmetry, masses, or scars and thyroid normal to palpation     RESP: lungs clear to auscultation - no rales, rhonchi or wheezes     CV: regular rates and rhythm, normal S1 S2, no S3 or S4 and no murmur, click or rub     ABDOMEN:  soft, nontender, no HSM or masses and bowel sounds normal     SKIN: no suspicious lesions or rashes     NEURO: Normal strength and tone, sensory exam grossly normal, mentation intact and speech normal     PSYCH: mentation appears normal. and affect normal/bright     LYMPHATICS: No axillary, cervical, or supraclavicular nodes    DIAGNOSTICS:                                                    EKG: appears normal, NSR  No previous EKG completed.  Completed today due to family hx of 1st degree relative with early cardiac disease.  Short [place] seen - incidental finding.  Close monitoring during procedure for rhythm change.    Hemoglobin (indicated for history of anemia or procedure with significant blood loss such as tonsillectomy, major intraperitoneal surgery, vascular surgery, major spine surgery, total joint replacement)  Serum Potassium  Serum Creatinine    Hemoglobin   Date Value Ref Range Status   12/18/2017 14.1 11.7 - 15.7 g/dL Final     Last Basic Metabolic Panel:  Lab Results   Component Value Date     12/18/2017      Lab Results   Component Value Date    POTASSIUM 4.0 12/18/2017     Lab Results   Component Value Date    CHLORIDE 105 12/18/2017     Lab Results   Component Value Date    SAUD 9.1 12/18/2017     Lab Results   Component Value Date    CO2 30 12/18/2017     Lab Results   Component Value Date    BUN 15 12/18/2017     Lab Results   Component Value Date    CR 0.61 12/18/2017     Lab Results   Component Value Date     12/18/2017         Lab Results   Component Value Date    A1C 6.2 11/28/2017    A1C 5.9 07/11/2017    A1C 9.2 03/17/2017         Recent Labs   Lab Test  11/28/17   0904  07/11/17   0940 03/17/17 03/07/17   1107  11/05/10   1120   HGB   --    --    --     --   15.0  13.4   PLT   --    --    --    --   170  174   NA   --    --    --    --    --   140   POTASSIUM   --    --   3.7   --    --   4.2   CR   --    --   0.72   --    --   0.81   A1C  6.2*  5.9  9.2   < >   --    --     < > = values in this interval not displayed.        IMPRESSION:                                                    Reason for surgery/procedure: Continued breast reconstruction.  Diagnosis/reason for consult: preop, hx of DM     The proposed surgical procedure is considered INTERMEDIATE risk.    REVISED CARDIAC RISK INDEX  The patient has the following serious cardiovascular risks for perioperative complications such as (MI, PE, VFib and 3  AV Block):  No serious cardiac risks  INTERPRETATION: 0 risks: Class I (very low risk - 0.4% complication rate)    The patient has the following additional risks for perioperative complications:  No identified additional risks      ICD-10-CM    1. Preop general physical exam Z01.818 Hemoglobin     Comprehensive metabolic panel (BMP + Alb, Alk Phos, ALT, AST, Total. Bili, TP)     EKG 12-lead complete w/read - Clinics   2. Malignant neoplasm of upper-inner quadrant of right breast in female, estrogen receptor positive (H) C50.211     Z17.0        RECOMMENDATIONS:                                                        Obstructive Sleep Apnea (or suspected sleep apnea)  Hospital staff are advised to monitor for sleep related oxygen desaturations due to suspicion of SHARAD    --Patient is to take all scheduled medications on the day of surgery EXCEPT for modifications listed below.    Diabetes Medication Use  -----Hold usual  oral diabetic meds (e.g. Metformin, Actos, Glipizide) while NPO.       APPROVAL GIVEN to proceed with proposed procedure, without further diagnostic evaluation       Signed Electronically by: CARL Dotson Ra CNP    Copy of this evaluation report is provided to requesting physician.    [facility] Preop Guidelines

## 2017-12-28 ENCOUNTER — SURGERY (OUTPATIENT)
Age: 57
End: 2017-12-28

## 2017-12-28 ENCOUNTER — HOSPITAL ENCOUNTER (OUTPATIENT)
Facility: CLINIC | Age: 57
Discharge: HOME | End: 2017-12-28
Attending: PLASTIC SURGERY | Admitting: PLASTIC SURGERY
Payer: COMMERCIAL

## 2017-12-28 ENCOUNTER — ANESTHESIA EVENT (OUTPATIENT)
Dept: SURGERY | Facility: CLINIC | Age: 57
End: 2017-12-28
Payer: COMMERCIAL

## 2017-12-28 ENCOUNTER — ANESTHESIA (OUTPATIENT)
Dept: SURGERY | Facility: CLINIC | Age: 57
End: 2017-12-28
Payer: COMMERCIAL

## 2017-12-28 VITALS
SYSTOLIC BLOOD PRESSURE: 101 MMHG | TEMPERATURE: 98.4 F | HEIGHT: 62 IN | DIASTOLIC BLOOD PRESSURE: 68 MMHG | RESPIRATION RATE: 16 BRPM | HEART RATE: 66 BPM | BODY MASS INDEX: 28.32 KG/M2 | OXYGEN SATURATION: 94 % | WEIGHT: 153.9 LBS

## 2017-12-28 DIAGNOSIS — Z85.3 PERSONAL HISTORY OF MALIGNANT NEOPLASM OF BREAST: Primary | ICD-10-CM

## 2017-12-28 PROCEDURE — 25000125 ZZHC RX 250: Performed by: PLASTIC SURGERY

## 2017-12-28 PROCEDURE — 25000132 ZZH RX MED GY IP 250 OP 250 PS 637: Performed by: PLASTIC SURGERY

## 2017-12-28 PROCEDURE — 25000128 H RX IP 250 OP 636: Performed by: NURSE ANESTHETIST, CERTIFIED REGISTERED

## 2017-12-28 PROCEDURE — 88305 TISSUE EXAM BY PATHOLOGIST: CPT | Mod: 26 | Performed by: PLASTIC SURGERY

## 2017-12-28 PROCEDURE — 37000009 ZZH ANESTHESIA TECHNICAL FEE, EACH ADDTL 15 MIN: Performed by: PLASTIC SURGERY

## 2017-12-28 PROCEDURE — 88305 TISSUE EXAM BY PATHOLOGIST: CPT | Performed by: PLASTIC SURGERY

## 2017-12-28 PROCEDURE — 40000170 ZZH STATISTIC PRE-PROCEDURE ASSESSMENT II: Performed by: PLASTIC SURGERY

## 2017-12-28 PROCEDURE — 25000128 H RX IP 250 OP 636: Performed by: PLASTIC SURGERY

## 2017-12-28 PROCEDURE — 36000058 ZZH SURGERY LEVEL 3 EA 15 ADDTL MIN: Performed by: PLASTIC SURGERY

## 2017-12-28 PROCEDURE — 71000013 ZZH RECOVERY PHASE 1 LEVEL 1 EA ADDTL HR: Performed by: PLASTIC SURGERY

## 2017-12-28 PROCEDURE — 25000125 ZZHC RX 250: Performed by: NURSE ANESTHETIST, CERTIFIED REGISTERED

## 2017-12-28 PROCEDURE — 71000027 ZZH RECOVERY PHASE 2 EACH 15 MINS: Performed by: PLASTIC SURGERY

## 2017-12-28 PROCEDURE — 25000128 H RX IP 250 OP 636: Performed by: ANESTHESIOLOGY

## 2017-12-28 PROCEDURE — 71000012 ZZH RECOVERY PHASE 1 LEVEL 1 FIRST HR: Performed by: PLASTIC SURGERY

## 2017-12-28 PROCEDURE — 27210995 ZZH RX 272: Performed by: PLASTIC SURGERY

## 2017-12-28 PROCEDURE — 37000008 ZZH ANESTHESIA TECHNICAL FEE, 1ST 30 MIN: Performed by: PLASTIC SURGERY

## 2017-12-28 PROCEDURE — 36000056 ZZH SURGERY LEVEL 3 1ST 30 MIN: Performed by: PLASTIC SURGERY

## 2017-12-28 PROCEDURE — 27210794 ZZH OR GENERAL SUPPLY STERILE: Performed by: PLASTIC SURGERY

## 2017-12-28 RX ORDER — PROPOFOL 10 MG/ML
INJECTION, EMULSION INTRAVENOUS CONTINUOUS PRN
Status: DISCONTINUED | OUTPATIENT
Start: 2017-12-28 | End: 2017-12-28

## 2017-12-28 RX ORDER — HYDROMORPHONE HYDROCHLORIDE 1 MG/ML
.3-.5 INJECTION, SOLUTION INTRAMUSCULAR; INTRAVENOUS; SUBCUTANEOUS EVERY 10 MIN PRN
Status: DISCONTINUED | OUTPATIENT
Start: 2017-12-28 | End: 2017-12-28 | Stop reason: HOSPADM

## 2017-12-28 RX ORDER — HYDROXYZINE HYDROCHLORIDE 25 MG/1
25 TABLET, FILM COATED ORAL
Status: DISCONTINUED | OUTPATIENT
Start: 2017-12-28 | End: 2017-12-28 | Stop reason: HOSPADM

## 2017-12-28 RX ORDER — ONDANSETRON 4 MG/1
4 TABLET, ORALLY DISINTEGRATING ORAL
Status: DISCONTINUED | OUTPATIENT
Start: 2017-12-28 | End: 2017-12-28 | Stop reason: HOSPADM

## 2017-12-28 RX ORDER — MEPERIDINE HYDROCHLORIDE 25 MG/ML
12.5 INJECTION INTRAMUSCULAR; INTRAVENOUS; SUBCUTANEOUS
Status: DISCONTINUED | OUTPATIENT
Start: 2017-12-28 | End: 2017-12-28 | Stop reason: HOSPADM

## 2017-12-28 RX ORDER — ONDANSETRON 2 MG/ML
INJECTION INTRAMUSCULAR; INTRAVENOUS PRN
Status: DISCONTINUED | OUTPATIENT
Start: 2017-12-28 | End: 2017-12-28

## 2017-12-28 RX ORDER — NALOXONE HYDROCHLORIDE 0.4 MG/ML
.1-.4 INJECTION, SOLUTION INTRAMUSCULAR; INTRAVENOUS; SUBCUTANEOUS
Status: DISCONTINUED | OUTPATIENT
Start: 2017-12-28 | End: 2017-12-28 | Stop reason: HOSPADM

## 2017-12-28 RX ORDER — ACETAMINOPHEN 325 MG/1
650 TABLET ORAL
Status: DISCONTINUED | OUTPATIENT
Start: 2017-12-28 | End: 2017-12-28 | Stop reason: HOSPADM

## 2017-12-28 RX ORDER — DEXAMETHASONE SODIUM PHOSPHATE 4 MG/ML
INJECTION, SOLUTION INTRA-ARTICULAR; INTRALESIONAL; INTRAMUSCULAR; INTRAVENOUS; SOFT TISSUE PRN
Status: DISCONTINUED | OUTPATIENT
Start: 2017-12-28 | End: 2017-12-28

## 2017-12-28 RX ORDER — CELECOXIB 200 MG/1
400 CAPSULE ORAL ONCE
Status: DISCONTINUED | OUTPATIENT
Start: 2017-12-28 | End: 2017-12-28 | Stop reason: HOSPADM

## 2017-12-28 RX ORDER — PROPOFOL 10 MG/ML
INJECTION, EMULSION INTRAVENOUS PRN
Status: DISCONTINUED | OUTPATIENT
Start: 2017-12-28 | End: 2017-12-28

## 2017-12-28 RX ORDER — ACETAMINOPHEN 10 MG/ML
1000 INJECTION, SOLUTION INTRAVENOUS
Status: COMPLETED | OUTPATIENT
Start: 2017-12-28 | End: 2017-12-28

## 2017-12-28 RX ORDER — ONDANSETRON 4 MG/1
4 TABLET, ORALLY DISINTEGRATING ORAL EVERY 30 MIN PRN
Status: DISCONTINUED | OUTPATIENT
Start: 2017-12-28 | End: 2017-12-28 | Stop reason: HOSPADM

## 2017-12-28 RX ORDER — HYDROCODONE BITARTRATE AND ACETAMINOPHEN 5; 325 MG/1; MG/1
2 TABLET ORAL
Status: COMPLETED | OUTPATIENT
Start: 2017-12-28 | End: 2017-12-28

## 2017-12-28 RX ORDER — FENTANYL CITRATE 50 UG/ML
25-50 INJECTION, SOLUTION INTRAMUSCULAR; INTRAVENOUS
Status: DISCONTINUED | OUTPATIENT
Start: 2017-12-28 | End: 2017-12-28 | Stop reason: HOSPADM

## 2017-12-28 RX ORDER — FENTANYL CITRATE 50 UG/ML
25-50 INJECTION, SOLUTION INTRAMUSCULAR; INTRAVENOUS EVERY 5 MIN PRN
Status: DISCONTINUED | OUTPATIENT
Start: 2017-12-28 | End: 2017-12-28 | Stop reason: HOSPADM

## 2017-12-28 RX ORDER — HYDROCODONE BITARTRATE AND ACETAMINOPHEN 5; 325 MG/1; MG/1
1-2 TABLET ORAL EVERY 4 HOURS PRN
Qty: 40 TABLET | Refills: 0 | Status: SHIPPED | OUTPATIENT
Start: 2017-12-28 | End: 2018-07-19

## 2017-12-28 RX ORDER — FENTANYL CITRATE 50 UG/ML
INJECTION, SOLUTION INTRAMUSCULAR; INTRAVENOUS PRN
Status: DISCONTINUED | OUTPATIENT
Start: 2017-12-28 | End: 2017-12-28

## 2017-12-28 RX ORDER — SODIUM CHLORIDE, SODIUM LACTATE, POTASSIUM CHLORIDE, CALCIUM CHLORIDE 600; 310; 30; 20 MG/100ML; MG/100ML; MG/100ML; MG/100ML
INJECTION, SOLUTION INTRAVENOUS CONTINUOUS PRN
Status: DISCONTINUED | OUTPATIENT
Start: 2017-12-28 | End: 2017-12-28

## 2017-12-28 RX ORDER — LIDOCAINE HYDROCHLORIDE 20 MG/ML
INJECTION, SOLUTION INFILTRATION; PERINEURAL PRN
Status: DISCONTINUED | OUTPATIENT
Start: 2017-12-28 | End: 2017-12-28

## 2017-12-28 RX ORDER — PHYSOSTIGMINE SALICYLATE 1 MG/ML
1.2 INJECTION INTRAVENOUS
Status: DISCONTINUED | OUTPATIENT
Start: 2017-12-28 | End: 2017-12-28 | Stop reason: HOSPADM

## 2017-12-28 RX ORDER — ACETAMINOPHEN 10 MG/ML
INJECTION, SOLUTION INTRAVENOUS
Status: DISCONTINUED
Start: 2017-12-28 | End: 2017-12-28 | Stop reason: HOSPADM

## 2017-12-28 RX ORDER — SODIUM CHLORIDE, SODIUM LACTATE, POTASSIUM CHLORIDE, CALCIUM CHLORIDE 600; 310; 30; 20 MG/100ML; MG/100ML; MG/100ML; MG/100ML
INJECTION, SOLUTION INTRAVENOUS CONTINUOUS
Status: DISCONTINUED | OUTPATIENT
Start: 2017-12-28 | End: 2017-12-28 | Stop reason: HOSPADM

## 2017-12-28 RX ORDER — BUPIVACAINE HYDROCHLORIDE AND EPINEPHRINE 2.5; 5 MG/ML; UG/ML
INJECTION, SOLUTION INFILTRATION; PERINEURAL PRN
Status: DISCONTINUED | OUTPATIENT
Start: 2017-12-28 | End: 2017-12-28 | Stop reason: HOSPADM

## 2017-12-28 RX ORDER — ONDANSETRON 2 MG/ML
4 INJECTION INTRAMUSCULAR; INTRAVENOUS EVERY 30 MIN PRN
Status: DISCONTINUED | OUTPATIENT
Start: 2017-12-28 | End: 2017-12-28 | Stop reason: HOSPADM

## 2017-12-28 RX ORDER — CEFAZOLIN SODIUM 2 G/100ML
2 INJECTION, SOLUTION INTRAVENOUS
Status: COMPLETED | OUTPATIENT
Start: 2017-12-28 | End: 2017-12-28

## 2017-12-28 RX ORDER — EPHEDRINE SULFATE 50 MG/ML
INJECTION, SOLUTION INTRAMUSCULAR; INTRAVENOUS; SUBCUTANEOUS PRN
Status: DISCONTINUED | OUTPATIENT
Start: 2017-12-28 | End: 2017-12-28

## 2017-12-28 RX ORDER — EPINEPHRINE 1 MG/ML
INJECTION, SOLUTION INTRAMUSCULAR; SUBCUTANEOUS
Status: DISCONTINUED
Start: 2017-12-28 | End: 2017-12-28 | Stop reason: HOSPADM

## 2017-12-28 RX ORDER — BUPIVACAINE HYDROCHLORIDE 2.5 MG/ML
INJECTION, SOLUTION EPIDURAL; INFILTRATION; INTRACAUDAL
Status: DISCONTINUED
Start: 2017-12-28 | End: 2017-12-28 | Stop reason: HOSPADM

## 2017-12-28 RX ORDER — BUPIVACAINE HYDROCHLORIDE AND EPINEPHRINE 5; 5 MG/ML; UG/ML
INJECTION, SOLUTION EPIDURAL; INTRACAUDAL; PERINEURAL
Status: DISCONTINUED
Start: 2017-12-28 | End: 2017-12-28 | Stop reason: HOSPADM

## 2017-12-28 RX ORDER — MAGNESIUM HYDROXIDE 1200 MG/15ML
LIQUID ORAL PRN
Status: DISCONTINUED | OUTPATIENT
Start: 2017-12-28 | End: 2017-12-28 | Stop reason: HOSPADM

## 2017-12-28 RX ADMIN — PROPOFOL 180 MCG/KG/MIN: 10 INJECTION, EMULSION INTRAVENOUS at 07:39

## 2017-12-28 RX ADMIN — PROPOFOL 200 MG: 10 INJECTION, EMULSION INTRAVENOUS at 07:39

## 2017-12-28 RX ADMIN — PHENYLEPHRINE HYDROCHLORIDE 100 MCG: 10 INJECTION INTRAVENOUS at 07:58

## 2017-12-28 RX ADMIN — Medication 5 MG: at 08:05

## 2017-12-28 RX ADMIN — PHENYLEPHRINE HYDROCHLORIDE 100 MCG: 10 INJECTION INTRAVENOUS at 08:15

## 2017-12-28 RX ADMIN — HYDROCODONE BITARTRATE AND ACETAMINOPHEN 1 TABLET: 5; 325 TABLET ORAL at 10:49

## 2017-12-28 RX ADMIN — SODIUM CHLORIDE, POTASSIUM CHLORIDE, SODIUM LACTATE AND CALCIUM CHLORIDE: 600; 310; 30; 20 INJECTION, SOLUTION INTRAVENOUS at 09:14

## 2017-12-28 RX ADMIN — ACETAMINOPHEN 1000 MG: 10 INJECTION, SOLUTION INTRAVENOUS at 07:51

## 2017-12-28 RX ADMIN — FENTANYL CITRATE 25 MCG: 50 INJECTION, SOLUTION INTRAMUSCULAR; INTRAVENOUS at 08:38

## 2017-12-28 RX ADMIN — SODIUM CHLORIDE 1000 ML: 0.9 IRRIGANT IRRIGATION at 09:09

## 2017-12-28 RX ADMIN — MIDAZOLAM 2 MG: 1 INJECTION INTRAMUSCULAR; INTRAVENOUS at 07:39

## 2017-12-28 RX ADMIN — BUPIVACAINE HYDROCHLORIDE AND EPINEPHRINE BITARTRATE 15 ML: 2.5; .005 INJECTION, SOLUTION EPIDURAL; INFILTRATION; INTRACAUDAL; PERINEURAL at 07:57

## 2017-12-28 RX ADMIN — ONDANSETRON 4 MG: 2 INJECTION INTRAMUSCULAR; INTRAVENOUS at 09:25

## 2017-12-28 RX ADMIN — SODIUM CHLORIDE, POTASSIUM CHLORIDE, SODIUM LACTATE AND CALCIUM CHLORIDE: 600; 310; 30; 20 INJECTION, SOLUTION INTRAVENOUS at 07:34

## 2017-12-28 RX ADMIN — FENTANYL CITRATE 25 MCG: 50 INJECTION, SOLUTION INTRAMUSCULAR; INTRAVENOUS at 08:30

## 2017-12-28 RX ADMIN — LIDOCAINE HYDROCHLORIDE 60 MG: 20 INJECTION, SOLUTION INFILTRATION; PERINEURAL at 07:39

## 2017-12-28 RX ADMIN — DEXAMETHASONE SODIUM PHOSPHATE 4 MG: 4 INJECTION, SOLUTION INTRA-ARTICULAR; INTRALESIONAL; INTRAMUSCULAR; INTRAVENOUS; SOFT TISSUE at 07:47

## 2017-12-28 RX ADMIN — FENTANYL CITRATE 50 MCG: 50 INJECTION, SOLUTION INTRAMUSCULAR; INTRAVENOUS at 10:27

## 2017-12-28 RX ADMIN — FENTANYL CITRATE 50 MCG: 50 INJECTION, SOLUTION INTRAMUSCULAR; INTRAVENOUS at 07:39

## 2017-12-28 RX ADMIN — CEFAZOLIN SODIUM 2 G: 2 INJECTION, SOLUTION INTRAVENOUS at 07:42

## 2017-12-28 RX ADMIN — FENTANYL CITRATE 50 MCG: 50 INJECTION, SOLUTION INTRAMUSCULAR; INTRAVENOUS at 08:51

## 2017-12-28 RX ADMIN — BUPIVACAINE HYDROCHLORIDE AND EPINEPHRINE BITARTRATE 20 ML: 2.5; .005 INJECTION, SOLUTION EPIDURAL; INFILTRATION; INTRACAUDAL; PERINEURAL at 09:09

## 2017-12-28 NOTE — ANESTHESIA PREPROCEDURE EVALUATION
Anesthesia Evaluation     . Pt has had prior anesthetic. Type: General           ROS/MED HX    ENT/Pulmonary:       Neurologic:     (+)other neuro traumatic brain injury - age 2    Cardiovascular:     (+) Dyslipidemia, ----. : . . . :. .       METS/Exercise Tolerance:     Hematologic:         Musculoskeletal:   (+) arthritis, , , -       GI/Hepatic:     (+) GERD       Renal/Genitourinary:         Endo:     (+) type II DM Not using insulin .      Psychiatric:     (+) psychiatric history anxiety and depression      Infectious Disease:         Malignancy:   (+) Malignancy History of Breast          Other:                                    Anesthesia Plan      History & Physical Review  History and physical reviewed and following examination; no interval change.    ASA Status:  2 .        Plan for General with Intravenous induction. Maintenance will be TIVA.    PONV prophylaxis:  Ondansetron (or other 5HT-3) and Dexamethasone or Solumedrol  Propofol, Zofran, and decadron      Postoperative Care  Postoperative pain management:  IV analgesics and Oral pain medications.      Consents  Anesthetic plan, risks, benefits and alternatives discussed with:  Patient..                          .

## 2017-12-28 NOTE — IP AVS SNAPSHOT
MRN:[id number]                      After Visit Summary   12/28/2017    Teresa Hahn    MRN: [id number]           Thank you!     Thank you for choosing [facility] for your care. Our goal is always to provide you with excellent care. Hearing back from our patients is one way we can continue to improve our services. Please take a few minutes to complete the written survey that you may receive in the mail after you visit with us. Thank you!        Patient Information     Date Of Birth          1960        About your hospital stay     You were admitted on:  December 28, 2017 [facility]    You were discharged on:  December 28, 2017       Who to Call     For medical emergencies, please call 911.  For non-urgent questions about your medical care, please call your primary care provider or clinic, [phone]  For questions related to your surgery, please call your surgery clinic        Attending Provider     Provider Charan Olson MD Plastic Surgery       Primary Care Provider Office Phone # Fax #    Yocasta Moreno [phone]      After Care Instructions     Diet Instructions       Resume pre-procedure diet            Dressing       Light activity.  No lifting over 10lbs.  Ok to loosen ACE bandage if constrictive.  Remove ACE bandage and shower 48 hours after surgery.  Leave sterri strips on.  Wear comfortable bra day and night for 4 weeks following surgery.  It may take several weeks to several months for the shape to develop and stabilize.            Follow up       Follow up with Dr. Lane in approximately 2 weeks.  You may request an appointment on the portal or you may call the office for an appointment - [phone].            Ice to affected area       Ice to operative site PRN            No lifting       No lifting over 10 lbs and no strenuous physical activity for 4 weeks            Shower        No shower for 48 hours post procedure. May shower Postoperative Day (POD)  2                  Further instructions from your care team       While you were at the hospital today you were given 1000 mg of Tylenol. The recommended daily maximum dose is 4000 mg.    Same Day Surgery Discharge Instructions for  Sedation and General Anesthesia       It's not unusual to feel dizzy, light-headed or faint for up to 24 hours after surgery or while taking pain medication.  If you have these symptoms: sit for a few minutes before standing and have someone assist you when you get up to walk or use the bathroom.      You should rest and relax for the next 24 hours. We recommend you make arrangements to have an adult stay with you for at least 24 hours after your discharge.  Avoid hazardous and strenuous activity.      DO NOT DRIVE any vehicle or operate mechanical equipment for 24 hours following the end of your surgery.  Even though you may feel normal, your reactions may be affected by the medication you have received.      Do not drink alcoholic beverages for 24 hours following surgery.       Slowly progress to your regular diet as you feel able. It's not unusual to feel nauseated and/or vomit after receiving anesthesia.  If you develop these symptoms, drink clear liquids (apple juice, ginger ale, broth, 7-up, etc. ) until you feel better.  If your nausea and vomiting persists for 24 hours, please notify your surgeon.        All narcotic pain medications, along with inactivity and anesthesia, can cause constipation. Drinking plenty of liquids and increasing fiber intake will help.      For any questions of a medical nature, call your surgeon.      Do not make important decisions for 24 hours.      If you had general anesthesia, you may have a sore throat for a couple of days related to the breathing tube used during surgery.  You may use Cepacol lozenges to help with this discomfort.  If it worsens or if you develop a fever,  contact your surgeon.       If you feel your pain is not well managed with the pain medications prescribed by your surgeon, please contact your surgeon's office to let them know so they can address your concerns.       Discharge Instructions following Breast Surgery  [facility] Day [facility]    Diet:   Resume diet as tolerated.  Drink plenty of fluids to prevent constipation.    Activity:   Gentle rotation & stretching of your arms/shoulders will prevent stiffness in joints   Increase activity gradually   No heavy lifting greater than 10-15 pounds & no strenuous activity until  approved by surgeon    Bathing/Incision Care:   You may shower as directed by surgeon   Pat incisions dry.  No lotions, powders or perfumes to incisions   Tape dressings (steri strips) will fall off in 7-10 days (if present)    What to expect:   A tingly or itchy sensation around the incision is a normal sign of healing   Some clear, pink drainage from incisions is normal.      Notify your surgeon for the following signs & symptoms:   Redness, warmth, or swelling of the incision    Foul smelling or increased drainage   Chills or temperature greater than 101 F   Pain not controlled by pain medications        Information for Patients Discharging with a Transderm Scopolamine Patch       Dry mouth is a common side effect.    Drowsiness is another common side effect especially when combined with pain medication.  Please avoid activities that require mental alertness such as driving a car or making important legal decisions.    Since Scopolamine can cause temporary dilation of the pupils and blurred vision if it comes in contact with the eyes; be sure to wash your hands thoroughly with soap and water immediately after handling the patch.   When you remove your patch, please stick it to a tissue or paper towel for disposal.      Remove the patch immediately and contact a physician in the unlikely event that you experience symptoms of  "acute glaucoma (pain and reddening of the eyes, accompanied by dilated pupils).    Remove the patch if you develop any difficulties urinating.  If you cannot urinate after removing your patch, please notify your surgeon.    Remove the patch 24 hours after surgery.      Pending Results     Date and Time Order Name Status Description    2017 0912 Surgical pathology exam In process             Admission Information     Date & Time Provider Department Dept. Phone    2017 Charan Lane MD [facility] [phone]      Your Vitals Were     Blood Pressure Pulse Temperature Respirations Height Weight    91/59 66 98.1  F (36.7  C) 12 1.575 m (5' 2\") 69.8 kg (153 lb 14.4 oz)    Last Period Pulse Oximetry BMI (Body Mass Index)             2013 94% 28.15 kg/m2         [employer] Information     [employer] lets you send messages to your doctor, view your test results, renew your prescriptions, schedule appointments and more. To sign up, go to [email or web address] . Click on \"Log in\" on the left side of the screen, which will take you to the Welcome page. Then click on \"Sign up Now\" on the right side of the page.     You will be asked to enter the access code listed below, as well as some personal information. Please follow the directions to create your username and password.     Your access code is: [email or web address]  Expires: 3/7/2018  5:13 PM     Your access code will  in 90 days. If you need help or a new code, please call your [facility] clinic or [phone].        Care EveryWhere ID     This is your Care EveryWhere ID. This could be used by other organizations to access your [facility] medical records  [id number]        Equal Access to Services     KEENAN DAVIS AH: Gerard Mancera, catrina perez, qasosa kaalmada rocio, birgit ponce. So [facility] [phone].    ATENCIÓN: Si habla español, tiene a bejarano disposición servicios gratuitos de " asistencia lingüística. Noemí al [phone].    We comply with applicable federal civil rights laws and [place] laws. We do not discriminate on the basis of race, color, national origin, age, disability, sex, sexual orientation, or gender identity.               Review of your medicines      START taking        Dose / Directions    HYDROcodone-acetaminophen 5-325 MG per tablet   Commonly known as:  NORCO   Used for:  Personal history of malignant neoplasm of breast   Notes to Patient:  One tablet taken at 10:50am        Dose:  1-2 tablet   Take 1-2 tablets by mouth every 4 hours as needed for other (Moderate to Severe Pain)   Quantity:  40 tablet   Refills:  0         CONTINUE these medicines which have NOT CHANGED        Dose / Directions    blood glucose lancets standard   Commonly known as:  no brand specified   Used for:  Type 2 diabetes mellitus with hyperglycemia, without long-term current use of insulin (H)        Use to test blood sugar 4 times daily or as directed. Anticipate decrease in the future.   Quantity:  1 Box   Refills:  0       blood glucose monitoring lancets   Used for:  Type 2 diabetes mellitus with hyperglycemia, without long-term current use of insulin (H)        Use to test blood sugar 2 times daily or as directed.  Ok to substitute alternative if insurance prefers.   Quantity:  1 Box   Refills:  1       blood glucose monitoring meter device kit        Refills:  0       * blood glucose monitoring test strip   Commonly known as:  no brand specified   Used for:  Type 2 diabetes mellitus with hyperglycemia, without long-term current use of insulin (H)        Use to test blood sugars 4 times daily or as directed. Anticipate decreasing in the future.   Quantity:  100 strip   Refills:  prn       * blood glucose monitoring test strip   Commonly known as:  JOLLY CONTOUR NEXT   Used for:  Type 2 diabetes mellitus with hyperglycemia, without long-term current use of insulin (H)        Use to test  blood sugar 4 times daily or as directed.  Ok to substitute alternative if insurance prefers.   Quantity:  200 strip   Refills:  1       metFORMIN 1000 MG tablet   Commonly known as:  GLUCOPHAGE   Used for:  Type 2 diabetes mellitus with hyperglycemia, without long-term current use of insulin (H)        Dose:  1000 mg   Take 1 tablet (1,000 mg) by mouth 2 times daily (with meals)   Quantity:  180 tablet   Refills:  1       STATIN NOT PRESCRIBED (INTENTIONAL)   Used for:  Hyperlipidemia LDL goal <100        Please choose reason not prescribed, below   Refills:  0       * Notice:  This list has 2 medication(s) that are the same as other medications prescribed for you. Read the directions carefully, and ask your doctor or other care provider to review them with you.      STOP taking     aspirin 81 MG chewable tablet                Where to get your medicines      Some of these will need a paper prescription and others can be bought over the counter. Ask your nurse if you have questions.     Bring a paper prescription for each of these medications     HYDROcodone-acetaminophen 5-325 MG per tablet                Protect others around you: Learn how to safely use, store and throw away your medicines at www.disposemymeds.org.             Medication List: This is a list of all your medications and when to take them. Check marks below indicate your daily home schedule. Keep this list as a reference.      Medications           Morning Afternoon Evening Bedtime As Needed    blood glucose lancets standard   Commonly known as:  no brand specified   Use to test blood sugar 4 times daily or as directed. Anticipate decrease in the future.                                blood glucose monitoring lancets   Use to test blood sugar 2 times daily or as directed.  Ok to substitute alternative if insurance prefers.                                blood glucose monitoring meter device kit                                * blood glucose  monitoring test strip   Commonly known as:  no brand specified   Use to test blood sugars 4 times daily or as directed. Anticipate decreasing in the future.                                * blood glucose monitoring test strip   Commonly known as:  JOLLY CONTOUR NEXT   Use to test blood sugar 4 times daily or as directed.  Ok to substitute alternative if insurance prefers.                                HYDROcodone-acetaminophen 5-325 MG per tablet   Commonly known as:  NORCO   Take 1-2 tablets by mouth every 4 hours as needed for other (Moderate to Severe Pain)   Last time this was given:  1 tablet on 12/28/2017 10:49 AM   Notes to Patient:  One tablet taken at 10:50am                                metFORMIN 1000 MG tablet   Commonly known as:  GLUCOPHAGE   Take 1 tablet (1,000 mg) by mouth 2 times daily (with meals)                                STATIN NOT PRESCRIBED (INTENTIONAL)   Please choose reason not prescribed, below                                * Notice:  This list has 2 medication(s) that are the same as other medications prescribed for you. Read the directions carefully, and ask your doctor or other care provider to review them with you.

## 2017-12-28 NOTE — ANESTHESIA CARE TRANSFER NOTE
Patient: Teresa Hahn    Procedure(s):  LEFT BREAST REDUCTION MAMMOPLASTY ; RIGHT BREAST REVISION TRAM FLAP  - Wound Class: I-Clean   - Wound Class: I-Clean    Diagnosis: status post right mastectomy   Diagnosis Additional Information: No value filed.    Anesthesia Type:   General     Note:  Airway :Face Mask  Patient transferred to:PACU  Handoff Report: Identifed the Patient, Identified the Reponsible Provider, Reviewed the pertinent medical history, Discussed the surgical course, Reviewed Intra-OP anesthesia mangement and issues during anesthesia, Set expectations for post-procedure period and Allowed opportunity for questions and acknowledgement of understanding   LMA removed upon arrival to PACU, PT. Vinod, good exchange, report to RN    Vitals: (Last set prior to Anesthesia Care Transfer)    CRNA VITALS  12/28/2017 0905 - 12/28/2017 0942      12/28/2017             Pulse: 94    SpO2: 97 %    Resp Rate (set): 10          BP: 127/62  P: 90  SaO2 99        Electronically Signed By: CARL Renee CRNA  December 28, 2017  9:42 AM

## 2017-12-28 NOTE — DISCHARGE INSTRUCTIONS
While you were at the hospital today you were given 1000 mg of Tylenol. The recommended daily maximum dose is 4000 mg.    Same Day Surgery Discharge Instructions for  Sedation and General Anesthesia       It's not unusual to feel dizzy, light-headed or faint for up to 24 hours after surgery or while taking pain medication.  If you have these symptoms: sit for a few minutes before standing and have someone assist you when you get up to walk or use the bathroom.      You should rest and relax for the next 24 hours. We recommend you make arrangements to have an adult stay with you for at least 24 hours after your discharge.  Avoid hazardous and strenuous activity.      DO NOT DRIVE any vehicle or operate mechanical equipment for 24 hours following the end of your surgery.  Even though you may feel normal, your reactions may be affected by the medication you have received.      Do not drink alcoholic beverages for 24 hours following surgery.       Slowly progress to your regular diet as you feel able. It's not unusual to feel nauseated and/or vomit after receiving anesthesia.  If you develop these symptoms, drink clear liquids (apple juice, ginger ale, broth, 7-up, etc. ) until you feel better.  If your nausea and vomiting persists for 24 hours, please notify your surgeon.        All narcotic pain medications, along with inactivity and anesthesia, can cause constipation. Drinking plenty of liquids and increasing fiber intake will help.      For any questions of a medical nature, call your surgeon.      Do not make important decisions for 24 hours.      If you had general anesthesia, you may have a sore throat for a couple of days related to the breathing tube used during surgery.  You may use Cepacol lozenges to help with this discomfort.  If it worsens or if you develop a fever, contact your surgeon.       If you feel your pain is not well managed with the pain medications prescribed by your surgeon, please contact  your surgeon's office to let them know so they can address your concerns.       Discharge Instructions following Breast Surgery  [facility] Same Day Surgery    Diet:   Resume diet as tolerated.  Drink plenty of fluids to prevent constipation.    Activity:   Gentle rotation & stretching of your arms/shoulders will prevent stiffness in joints   Increase activity gradually   No heavy lifting greater than 10-15 pounds & no strenuous activity until  approved by surgeon    Bathing/Incision Care:   You may shower as directed by surgeon   Pat incisions dry.  No lotions, powders or perfumes to incisions   Tape dressings (steri strips) will fall off in 7-10 days (if present)    What to expect:   A tingly or itchy sensation around the incision is a normal sign of healing   Some clear, pink drainage from incisions is normal.      Notify your surgeon for the following signs & symptoms:   Redness, warmth, or swelling of the incision    Foul smelling or increased drainage   Chills or temperature greater than 101 F   Pain not controlled by pain medications        Information for Patients Discharging with a Transderm Scopolamine Patch       Dry mouth is a common side effect.    Drowsiness is another common side effect especially when combined with pain medication.  Please avoid activities that require mental alertness such as driving a car or making important legal decisions.    Since Scopolamine can cause temporary dilation of the pupils and blurred vision if it comes in contact with the eyes; be sure to wash your hands thoroughly with soap and water immediately after handling the patch.   When you remove your patch, please stick it to a tissue or paper towel for disposal.      Remove the patch immediately and contact a physician in the unlikely event that you experience symptoms of acute glaucoma (pain and reddening of the eyes, accompanied by dilated pupils).    Remove the patch if you develop any difficulties urinating.   If you cannot urinate after removing your patch, please notify your surgeon.    Remove the patch 24 hours after surgery.

## 2017-12-28 NOTE — IP AVS SNAPSHOT
[facility]    [address]    [place] [address]    Phone:  [phone]    Fax:  [phone]                                       After Visit Summary   12/28/2017    Teresa Hahn    MRN: [id number]           After Visit Summary Signature Page     I have received my discharge instructions, and my questions have been answered. I have discussed any challenges I see with this plan with the nurse or doctor.    ..........................................................................................................................................  Patient/Patient Representative Signature      ..........................................................................................................................................  Patient Representative Print Name and Relationship to Patient    ..................................................               ................................................  Date                                            Time    ..........................................................................................................................................  Reviewed by Signature/Title    ...................................................              ..............................................  Date                                                            Time

## 2017-12-28 NOTE — OP NOTE
Date of Service: 12/28/17    PREOPERATIVE DIAGNOSES:      History of breast cancer   Absence of right breast   Deformity of right breast reconstruction   Disproportion of native and reconstructed breasts    POSTOPERATIVE DIAGNOSES:    same    PROCEDURES:     Revision of right breast reconstruction  Left reduction mammaplasty    SURGEON: Charan Lane MD     ANESTHESIA: General endotracheal anesthesia    COMPLICATIONS: None.     ESTIMATED BLOOD LOSS: 20 cc    DISPOSITION: To the Post Anesthesia Care Unit in stable condition.     TUBES AND DRAINS: none    COUNTS: Correct     SPECIMENS: breast tissue to pathology 105 g left    INDICATIONS:      This is a 58 yo female who presents for a revision of her breast reconstruction and a balancing breast reduction.  We discussed details, risks, benefits, and alternatives of the procedure.  She understands limitations and risks including bleeding, hematoma, seroma that could require surgical evacuation. She understands or could be a need for unplanned surgical revision.  We discussed there is no guarantee of cure or protection from benign or malignant breast or skin disease.   We discussed that there can be asymmetry, partial or total necrosis of the nipple-areola complex, numbness that may include the nipple-areola complex, scarring which may be hypertrophic, keloid or non-cosmetic.  She understands there may be difficulty breast feeding in the future and that I cannot guarantee a cup size.  She voiced understanding, signed consent, elected to proceed.    DESCRIPTION OF PROCEDURE:      She was marked preoperatively in the upright position.  A modified Barbosa-pattern was designed with a superio-medial pedicle on the left side.  The nipple was marked at the level of the center of the skin island that is present on the right reconstructed side.  This measured 23 cm from the sternal notch.  The distance from the center of the skin island to the inframammary fold on stretch was  9 cm, so the vertical limbs were planned at this distance.   She was taken to the operating room and placed supine.  Pressure points were padded and sequential compression devices were placed.  She was given IV anesthesia and IV antibiotic by the anesthesia staff.  LMA anesthesia was used.  She was prepped in the standard fashion and a timeout was performed to ensure the correct orientation and procedure.    I began on the right side with the first aspect of the revision of the reconstructed breast.  I marked a 42 mm Barrow in the center of the skin island on the right.  I then created a larger concentric marking around the scar of the skin island.  This was injected with 1/4% marcaine with epinephrine.  I de-epithelialized the donut of skin around the skin island.  I then undermined the surrounding mastectomy skin over the autologous reconstruction.  I then closed this with deep dermal 3-0 monocryl and running 4-0 monocryl to create a smaller diameter skin island.   I then injected the medial horizontal scar on the right reconstructed breast with 1/4% marcaine with epinephrine.  I made the incision just below the old scar and elevated a skin flap.  I removed a bulge of excess tissue here and discarded it.  I sculpted the fat and removed a small amount of skin.  This was closed with a 3-point stitch of 2-0 PDS between the chest wall and the superficial fascia.  I then closed the skin with deep dermal 3-0 monocryl and running 4-0 monocryl subcuticular stitching.    I turned my attention to the left side.  The areola was marked under stretch at a 42 mm diameter.   I injected my proposed incisions with 1/4 % marcaine with epinephrine.  I scored my incisions and de-epithelialized the superio-medial pedicle.  I incised down towards the chest wall without exposing the pectoralis fascia to mobilize the pedicle. I maintained a lower, central mound of breast tissue for added volume that was de-epithelialized.  I removed skin  and a small amount of breast tissue medially and laterally.  The tissue weighed 105 g and was sent for permanent sectioning.  I then sutured the central mound of tissue superiorly on the chest wall for added projection.     The areola was rotated to the new height and vertical pillars were re-approximated with interrupted 2-0 PDS.  Hemostasis was ensured and the pocket was irrigated with normal saline. The vertical and horizontal skin closures were completed with interrupted 3-0 monocryl and running 4-0 monocryl subcuticular stitching.  The areola was inset into a 42 mm diameter opening and inset was done with interrupted 3-0 monocryl and running 4-0 monocryl subcuticular stitching.    Good shape and symmetry was achieved with the patient in the upright position.  Mastisol and Steri-Strips were applied to the incisions.  Sterile Kerlix gauze and ABD pads were placed over the breasts.  She was wrapped comfortably with a double 6- inch Ace bandage, awoken from anesthesia, and taken to the PACU in stable condition.

## 2017-12-28 NOTE — ANESTHESIA POSTPROCEDURE EVALUATION
Patient: Teresa Hahn    Procedure(s):  LEFT BREAST REDUCTION MAMMOPLASTY ; RIGHT BREAST REVISION TRAM FLAP  - Wound Class: I-Clean   - Wound Class: I-Clean    Diagnosis:status post right mastectomy   Diagnosis Additional Information: No value filed.    Anesthesia Type:  General    Note:  Anesthesia Post Evaluation    Patient location during evaluation: PACU  Patient participation: Able to fully participate in evaluation  Level of consciousness: awake  Pain management: adequate  Airway patency: patent  Cardiovascular status: acceptable  Respiratory status: acceptable  Hydration status: acceptable  PONV: controlled     Anesthetic complications: None          Last vitals:  Vitals:    12/28/17 1015 12/28/17 1030 12/28/17 1045   BP: 104/58 91/59 101/61   Pulse:      Resp: 14 12 11   Temp: 36.6  C (97.9  F) 36.7  C (98.1  F) 36.9  C (98.4  F)   SpO2: 91% 94% 94%         Electronically Signed By: Ranjan Roberts MD  December 28, 2017  11:23 AM

## 2017-12-29 LAB — COPATH REPORT: NORMAL

## 2018-01-13 DIAGNOSIS — E11.65 TYPE 2 DIABETES MELLITUS WITH HYPERGLYCEMIA, WITHOUT LONG-TERM CURRENT USE OF INSULIN (H): ICD-10-CM

## 2018-01-13 NOTE — TELEPHONE ENCOUNTER
"Requested Prescriptions   Pending Prescriptions Disp Refills     metFORMIN (GLUCOPHAGE) 1000 MG tablet [Pharmacy Med Name: METFORMIN HCL 1,000 MG TABLET]  Last Written Prescription Date:  07/17/2017  Last Fill Quantity: 180,  # refills: 1   Last Office Visit with [facility], [facility] or [facility] prescribing provider:  12/18/2017   Future Office Visit:      180 tablet 1     Sig: TAKE 1 TABLET BY MOUTH TWICE A DAY WITH MEALS    Biguanide Agents Passed    1/13/2018  2:05 AM       Passed - Patient's BP is less than 140/90    BP Readings from Last 3 Encounters:   12/28/17 101/68   12/18/17 108/68   12/07/17 118/80                Passed - Patient has documented LDL within the past 12 mos.    Recent Labs   Lab Test  11/28/17   0904   LDL  112*            Passed - Patient has had a Microalbumin in the past 12 mos.    Recent Labs   Lab Test  07/11/17   0941   MICROL  11   UMALCR  6.75            Passed - Patient is age 10 or older       Passed - Patient has documented A1c within the specified period of time.    Recent Labs   Lab Test  11/28/17   0904   A1C  6.2*            Passed - Patient's CR is NOT>1.4 OR Patient's EGFR is NOT<45 within past 12 mos.    Recent Labs   Lab Test  12/18/17   1410   GFRESTIMATED  >90   GFRESTBLACK  >90       Recent Labs   Lab Test  12/18/17   1410   CR  0.61            Passed - Patient does NOT have a diagnosis of CHF.       Passed - Patient is not pregnant       Passed - Patient has not had a positive pregnancy test within the past 12 mos.        Passed - Recent (6 mos) or future visit with authorizing provider's specialty    Patient had office visit in the last 6 months or has a visit in the next 30 days with authorizing provider.  See \"Patient Info\" tab in inbasket, or \"Choose Columns\" in Meds & Orders section of the refill encounter.             "

## 2018-01-17 NOTE — TELEPHONE ENCOUNTER
Routing refill request to provider for review/approval because:  Labs out of range:  See below. A1C and LDL    Med pended.     Catie De La Paz RN -- [facility]

## 2018-01-27 ENCOUNTER — NURSE TRIAGE (OUTPATIENT)
Dept: NURSING | Facility: CLINIC | Age: 58
End: 2018-01-27

## 2018-01-27 ENCOUNTER — TELEPHONE (OUTPATIENT)
Dept: PEDIATRICS | Facility: CLINIC | Age: 58
End: 2018-01-27

## 2018-01-27 DIAGNOSIS — E11.65 TYPE 2 DIABETES MELLITUS WITH HYPERGLYCEMIA, WITHOUT LONG-TERM CURRENT USE OF INSULIN (H): ICD-10-CM

## 2018-01-27 NOTE — TELEPHONE ENCOUNTER
"  Additional Information    Caller has medication question only, adult not sick, and triager answers question     \"I would like my PCP to write out a hard copy of my RX metFORMIN (GLUCOPHAGE) 1000 MG tablet 180 tablet 1 1/17/2018  No  Sig: TAKE 1 TABLET BY MOUTH TWICE A DAY WITH MEALS    I would like to pick it up before the end of next week.\" I will route request to PCP.    Protocols used: MEDICATION QUESTION CALL-ADULT-    "

## 2018-01-29 NOTE — TELEPHONE ENCOUNTER
This was sent electronically on 1/17.  The pharmacy this was sent to is not covered, so is going to be   Finding out where she can bring it to be filled. Would like to   A copy of it today. This is printed and left at the  for her to .    Tracie Quarles, RN  Triage Nurse

## 2018-02-01 ENCOUNTER — TRANSFERRED RECORDS (OUTPATIENT)
Dept: HEALTH INFORMATION MANAGEMENT | Facility: CLINIC | Age: 58
End: 2018-02-01

## 2018-03-01 ENCOUNTER — TRANSFERRED RECORDS (OUTPATIENT)
Dept: HEALTH INFORMATION MANAGEMENT | Facility: CLINIC | Age: 58
End: 2018-03-01

## 2018-03-02 ENCOUNTER — TRANSFERRED RECORDS (OUTPATIENT)
Dept: HEALTH INFORMATION MANAGEMENT | Facility: CLINIC | Age: 58
End: 2018-03-02

## 2018-04-12 ENCOUNTER — TRANSFERRED RECORDS (OUTPATIENT)
Dept: HEALTH INFORMATION MANAGEMENT | Facility: CLINIC | Age: 58
End: 2018-04-12

## 2018-06-26 ENCOUNTER — TRANSFERRED RECORDS (OUTPATIENT)
Dept: HEALTH INFORMATION MANAGEMENT | Facility: CLINIC | Age: 58
End: 2018-06-26

## 2018-07-11 ENCOUNTER — TELEPHONE (OUTPATIENT)
Dept: FAMILY MEDICINE | Facility: CLINIC | Age: 58
End: 2018-07-11

## 2018-07-11 DIAGNOSIS — E78.5 HYPERLIPIDEMIA LDL GOAL <100: ICD-10-CM

## 2018-07-11 DIAGNOSIS — E11.65 TYPE 2 DIABETES MELLITUS WITH HYPERGLYCEMIA, WITHOUT LONG-TERM CURRENT USE OF INSULIN (H): Primary | ICD-10-CM

## 2018-07-11 NOTE — TELEPHONE ENCOUNTER
Called the pt back.    She has an appt next Thursday with Dr. Moreno.    Do you want to have her have a fasting lab prior to her med check?  Blake'd up lab work - edit as you wish.  Please let her know what labs are requested.  She would like any labs done prior to the ov.      She says she is to have a colonoscopy more frequently than every 5 years due to polyps.  She wants an order sent to [facility] in [place].  Advised that we typically would not  refer to [facility] since we are [place].  We can certainly refer to someone in our network.  She is going to check with her oncologist to see if he can refer to [facility] in [place] for the colonoscopy.  She will let us know if she has any problems.

## 2018-07-11 NOTE — TELEPHONE ENCOUNTER
Patient would like to discuss possible need for lab work prior to appt with PCP on 7/19/18 (lipids) and ordering a colonoscopy. Please return call to patient.  -Katie Galaviz

## 2018-07-11 NOTE — TELEPHONE ENCOUNTER
Signed the orders...  It would actually be great if she could do them a couple days in advance so we would have all results...    Reviewed chart regarding colonoscopy.  She had one in [place] in 2012. It said 5 years if polyps were adenomas and 10 if not.  I could not find the path report; but in Care Everywhere, there is a notation from [facility] that it was hyperplastic and it would be 10 years....    When she is here, I can show her what we have. We may need more information...          Lab Results   Component Value Date    A1C 6.2 11/28/2017    A1C 5.9 07/11/2017    A1C 9.2 03/17/2017

## 2018-07-13 DIAGNOSIS — E11.65 TYPE 2 DIABETES MELLITUS WITH HYPERGLYCEMIA, WITHOUT LONG-TERM CURRENT USE OF INSULIN (H): ICD-10-CM

## 2018-07-13 DIAGNOSIS — E78.5 HYPERLIPIDEMIA LDL GOAL <100: ICD-10-CM

## 2018-07-13 LAB
CHOLEST SERPL-MCNC: 201 MG/DL
CREAT UR-MCNC: 134 MG/DL
HBA1C MFR BLD: 6.4 % (ref 0–5.6)
HDLC SERPL-MCNC: 35 MG/DL
LDLC SERPL CALC-MCNC: 113 MG/DL
MICROALBUMIN UR-MCNC: 6 MG/L
MICROALBUMIN/CREAT UR: 4.6 MG/G CR (ref 0–25)
NONHDLC SERPL-MCNC: 166 MG/DL
TRIGL SERPL-MCNC: 265 MG/DL
TSH SERPL DL<=0.005 MIU/L-ACNC: 1.76 MU/L (ref 0.4–4)

## 2018-07-13 PROCEDURE — 82043 UR ALBUMIN QUANTITATIVE: CPT | Performed by: FAMILY MEDICINE

## 2018-07-13 PROCEDURE — 84443 ASSAY THYROID STIM HORMONE: CPT | Performed by: FAMILY MEDICINE

## 2018-07-13 PROCEDURE — 80061 LIPID PANEL: CPT | Performed by: FAMILY MEDICINE

## 2018-07-13 PROCEDURE — 36415 COLL VENOUS BLD VENIPUNCTURE: CPT | Performed by: FAMILY MEDICINE

## 2018-07-13 PROCEDURE — 83036 HEMOGLOBIN GLYCOSYLATED A1C: CPT | Performed by: FAMILY MEDICINE

## 2018-07-19 ENCOUNTER — OFFICE VISIT (OUTPATIENT)
Dept: FAMILY MEDICINE | Facility: CLINIC | Age: 58
End: 2018-07-19
Payer: COMMERCIAL

## 2018-07-19 VITALS
HEART RATE: 98 BPM | OXYGEN SATURATION: 95 % | TEMPERATURE: 98.2 F | DIASTOLIC BLOOD PRESSURE: 70 MMHG | BODY MASS INDEX: 29.72 KG/M2 | SYSTOLIC BLOOD PRESSURE: 110 MMHG | HEIGHT: 62 IN | WEIGHT: 161.5 LBS | RESPIRATION RATE: 16 BRPM

## 2018-07-19 DIAGNOSIS — F32.5 MAJOR DEPRESSIVE DISORDER, SINGLE EPISODE IN FULL REMISSION (H): ICD-10-CM

## 2018-07-19 DIAGNOSIS — E11.9 TYPE 2 DIABETES MELLITUS WITHOUT COMPLICATION, WITHOUT LONG-TERM CURRENT USE OF INSULIN (H): Primary | ICD-10-CM

## 2018-07-19 DIAGNOSIS — Z17.0 MALIGNANT NEOPLASM OF UPPER-INNER QUADRANT OF RIGHT BREAST IN FEMALE, ESTROGEN RECEPTOR POSITIVE (H): ICD-10-CM

## 2018-07-19 DIAGNOSIS — E78.5 HYPERLIPIDEMIA LDL GOAL <100: ICD-10-CM

## 2018-07-19 DIAGNOSIS — F41.9 ANXIETY: ICD-10-CM

## 2018-07-19 DIAGNOSIS — C50.211 MALIGNANT NEOPLASM OF UPPER-INNER QUADRANT OF RIGHT BREAST IN FEMALE, ESTROGEN RECEPTOR POSITIVE (H): ICD-10-CM

## 2018-07-19 DIAGNOSIS — F43.9 SITUATIONAL STRESS: ICD-10-CM

## 2018-07-19 PROBLEM — E11.65 TYPE 2 DIABETES MELLITUS WITH HYPERGLYCEMIA, WITHOUT LONG-TERM CURRENT USE OF INSULIN (H): Status: RESOLVED | Noted: 2017-03-21 | Resolved: 2018-07-19

## 2018-07-19 PROCEDURE — 99207 C FOOT EXAM  NO CHARGE: CPT | Performed by: FAMILY MEDICINE

## 2018-07-19 PROCEDURE — 99214 OFFICE O/P EST MOD 30 MIN: CPT | Performed by: FAMILY MEDICINE

## 2018-07-19 ASSESSMENT — ANXIETY QUESTIONNAIRES
2. NOT BEING ABLE TO STOP OR CONTROL WORRYING: NEARLY EVERY DAY
3. WORRYING TOO MUCH ABOUT DIFFERENT THINGS: NEARLY EVERY DAY
5. BEING SO RESTLESS THAT IT IS HARD TO SIT STILL: NOT AT ALL
6. BECOMING EASILY ANNOYED OR IRRITABLE: SEVERAL DAYS
1. FEELING NERVOUS, ANXIOUS, OR ON EDGE: NEARLY EVERY DAY
7. FEELING AFRAID AS IF SOMETHING AWFUL MIGHT HAPPEN: NEARLY EVERY DAY
GAD7 TOTAL SCORE: 15

## 2018-07-19 ASSESSMENT — PATIENT HEALTH QUESTIONNAIRE - PHQ9: 5. POOR APPETITE OR OVEREATING: MORE THAN HALF THE DAYS

## 2018-07-19 NOTE — PATIENT INSTRUCTIONS
If you would like to consider starting a statin earlier than your next appointment, let me know.

## 2018-07-19 NOTE — LETTER
My Depression Action Plan  Name: Teresa Hahn   Date of Birth 1960  Date: 7/19/2018    My doctor: Yocasta Moreno   My clinic: [facility]  [id number] [place] [id number] [phone]          GREEN    ZONE   Good Control    What it looks like:     Things are going generally well. You have normal up s and down s. You may even feel depressed from time to time, but bad moods usually last less than a day.   What you need to do:  1. Continue to care for yourself (see self care plan)  2. Check your depression survival kit and update it as needed  3. Follow your physician s recommendations including any medication.  4. Do not stop taking medication unless you consult with your physician first.           YELLOW         ZONE Getting Worse    What it looks like:     Depression is starting to interfere with your life.     It may be hard to get out of bed; you may be starting to isolate yourself from others.    Symptoms of depression are starting to last most all day and this has happened for several days.     You may have suicidal thoughts but they are not constant.   What you need to do:     1. Call your care team, your response to treatment will improve if you keep your care team informed of your progress. Yellow periods are signs an adjustment may need to be made.     2. Continue your self-care, even if you have to fake it!    3. Talk to someone in your support network    4. Open up your depression survival kit           RED    ZONE Medical Alert - Get Help    What it looks like:     Depression is seriously interfering with your life.     You may experience these or other symptoms: You can t get out of bed most days, can t work or engage in other necessary activities, you have trouble taking care of basic hygiene, or basic responsibilities, thoughts of suicide or death that will not go away, self-injurious behavior.     What you need to do:  1. Call your care team and request  a same-day appointment. If they are not available (weekends or after hours) call your local crisis line, emergency room or 911.            Depression Self Care Plan / Survival Kit    Self-Care for Depression  Here s the deal. Your body and mind are really not as separate as most people think.  What you do and think affects how you feel and how you feel influences what you do and think. This means if you do things that people who feel good do, it will help you feel better.  Sometimes this is all it takes.  There is also a place for medication and therapy depending on how severe your depression is, so be sure to consult with your medical provider and/ or Behavioral Health Consultant if your symptoms are worsening or not improving.     In order to better manage my stress, I will:    Exercise  Get some form of exercise, every day. This will help reduce pain and release endorphins, the  feel good  chemicals in your brain. This is almost as good as taking antidepressants!  This is not the same as joining a gym and then never going! (they count on that by the way ) It can be as simple as just going for a walk or doing some gardening, anything that will get you moving.      Hygiene   Maintain good hygiene (Get out of bed in the morning, Make your bed, Brush your teeth, Take a shower, and Get dressed like you were going to work, even if you are unemployed).  If your clothes don't fit try to get ones that do.    Diet  I will strive to eat foods that are good for me, drink plenty of water, and avoid excessive sugar, caffeine, alcohol, and other mood-altering substances.  Some foods that are helpful in depression are: complex carbohydrates, B vitamins, flaxseed, fish or fish oil, fresh fruits and vegetables.    Psychotherapy  I agree to participate in Individual Therapy (if recommended).    Medication  If prescribed medications, I agree to take them.  Missing doses can result in serious side effects.  I understand that drinking  alcohol, or other illicit drug use, may cause potential side effects.  I will not stop my medication abruptly without first discussing it with my provider.    Staying Connected With Others  I will stay in touch with my friends, family members, and my primary care provider/team.    Use your imagination  Be creative.  We all have a creative side; it doesn t matter if it s oil painting, sand castles, or mud pies! This will also kick up the endorphins.    Witness Beauty  (AKA stop and smell the roses) Take a look outside, even in mid-winter. Notice colors, textures. Watch the squirrels and birds.     Service to others  Be of service to others.  There is always someone else in need.  By helping others we can  get out of ourselves  and remember the really important things.  This also provides opportunities for practicing all the other parts of the program.    Humor  Laugh and be silly!  Adjust your TV habits for less news and crime-drama and more comedy.    Control your stress  Try breathing deep, massage therapy, biofeedback, and meditation. Find time to relax each day.     My support system    Clinic Contact:  Phone number:    Contact 1:  Phone number:    Contact 2:  Phone number:    Anabaptist/:  Phone number:    Therapist:  Phone number:    Local crisis center:    Phone number:    Other community support:  Phone number:

## 2018-07-19 NOTE — PROGRESS NOTES
"  SUBJECTIVE:   Teresa Hahn is a 57 year old female who presents to clinic today for the following health issues:      Diabetes Follow-up      Patient is checking blood sugars: not at all    Diabetic concerns: None     Symptoms of hypoglycemia (low blood sugar): none     Paresthesias (numbness or burning in feet) or sores: No- does have tingling     Date of last diabetic eye exam: 03/01/2018    BP Readings from Last 2 Encounters:   12/28/17 101/68   12/18/17 108/68     Hemoglobin A1C (%)   Date Value   07/13/2018 6.4 (H)   11/28/2017 6.2 (H)     LDL Cholesterol Calculated (mg/dL)   Date Value   07/13/2018 113 (H)   11/28/2017 112 (H)       Diabetes Management Resources  Hyperlipidemia Follow-Up      Rate your low fat/cholesterol diet?: not monitoring fat-\"is aware of it and making health choices\"    Taking statin?  No    Other lipid medications/supplements?:  none      Amount of exercise or physical activity: none- \"mindful and making health choices\"    Problems taking medications regularly: No    Medication side effects: diarrhea     Diet: regular (no restrictions)            Problem list and histories reviewed & adjusted, as indicated.  Additional history:     See under ROS    Patient Active Problem List   Diagnosis     Acute reaction to stress     Arthropathy     GERD (gastroesophageal reflux disease)     Anxiety     Health Care Home     TBI (traumatic brain injury) (H)     ADD (attention deficit disorder)     Major depressive disorder, single episode in full remission (H)     Malignant neoplasm of right female breast (H)     Type 2 diabetes mellitus with hyperglycemia, without long-term current use of insulin (H)     Hyperlipidemia LDL goal <100       Current Outpatient Prescriptions   Medication Sig Dispense Refill     metFORMIN (GLUCOPHAGE) 1000 MG tablet TAKE 1 TABLET BY MOUTH TWICE A DAY WITH MEALS 180 tablet 1     STATIN NOT PRESCRIBED, INTENTIONAL, Please choose reason not prescribed, below       blood " "glucose (NO BRAND SPECIFIED) lancets standard Use to test blood sugar 4 times daily or as directed. Anticipate decrease in the future. (Patient not taking: Reported on 7/19/2018) 1 Box 0     blood glucose monitoring (ACCU-CHEK FASTCLIX) lancets Use to test blood sugar 2 times daily or as directed.  Ok to substitute alternative if insurance prefers. (Patient not taking: Reported on 7/19/2018) 1 Box 1     blood glucose monitoring (ACCU-CHEK SHERYL SMARTVIEW) meter device kit   0     blood glucose monitoring (JOLLY CONTOUR NEXT) test strip Use to test blood sugar 4 times daily or as directed.  Ok to substitute alternative if insurance prefers. (Patient not taking: Reported on 7/19/2018) 200 strip 1     blood glucose monitoring (NO BRAND SPECIFIED) test strip Use to test blood sugars 4 times daily or as directed. Anticipate decreasing in the future. (Patient not taking: Reported on 7/19/2018) 100 strip prn         Reviewed and updated as needed this visit by clinical staff       Reviewed and updated as needed this visit by Provider         ROS:  CONSTITUTIONAL:NEGATIVE for fever, chills, change in weight  RESP:NEGATIVE for significant cough or SOB  CV: NEGATIVE for chest pain, palpitations or peripheral edema  PSYCHIATRIC:   Stressors.   Mother passed;  unemployed. Father in [facility].     Started aromatase inhibitor March 1.   Seems to be tolerating; wants to adjust to this longer before making additional changes.       OBJECTIVE:     /70 (BP Location: Right arm, Cuff Size: Adult Regular)  Pulse 98  Temp 98.2  F (36.8  C) (Oral)  Resp 16  Ht 5' 2\" (1.575 m)  Wt 161 lb 8 oz (73.3 kg)  LMP 11/18/2013  SpO2 95%  BMI 29.54 kg/m2  Body mass index is 29.54 kg/(m^2).  GENERAL APPEARANCE: alert and no distress  RESP: lungs clear to auscultation - no rales, rhonchi or wheezes  CV: regular rates and rhythm  MS: no edema.   PSYCH: mentation appears normal and affect normal/bright    DP pulse " present bilaterally.  No sores or ulcerations.  Little callous.  Monofilament exam normal.    PHQ-9 SCORE 2/16/2017 7/11/2017 7/19/2018   Total Score - - -   Total Score MyChart - - -   Total Score 1 0 1       CHAKA-7 SCORE 2/16/2017 7/11/2017 7/19/2018   Total Score - - -   Total Score - - -   Total Score 2 3 15           Lab Results   Component Value Date    A1C 6.4 07/13/2018    A1C 6.2 11/28/2017    A1C 5.9 07/11/2017    A1C 9.2 03/17/2017       Recent Labs   Lab Test  07/13/18   0810  11/28/17   0904   CHOL  201*  187   HDL  35*  45*   LDL  113*  112*   TRIG  265*  152*     GFR Estimate   Date Value Ref Range Status   12/18/2017 >90 >60 mL/min/1.7m2 Final     Comment:     Non  GFR Calc   03/17/2017 >60 >60 ml/min/1.73m2 Final   11/05/2010 75 >60 mL/min/1.7m2 Final     Component      Latest Ref Rng & Units 7/13/2018   Creatinine Urine      mg/dL 134   Albumin Urine mg/L      mg/L 6   Albumin Urine mg/g Cr      0 - 25 mg/g Cr 4.60   TSH      0.40 - 4.00 mU/L 1.76             ASSESSMENT/PLAN:     Type 2 diabetes mellitus without complication, without long-term current use of insulin (H)  Meeting goal. Continue to follow. Discussed foot care.  - FOOT EXAM  - metFORMIN (GLUCOPHAGE) 1000 MG tablet; TAKE 1 TABLET BY MOUTH TWICE A DAY WITH MEALS  - ACE/ARB/ARNI NOT PRESCRIBED, INTENTIONAL,; Please choose reason not prescribed, below    Malignant neoplasm of upper-inner quadrant of right breast in female, estrogen receptor positive (H)  Recently started aromatase inhibitor. Tolerating.   Continue to follow.     Major depressive disorder, single episode in full remission (H)  And stressors. Now with anxiety.    Anxiety:  She notes these are from stressors.  Does not want to consider medication.   Mentioned counseling. Discussed some relaxation things.     Hyperlipidemia LDL goal <100  Defers statin at this time; does not want to add another new medication. Wants to give more time to adjust to aromatase  inhibitor.     Situational stress  As above.          Yocasta Moreno MD, MD  [facility]

## 2018-07-19 NOTE — MR AVS SNAPSHOT
"              After Visit Summary   7/19/2018    Teresa Hahn    MRN: [id number]           Patient Information     Date Of Birth          1960        Visit Information        Provider Department      7/19/2018 2:30 PM Yocasta Moreno MD [facility]        Today's Diagnoses     Type 2 diabetes mellitus with hyperglycemia, without long-term current use of insulin (H)          Care Instructions          If you would like to consider starting a statin earlier than your next appointment, let me know.              Follow-ups after your visit        Who to contact     If you have questions or need follow up information about today's clinic visit or your schedule please contact [facility] directly at [phone].  Normal or non-critical lab and imaging results will be communicated to you by MyChart, letter or phone within 4 business days after the clinic has received the results. If you do not hear from us within 7 days, please contact the clinic through MyChart or phone. If you have a critical or abnormal lab result, we will notify you by phone as soon as possible.  Submit refill requests through [employer] or call your pharmacy and they will forward the refill request to us. Please allow 3 business days for your refill to be completed.          Additional Information About Your Visit        Care EveryWhere ID     This is your Care EveryWhere ID. This could be used by other organizations to access your [facility] medical records  [id number]        Your Vitals Were     Pulse Temperature Respirations Height Last Period Pulse Oximetry    98 98.2  F (36.8  C) (Oral) 16 5' 2\" (1.575 m) 11/18/2013 95%    BMI (Body Mass Index)                   29.54 kg/m2            Blood Pressure from Last 3 Encounters:   07/19/18 110/70   12/28/17 101/68   12/18/17 108/68    Weight from Last 3 Encounters:   07/19/18 161 lb 8 oz (73.3 kg)   12/28/17 153 lb 14.4 oz (69.8 kg)   12/18/17 154 lb 8 oz (70.1 kg)    "           We Performed the Following     DEPRESSION ACTION PLAN (DAP)     FOOT EXAM          Where to get your medicines      Some of these will need a paper prescription and others can be bought over the counter.  Ask your nurse if you have questions.     Bring a paper prescription for each of these medications     metFORMIN 1000 MG tablet          Primary Care Provider Office Phone # Fax #    Yocasta Moreno [phone]       [address]  [facility] [address]        Equal Access to Services     KEENAN DAVIS : Hadii aad ku hadasho Soomaali, waaxda luqadaha, qaybta kaalmada adeegyada, waxay idiin hayaan adeeg kharash la'carlos . So [facility] [phone].    ATENCIÓN: Si habla español, tiene a bejarano disposición servicios gratuitos de asistencia lingüística. Llame al [phone].    We comply with applicable federal civil rights laws and [place] laws. We do not discriminate on the basis of race, color, national origin, age, disability, sex, sexual orientation, or gender identity.            Thank you!     Thank you for choosing [facility]  for your care. Our goal is always to provide you with excellent care. Hearing back from our patients is one way we can continue to improve our services. Please take a few minutes to complete the written survey that you may receive in the mail after your visit with us. Thank you!             Your Updated Medication List - Protect others around you: Learn how to safely use, store and throw away your medicines at www.disposemymeds.org.          This list is accurate as of 7/19/18  3:27 PM.  Always use your most recent med list.                   Brand Name Dispense Instructions for use Diagnosis    blood glucose lancets standard    no brand specified    1 Box    Use to test blood sugar 4 times daily or as directed. Anticipate decrease in the future.    Type 2 diabetes mellitus with hyperglycemia, without long-term current use of insulin (H)       blood  glucose monitoring lancets     1 Box    Use to test blood sugar 2 times daily or as directed.  Ok to substitute alternative if insurance prefers.    Type 2 diabetes mellitus with hyperglycemia, without long-term current use of insulin (H)       blood glucose monitoring meter device kit           * blood glucose monitoring test strip    no brand specified    100 strip    Use to test blood sugars 4 times daily or as directed. Anticipate decreasing in the future.    Type 2 diabetes mellitus with hyperglycemia, without long-term current use of insulin (H)       * blood glucose monitoring test strip    JOLLY CONTOUR NEXT    200 strip    Use to test blood sugar 4 times daily or as directed.  Ok to substitute alternative if insurance prefers.    Type 2 diabetes mellitus with hyperglycemia, without long-term current use of insulin (H)       metFORMIN 1000 MG tablet    GLUCOPHAGE    180 tablet    TAKE 1 TABLET BY MOUTH TWICE A DAY WITH MEALS    Type 2 diabetes mellitus with hyperglycemia, without long-term current use of insulin (H)       STATIN NOT PRESCRIBED (INTENTIONAL)      Please choose reason not prescribed, below    Hyperlipidemia LDL goal <100       * Notice:  This list has 2 medication(s) that are the same as other medications prescribed for you. Read the directions carefully, and ask your doctor or other care provider to review them with you.

## 2018-07-20 ASSESSMENT — ANXIETY QUESTIONNAIRES: GAD7 TOTAL SCORE: 15

## 2018-07-20 ASSESSMENT — PATIENT HEALTH QUESTIONNAIRE - PHQ9: SUM OF ALL RESPONSES TO PHQ QUESTIONS 1-9: 1

## 2018-10-02 ENCOUNTER — TELEPHONE (OUTPATIENT)
Dept: FAMILY MEDICINE | Facility: CLINIC | Age: 58
End: 2018-10-02

## 2018-10-02 NOTE — TELEPHONE ENCOUNTER
I have a note from her pharmacist that says she wanted them to reach out to see if appropriate to start a statin.    It is; she has deferred in the past, wanting to acclimate to her other meds first.    Is she ready now? It is appropriate...  If so, would like to do some kind of visit; a virtual one would be OK.    Thanks!

## 2018-10-02 NOTE — TELEPHONE ENCOUNTER
Called the pt.  Advised of below.  She says she will wait on that and is going to check with a few of her other doctors.

## 2018-10-26 ENCOUNTER — TRANSFERRED RECORDS (OUTPATIENT)
Dept: HEALTH INFORMATION MANAGEMENT | Facility: CLINIC | Age: 58
End: 2018-10-26

## 2018-11-30 ENCOUNTER — TRANSFERRED RECORDS (OUTPATIENT)
Dept: HEALTH INFORMATION MANAGEMENT | Facility: CLINIC | Age: 58
End: 2018-11-30

## 2018-12-04 ENCOUNTER — TRANSFERRED RECORDS (OUTPATIENT)
Dept: HEALTH INFORMATION MANAGEMENT | Facility: CLINIC | Age: 58
End: 2018-12-04

## 2018-12-30 ENCOUNTER — TRANSFERRED RECORDS (OUTPATIENT)
Dept: HEALTH INFORMATION MANAGEMENT | Facility: CLINIC | Age: 58
End: 2018-12-30

## 2019-01-01 ENCOUNTER — TRANSFERRED RECORDS (OUTPATIENT)
Dept: MULTI SPECIALTY CLINIC | Facility: CLINIC | Age: 59
End: 2019-01-01

## 2019-01-01 LAB — PAP SMEAR - HIM PATIENT REPORTED: NORMAL

## 2019-01-08 ENCOUNTER — TELEPHONE (OUTPATIENT)
Dept: FAMILY MEDICINE | Facility: CLINIC | Age: 59
End: 2019-01-08

## 2019-01-08 DIAGNOSIS — E11.9 TYPE 2 DIABETES MELLITUS WITHOUT COMPLICATION, WITHOUT LONG-TERM CURRENT USE OF INSULIN (H): ICD-10-CM

## 2019-01-08 DIAGNOSIS — E78.5 HYPERLIPIDEMIA LDL GOAL <100: Primary | ICD-10-CM

## 2019-01-08 NOTE — TELEPHONE ENCOUNTER
Reason for Call: Request for an order or referral:    Order or referral being requested: labs    Date needed: as soon as possible    Has the patient been seen by the PCP for this problem? YES    Additional comments: pt is coming in for a fasting phy 1/11/19 and would like the A1C and any other labs put in so they can be drawn right before the appt.    Phone number Patient can be reached at:  Home number on file [phone] (home)    Best Time:  any    Can we leave a detailed message on this number?  YES    Call taken on 1/8/2019 at 2:58 PM by Geeta Limon

## 2019-01-08 NOTE — TELEPHONE ENCOUNTER
Ordered Lipid- looks like Dr. Moreno wanted to start pt on statin, but pt did not want to at this time.     Ordered Hgb A1C- diabetes f/u.    Penelope RAO, RN     Will route to Dr Moreno for FYI and in case she wants anymore labs.

## 2019-01-11 ENCOUNTER — OFFICE VISIT (OUTPATIENT)
Dept: FAMILY MEDICINE | Facility: CLINIC | Age: 59
End: 2019-01-11
Payer: COMMERCIAL

## 2019-01-11 VITALS
DIASTOLIC BLOOD PRESSURE: 80 MMHG | HEART RATE: 87 BPM | HEIGHT: 62 IN | TEMPERATURE: 97.8 F | BODY MASS INDEX: 28.63 KG/M2 | SYSTOLIC BLOOD PRESSURE: 118 MMHG | RESPIRATION RATE: 16 BRPM | WEIGHT: 155.6 LBS | OXYGEN SATURATION: 98 %

## 2019-01-11 DIAGNOSIS — Z90.49 HISTORY OF RESECTION OF SMALL BOWEL: ICD-10-CM

## 2019-01-11 DIAGNOSIS — Z87.19 HISTORY OF INCISIONAL HERNIA REPAIR: ICD-10-CM

## 2019-01-11 DIAGNOSIS — E78.5 HYPERLIPIDEMIA LDL GOAL <100: ICD-10-CM

## 2019-01-11 DIAGNOSIS — Z98.890 HISTORY OF INCISIONAL HERNIA REPAIR: ICD-10-CM

## 2019-01-11 DIAGNOSIS — F41.9 ANXIETY: ICD-10-CM

## 2019-01-11 DIAGNOSIS — C50.211 MALIGNANT NEOPLASM OF UPPER-INNER QUADRANT OF RIGHT BREAST IN FEMALE, ESTROGEN RECEPTOR POSITIVE (H): ICD-10-CM

## 2019-01-11 DIAGNOSIS — Z17.0 MALIGNANT NEOPLASM OF UPPER-INNER QUADRANT OF RIGHT BREAST IN FEMALE, ESTROGEN RECEPTOR POSITIVE (H): ICD-10-CM

## 2019-01-11 DIAGNOSIS — Z09 HOSPITAL DISCHARGE FOLLOW-UP: Primary | ICD-10-CM

## 2019-01-11 DIAGNOSIS — F32.5 MAJOR DEPRESSIVE DISORDER, SINGLE EPISODE IN FULL REMISSION (H): ICD-10-CM

## 2019-01-11 DIAGNOSIS — E11.9 TYPE 2 DIABETES MELLITUS WITHOUT COMPLICATION, WITHOUT LONG-TERM CURRENT USE OF INSULIN (H): ICD-10-CM

## 2019-01-11 DIAGNOSIS — L98.9 LESION OF FINGER: ICD-10-CM

## 2019-01-11 DIAGNOSIS — R19.5 LOOSE STOOLS: ICD-10-CM

## 2019-01-11 LAB — HBA1C MFR BLD: 6.5 % (ref 0–5.6)

## 2019-01-11 PROCEDURE — 80061 LIPID PANEL: CPT | Performed by: FAMILY MEDICINE

## 2019-01-11 PROCEDURE — 80048 BASIC METABOLIC PNL TOTAL CA: CPT | Performed by: FAMILY MEDICINE

## 2019-01-11 PROCEDURE — 36415 COLL VENOUS BLD VENIPUNCTURE: CPT | Performed by: FAMILY MEDICINE

## 2019-01-11 PROCEDURE — 99214 OFFICE O/P EST MOD 30 MIN: CPT | Performed by: FAMILY MEDICINE

## 2019-01-11 PROCEDURE — 83036 HEMOGLOBIN GLYCOSYLATED A1C: CPT | Performed by: FAMILY MEDICINE

## 2019-01-11 RX ORDER — EXEMESTANE 25 MG/1
25 TABLET ORAL DAILY
COMMUNITY

## 2019-01-11 ASSESSMENT — PATIENT HEALTH QUESTIONNAIRE - PHQ9
SUM OF ALL RESPONSES TO PHQ QUESTIONS 1-9: 1
5. POOR APPETITE OR OVEREATING: MORE THAN HALF THE DAYS

## 2019-01-11 ASSESSMENT — ANXIETY QUESTIONNAIRES
6. BECOMING EASILY ANNOYED OR IRRITABLE: SEVERAL DAYS
GAD7 TOTAL SCORE: 14
7. FEELING AFRAID AS IF SOMETHING AWFUL MIGHT HAPPEN: NEARLY EVERY DAY
3. WORRYING TOO MUCH ABOUT DIFFERENT THINGS: MORE THAN HALF THE DAYS
5. BEING SO RESTLESS THAT IT IS HARD TO SIT STILL: SEVERAL DAYS
2. NOT BEING ABLE TO STOP OR CONTROL WORRYING: NEARLY EVERY DAY
IF YOU CHECKED OFF ANY PROBLEMS ON THIS QUESTIONNAIRE, HOW DIFFICULT HAVE THESE PROBLEMS MADE IT FOR YOU TO DO YOUR WORK, TAKE CARE OF THINGS AT HOME, OR GET ALONG WITH OTHER PEOPLE: SOMEWHAT DIFFICULT
1. FEELING NERVOUS, ANXIOUS, OR ON EDGE: MORE THAN HALF THE DAYS

## 2019-01-11 ASSESSMENT — MIFFLIN-ST. JEOR: SCORE: 1239.05

## 2019-01-11 NOTE — PROGRESS NOTES
SUBJECTIVE:   Teresa Hahn is a 58 year old female who presents to clinic today for the following health issues:          Hospital Follow-up Visit:    Hospital/Nursing Home/IP Rehab Facility: [facility]   Date of Admission: 12/30/2018  Date of Discharge: 01/04/2019  Reason(s) for Admission: bowel obstruction, hernia             Problems taking medications regularly:  None       Medication changes since discharge: None       Problems adhering to non-medication therapy:  None    Summary of hospitalization:  See outside records, reviewed and scanned; her Discharge papers and surgical office visit note in Care Everywhere.  Diagnostic Tests/Treatments reviewed.  Follow up needed: potassium  Other Healthcare Providers Involved in Patient s Care:         surgeon  Update since discharge: improved.     Post Discharge Medication Reconciliation: discharge medications reconciled, continue medications without change.  Plan of care communicated with patient     Coding guidelines for this visit:  Type of Medical   Decision Making Face-to-Face Visit       within 7 Days of discharge Face-to-Face Visit        within 14 days of discharge   Moderate Complexity [id number]   High Complexity [id number]              Diabetes Follow-up      Patient is checking blood sugars: not at all    Diabetic concerns: None     Symptoms of hypoglycemia (low blood sugar): none     Paresthesias (numbness or burning in feet) or sores: Yes tingling      Date of last diabetic eye exam: 03/01/2018  Would like note for work .  BP Readings from Last 2 Encounters:   07/19/18 110/70   12/28/17 101/68     Hemoglobin A1C (%)   Date Value   07/13/2018 6.4 (H)   11/28/2017 6.2 (H)     LDL Cholesterol Calculated (mg/dL)   Date Value   07/13/2018 113 (H)   11/28/2017 112 (H)       Diabetes Management Resources    Problem list and histories reviewed & adjusted, as indicated.  Additional history:     See under ROS     Patient Active Problem List   Diagnosis      Acute reaction to stress     Arthropathy     GERD (gastroesophageal reflux disease)     Anxiety     Health Care Home     TBI (traumatic brain injury) (H)     ADD (attention deficit disorder)     Major depressive disorder, single episode in full remission (H)     Malignant neoplasm of right female breast (H)     Hyperlipidemia LDL goal <100     Type 2 diabetes mellitus without complication, without long-term current use of insulin (H)     Past Surgical History:   Procedure Laterality Date     ABDOMEN SURGERY       x 4      ENT SURGERY      Tonsillectomy     MAMMOPLASTY REDUCTION Left 2017    Procedure: MAMMOPLASTY REDUCTION;  LEFT BREAST REDUCTION MAMMOPLASTY ; RIGHT BREAST REVISION TRAM FLAP ;  Surgeon: Charan Lane MD;  Location: [facility]     MASTECTOMY, RECONSTRUCT BREAST, COMBINED Right 2017     REVISION TRAM FLAP Right 2017    Procedure: REVISION TRAM FLAP;;  Surgeon: Charan Lane MD;  Location: [facility]     SURGICAL HISTORY OF -   2004    r leg broken in several areas; now arthritis. 3 surgeries     SURGICAL HISTORY OF -       right ankle fusion     SURGICAL HISTORY OF -  Left 2018    incisional hernia repair and small bowel resection (incarcerated)       Current Outpatient Medications   Medication Sig Dispense Refill     ACE/ARB/ARNI NOT PRESCRIBED, INTENTIONAL, Please choose reason not prescribed, below       blood glucose monitoring (ACCU-CHEK SHERYL SMARTVIEW) meter device kit   0     exemestane (AROMASIN) 25 MG tablet Take 25 mg by mouth daily       metFORMIN (GLUCOPHAGE) 1000 MG tablet TAKE 1 TABLET BY MOUTH TWICE A DAY WITH MEALS 180 tablet 1     STATIN NOT PRESCRIBED, INTENTIONAL, Please choose reason not prescribed, below       blood glucose (NO BRAND SPECIFIED) lancets standard Use to test blood sugar 4 times daily or as directed. Anticipate decrease in the future. (Patient not taking: Reported on 2018) 1 Box 0     blood glucose monitoring  "(ACCU-CHEK FASTCLIX) lancets Use to test blood sugar 2 times daily or as directed.  Ok to substitute alternative if insurance prefers. (Patient not taking: Reported on 7/19/2018) 1 Box 1     blood glucose monitoring (JOLLY CONTOUR NEXT) test strip Use to test blood sugar 4 times daily or as directed.  Ok to substitute alternative if insurance prefers. (Patient not taking: Reported on 7/19/2018) 200 strip 1     blood glucose monitoring (NO BRAND SPECIFIED) test strip Use to test blood sugars 4 times daily or as directed. Anticipate decreasing in the future. (Patient not taking: Reported on 7/19/2018) 100 strip prn         Reviewed and updated as needed this visit by clinical staff       Reviewed and updated as needed this visit by Provider         ROS:  CONSTITUTIONAL:losing weight; see recent surgery.   RESP:NEGATIVE for significant cough or SOB  CV: NEGATIVE for chest pain, palpitations or peripheral edema  GI:  see below.  PSYCHIATRIC: NEGATIVE for changes in mood or affect    Recent hospitalization with surgery.   LLQ incisional hernia repair, small bowel resetion.     Still has pain and fatigue.  Bowels figuring out how to work; will have some loose stools. She has always had softer stools; some of them are watery.  Eating.     Using some pain medication at night.   Does not want to mask pain during the day.    Requesting 3 week SARA. Starting from this past week.   Not supposed to lift for 4 weeks. (15/20 pounds).  Saw surgeon on Wednesday.     Also checking on her DM.  Had NG tube for several days. Was getting insulin.  Taking metformin once per day since home from the hospital; anticipates returning to bid when bowels regulate.     She notes a lump on her pinky that she just discovered. Not real painful.     OBJECTIVE:     /80 (BP Location: Right arm, Cuff Size: Adult Regular)   Pulse 87   Temp 97.8  F (36.6  C) (Oral)   Resp 16   Ht 1.575 m (5' 2\")   Wt 70.6 kg (155 lb 9.6 oz)   LMP 11/18/2013   " SpO2 98%   BMI 28.46 kg/m    Body mass index is 28.46 kg/m .  GENERAL APPEARANCE: alert and no distress  RESP: lungs clear to auscultation - no rales, rhonchi or wheezes  CV: regular rates and rhythm  ABDOMEN: soft; nontender.   She does have several small healing incisions from the laparoscopic procedure; clean and dry.  There is a thick dressing LLQ; near the lateral aspect of her C section scar. She is wearing an abdominal brace as well; helped her taket his off and get back on.  MS: no edema.   Dark/purple lump palmar aspect left fifth PIP; ~ 1/2 cm diameter.  PSYCH: mentation appears normal and affect normal/bright  Wearing brace    Left fifth finger; dark spot PIP    From Care Everywhere, surgical followup:    58 y.o. female s/p diagnostic laparoscopy, open left lower quadrant incisional hernia repair and exploratory laparotomy through the left lower quadrant incision, and partial small bowel resection for strangulated left lower quadrant incisional hernia on December 30, 2018.    Lab Results   Component Value Date    A1C 6.5 01/11/2019    A1C 6.4 07/13/2018    A1C 6.2 11/28/2017    A1C 5.9 07/11/2017    A1C 9.2 03/17/2017       Recent Labs   Lab Test 07/13/18  0810 11/28/17  0904   CHOL 201* 187   HDL 35* 45*   * 112*   TRIG 265* 152*     GFR Estimate   Date Value Ref Range Status   12/18/2017 >90 >60 mL/min/1.7m2 Final     Comment:     Non  GFR Calc   03/17/2017 >60 >60 ml/min/1.73m2 Final   11/05/2010 75 >60 mL/min/1.7m2 Final     Component      Latest Ref Rng & Units 7/13/2018   Creatinine Urine      mg/dL 134   Albumin Urine mg/L      mg/L 6   Albumin Urine mg/g Cr      0 - 25 mg/g Cr 4.60       PHQ-9 SCORE 7/11/2017 7/19/2018 1/11/2019   PHQ-9 Total Score - - -   PHQ-9 Total Score MyChart - - -   PHQ-9 Total Score 0 1 1       CHAKA-7 SCORE 7/11/2017 7/19/2018 1/11/2019   Total Score - - -   Total Score - - -   Total Score 3 15 14             ASSESSMENT/PLAN:     1. Hospital  "discharge follow-up  I do think it is reasonable for her to have a 3 week SARA as she is requesting. She does have weight restriction for 4 weeks; as well as was told to wear the abdominal brace. The surgeon did tell her she should listen to her body and have this help with her rtw.   Did do letter for three weeks as noted.     2. History of incisional hernia repair  Recovering from surgery.   I do think a 3 week leave of absence starting earlier this week is reasonable.   Have written letter.     3. History of resection of small bowel  As above.     4. Malignant neoplasm of upper-inner quadrant of right breast in female, estrogen receptor positive (H)  She is on Aromasin through Oncologist.     5. Type 2 diabetes mellitus without complication, without long-term current use of insulin (H)  Controlled.  Currently has decreased her metformin; anticipates increasing again as her bowels recover.   - **Basic metabolic panel FUTURE anytime  - **A1C FUTURE anytime  - metFORMIN (GLUCOPHAGE) 1000 MG tablet; TAKE 1 TABLET BY MOUTH TWICE A DAY WITH MEALS  Dispense: 180 tablet; Refill: 1    6. Major depressive disorder, single episode in full remission (H)  Stable; looking forward to a better year.    7. Loose stools  As above; suspect having a resection and antibiotics etc.     8. Hyperlipidemia LDL goal <100  Discussed statin.  She again defers. Previously, wanted to acclimate to Aromasin. Now would like to recover from surgery. Notes she would like to wait to talk about it until next appointment.   She is open to considering in the future.   - Lipid panel reflex to direct LDL Fasting    9. Anxiety  Stable.     10. Lesion of finger  Suspect vascular; either small varicosity/\"blood blister\" or other. Monitor currently; if painful or growing, to let us know.           Yocasta Moreno MD, MD  [facility] [place]  "

## 2019-01-11 NOTE — LETTER
"January 15, 2019      Teresa aHhn  [address]  [facility] [address]        Dear MsEdward Hahn,    Enclosed is a copy of your recent results.    The Hgb A1C is a reflection of your diabetes control over the last 3 months.  We do like to see this under 7.0.    The newer guidelines for cholesterol involve determining risk and making recommendations based on that.     HDL is considered the \"good\" cholesterol.  LDL is considered the \"bad\" cholesterol.     An elevated cholesterol is one factor that increases your risk for heart disease and stroke. You can improve your cholesterol by controlling the amount and type of fat you eat and by increasing your daily activity level.    It is still recommended to be on a medication for cholesterol.    I hope your recovery goes well and speedily!    Sincerely,     Yocasta Moreno MD      "

## 2019-01-11 NOTE — LETTER
[facility]  [address] [facility] [address] [phone]          January 11, 2019  Re:  Teresa Hahn                                                                                                                     [id number]  [facility] [address]            Dear To Whom it May Concern:    Teresa was seen in clinic today.  She is recovering from surgery.    Please excuse her absence from work starting 1/7/19.  I am recommending she be off work through 1/24/19.        Sincerely,         Yocasta Moreno MD

## 2019-01-12 LAB
ANION GAP SERPL CALCULATED.3IONS-SCNC: 6 MMOL/L (ref 3–14)
BUN SERPL-MCNC: 11 MG/DL (ref 7–30)
CALCIUM SERPL-MCNC: 9.6 MG/DL (ref 8.5–10.1)
CHLORIDE SERPL-SCNC: 104 MMOL/L (ref 94–109)
CHOLEST SERPL-MCNC: 185 MG/DL
CO2 SERPL-SCNC: 27 MMOL/L (ref 20–32)
CREAT SERPL-MCNC: 0.65 MG/DL (ref 0.52–1.04)
GFR SERPL CREATININE-BSD FRML MDRD: >90 ML/MIN/{1.73_M2}
GLUCOSE SERPL-MCNC: 138 MG/DL (ref 70–99)
HDLC SERPL-MCNC: 35 MG/DL
LDLC SERPL CALC-MCNC: 100 MG/DL
NONHDLC SERPL-MCNC: 150 MG/DL
POTASSIUM SERPL-SCNC: 4.8 MMOL/L (ref 3.4–5.3)
SODIUM SERPL-SCNC: 137 MMOL/L (ref 133–144)
TRIGL SERPL-MCNC: 249 MG/DL

## 2019-01-12 ASSESSMENT — ANXIETY QUESTIONNAIRES: GAD7 TOTAL SCORE: 14

## 2019-01-14 ENCOUNTER — DOCUMENTATION ONLY (OUTPATIENT)
Dept: FAMILY MEDICINE | Facility: CLINIC | Age: 59
End: 2019-01-14

## 2019-03-04 ENCOUNTER — TRANSFERRED RECORDS (OUTPATIENT)
Dept: HEALTH INFORMATION MANAGEMENT | Facility: CLINIC | Age: 59
End: 2019-03-04

## 2019-06-24 DIAGNOSIS — E11.9 TYPE 2 DIABETES MELLITUS WITHOUT COMPLICATION, WITHOUT LONG-TERM CURRENT USE OF INSULIN (H): ICD-10-CM

## 2019-06-24 NOTE — TELEPHONE ENCOUNTER
Requested Prescriptions   Pending Prescriptions Disp Refills     metFORMIN (GLUCOPHAGE) 1000 MG tablet  Last Written Prescription Date:  1/11/19  Last Fill Quantity: 180,  # refills: 1   Last office visit: 1/11/2019 with prescribing provider:  Yocasta Moreno MD   Future Office Visit:   Next 5 appointments (look out 90 days)    Aug 08, 2019 11:30 AM CDT  Office Visit with Yocasta Moreno MD  [address] [address] [phone]          180 tablet 1     Sig: TAKE 1 TABLET BY MOUTH TWICE A DAY WITH MEALS       Biguanide Agents Passed - 6/24/2019  4:06 PM        Passed - Blood pressure less than 140/90 in past 6 months     BP Readings from Last 3 Encounters:   01/11/19 118/80   07/19/18 110/70   12/28/17 101/68                 Passed - Patient has documented LDL within the past 12 mos.     Recent Labs   Lab Test 01/11/19  0851   *             Passed - Patient has had a Microalbumin in the past 15 mos.     Recent Labs   Lab Test 07/13/18  0810   MICROL 6   UMALCR 4.60             Passed - Patient is age 10 or older        Passed - Patient has documented A1c within the specified period of time.     If HgbA1C is 8 or greater, it needs to be on file within the past 3 months.  If less than 8, must be on file within the past 6 months.     Recent Labs   Lab Test 01/11/19  0851   A1C 6.5*             Passed - Patient's CR is NOT>1.4 OR Patient's EGFR is NOT<45 within past 12 mos.     Recent Labs   Lab Test 01/11/19  0851   GFRESTIMATED >90   GFRESTBLACK >90       Recent Labs   Lab Test 01/11/19  0851   CR 0.65             Passed - Patient does NOT have a diagnosis of CHF.        Passed - Medication is active on med list        Passed - Patient is not pregnant        Passed - Patient has not had a positive pregnancy test within the past 12 mos.         Passed - Recent (6 mo) or future (30 days) visit within the authorizing provider's specialty  "    Patient had office visit in the last 6 months or has a visit in the next 30 days with authorizing provider or within the authorizing provider's specialty.  See \"Patient Info\" tab in inbasket, or \"Choose Columns\" in Meds & Orders section of the refill encounter.              "

## 2019-06-25 NOTE — TELEPHONE ENCOUNTER
Prescription approved per [facility] Refill Protocol.    Zoie Ramey RN Flex

## 2019-07-02 ENCOUNTER — TELEPHONE (OUTPATIENT)
Dept: FAMILY MEDICINE | Facility: CLINIC | Age: 59
End: 2019-07-02

## 2019-07-02 NOTE — TELEPHONE ENCOUNTER
Appointment on 08/08 needs to be rescheduled.   Patient can see anyone in clinic or see PCP when she returns in late August/Early Sept.      Letter sent 7/2  Call made 7/2.

## 2019-07-02 NOTE — LETTER
July 2, 2019      Teresa H Selma  [address]  [facility] [address]        Dear Ms. Teresa CASTILLO Selma,    We are contacting you today to reschedule an upcoming appointment on 08/08/19 with your provider.  Your medical provider will be out of the office due to an unforseen leave of absence and is due to return in September.  Please call the clinic at [phone]  or use [employer] to reschedule this appointment.  We can reschedule with another provider in the clinic or for the next available appointment with your primary care provider.     We apologize for any inconvenience.       Your [facility] Team

## 2019-07-17 ENCOUNTER — TRANSFERRED RECORDS (OUTPATIENT)
Dept: HEALTH INFORMATION MANAGEMENT | Facility: CLINIC | Age: 59
End: 2019-07-17

## 2019-07-18 NOTE — PROGRESS NOTES
"Subjective     Teresa Hahn is a 58 year old female who presents to clinic today for the following health issues:    HPI   Diabetes Follow-up    How often are you checking your blood sugar? Not at all    What time of day are you checking your blood sugars (select all that apply)?  N/A    Have you had any blood sugars above 200?  Unsure     Have you had any blood sugars below 70?  Unsure     What symptoms do you notice when your blood sugar is low?  None    What concerns do you have today about your diabetes? None     Do you have any of these symptoms? (Select all that apply)  No numbness or tingling in feet.  No redness, sores or blisters on feet.  No complaints of excessive thirst.  No reports of blurry vision.  No significant changes to weight.     Have you had a diabetic eye exam in the last 12 months? Yes- Date of last eye exam: 7/2019    BP Readings from Last 2 Encounters:   07/19/19 117/80   01/11/19 118/80     Hemoglobin A1C (%)   Date Value   07/19/2019 6.9 (H)   01/11/2019 6.5 (H)     LDL Cholesterol Calculated (mg/dL)   Date Value   01/11/2019 100 (H)   07/13/2018 113 (H)     Teresa presents to follow up on diabetes  She feels well  Does NOT check the blood sugar  Diet is \"being watched\" but \"not Buddhist\"  Exercise; no current plan  Takes metformin most days; but not every day (1/2 the week)    Diabetes Management Resources    Patient Active Problem List   Diagnosis     Acute reaction to stress     Arthropathy     GERD (gastroesophageal reflux disease)     Anxiety     Health Care Home     TBI (traumatic brain injury) (H)     ADD (attention deficit disorder)     Major depressive disorder, single episode in full remission (H)     Malignant neoplasm of right female breast (H)     Hyperlipidemia LDL goal <100     Type 2 diabetes mellitus without complication, without long-term current use of insulin (H)     Status post small bowel resection     Past Surgical History:   Procedure Laterality Date     ABDOMEN " "SURGERY       x 4      ENT SURGERY      Tonsillectomy     MAMMOPLASTY REDUCTION Left 2017    Procedure: MAMMOPLASTY REDUCTION;  LEFT BREAST REDUCTION MAMMOPLASTY ; RIGHT BREAST REVISION TRAM FLAP ;  Surgeon: Charan Lane MD;  Location: [facility]     MASTECTOMY, RECONSTRUCT BREAST, COMBINED Right 2017     REVISION TRAM FLAP Right 2017    Procedure: REVISION TRAM FLAP;;  Surgeon: Charan Lane MD;  Location: [facility]     SURGICAL HISTORY OF -   2004    r leg broken in several areas; now arthritis. 3 surgeries     SURGICAL HISTORY OF -       right ankle fusion     SURGICAL HISTORY OF -  Left 2018    incisional hernia repair and small bowel resection (incarcerated)       Social History     Tobacco Use     Smoking status: Never Smoker     Smokeless tobacco: Never Used   Substance Use Topics     Alcohol use: Yes     Comment: 1/month      Family History   Problem Relation Age of Onset     C.A.D. Mother         numerous stents; over age 65.     Cancer Mother         multiple myeloma     Heart Disease Maternal Aunt         valve problem     Respiratory Maternal Aunt         COPD     Breast Cancer Other         paternal cousin     Other Cancer Brother 46        Lymphoma     Coronary Artery Disease Brother 47        MI     Arrhythmia Son         mild; might not trully be abnormal. had evaluation.         Reviewed and updated as needed this visit by Provider         Review of Systems   ROS COMP: Constitutional, HEENT, cardiovascular, pulmonary, gi and gu systems are negative, except as otherwise noted.      Objective    /80   Pulse 95   Temp 98.5  F (36.9  C) (Tympanic)   Resp 16   Ht 1.575 m (5' 2\")   Wt 75 kg (165 lb 4.8 oz)   LMP 2013   SpO2 96%   BMI 30.23 kg/m    Body mass index is 30.23 kg/m .  Physical Exam   GENERAL: healthy, alert and no distress  EYES: Eyes grossly normal to inspection, PERRL and conjunctivae and sclerae normal  RESP: lungs clear to " auscultation - no rales, rhonchi or wheezes  CV: regular rate and rhythm, normal S1 S2, no S3 or S4, no murmur, click or rub, no peripheral edema and peripheral pulses strong  MS: No peripheral edema   Diabetic foot exam: normal DP and PT pulses, no trophic changes or ulcerative lesions, normal sensory exam and normal monofilament exam    Diagnostic Test Results:  Labs reviewed in Epic        Assessment & Plan     1. Type 2 diabetes mellitus without complication, without long-term current use of insulin (H)  Lab Results   Component Value Date    A1C 6.9 07/19/2019    A1C 6.5 01/11/2019    A1C 6.4 07/13/2018    A1C 6.2 11/28/2017    A1C 5.9 07/11/2017   A1c at highest level since diagnosis. Cause is multifocal - she does not exercise regularly, her diet needs improvement and additionally is only taking her metformin in the evenings around half the days of the week. Reviewed with her the risks of poor control and she is motivated. She will work to improve compliance, attempt exercise plan and cut out the unhealthy snacks.   Additionally, I will start her on daily aspirin and update her pneumonia vaccine. She should follow up in 3 months for update. Supplies provided for glucose monitoring.    - Hemoglobin A1c; Future  - Albumin Random Urine Quantitative with Creat Ratio  - Hemoglobin A1c  - aspirin (ASA) 81 MG tablet; Take 1 tablet (81 mg) by mouth daily  Dispense: 90 tablet; Refill: 3  - metFORMIN (GLUCOPHAGE) 1000 MG tablet; TAKE 1 TABLET BY MOUTH TWICE A DAY WITH MEALS  Dispense: 180 tablet; Refill: 1  - blood glucose (NO BRAND SPECIFIED) lancets standard; Use to test blood sugar 1 times daily or as directed  Dispense: 1 each; Refill: 0  - blood glucose (NO BRAND SPECIFIED) test strip; Use to test blood sugars 1 times daily or as directed  Dispense: 100 strip; Refill: 1  - Pneumococcal vaccine 23 valent PPSV23  (Pneumovax) [11286]  - ADMIN: Vaccine, Initial (29148)  - SCREENING QUESTIONS FOR ADULT  "IMMUNIZATIONS    2. Status post small bowel resection  Reviewed her surgery and answered any questions.      3. Need for vaccination  P23       SHE REPORTS SHE IS UP TO DATE ON HER EYE EXAM AND HER PAP. SHE WILL SEND IN THE RECORDS    BMI:   Estimated body mass index is 30.23 kg/m  as calculated from the following:    Height as of this encounter: 1.575 m (5' 2\").    Weight as of this encounter: 75 kg (165 lb 4.8 oz).   Weight management plan: Discussed healthy diet and exercise guidelines          Return for Follow up for diabetes visit in 3 months.    Alfredo Mendoza PA-C  [facility]      "

## 2019-07-19 ENCOUNTER — NURSE TRIAGE (OUTPATIENT)
Dept: NURSING | Facility: CLINIC | Age: 59
End: 2019-07-19

## 2019-07-19 ENCOUNTER — OFFICE VISIT (OUTPATIENT)
Dept: FAMILY MEDICINE | Facility: CLINIC | Age: 59
End: 2019-07-19
Payer: COMMERCIAL

## 2019-07-19 VITALS
DIASTOLIC BLOOD PRESSURE: 80 MMHG | TEMPERATURE: 98.5 F | HEART RATE: 95 BPM | RESPIRATION RATE: 16 BRPM | SYSTOLIC BLOOD PRESSURE: 117 MMHG | OXYGEN SATURATION: 96 % | BODY MASS INDEX: 30.42 KG/M2 | HEIGHT: 62 IN | WEIGHT: 165.3 LBS

## 2019-07-19 DIAGNOSIS — E11.9 TYPE 2 DIABETES MELLITUS WITHOUT COMPLICATION, WITHOUT LONG-TERM CURRENT USE OF INSULIN (H): Primary | ICD-10-CM

## 2019-07-19 DIAGNOSIS — Z23 NEED FOR VACCINATION: ICD-10-CM

## 2019-07-19 DIAGNOSIS — Z90.49 STATUS POST SMALL BOWEL RESECTION: ICD-10-CM

## 2019-07-19 LAB — HBA1C MFR BLD: 6.9 % (ref 0–5.6)

## 2019-07-19 PROCEDURE — 36415 COLL VENOUS BLD VENIPUNCTURE: CPT | Performed by: PHYSICIAN ASSISTANT

## 2019-07-19 PROCEDURE — 90732 PPSV23 VACC 2 YRS+ SUBQ/IM: CPT | Performed by: PHYSICIAN ASSISTANT

## 2019-07-19 PROCEDURE — 90471 IMMUNIZATION ADMIN: CPT | Performed by: PHYSICIAN ASSISTANT

## 2019-07-19 PROCEDURE — 99214 OFFICE O/P EST MOD 30 MIN: CPT | Mod: 25 | Performed by: PHYSICIAN ASSISTANT

## 2019-07-19 PROCEDURE — 82043 UR ALBUMIN QUANTITATIVE: CPT | Performed by: PHYSICIAN ASSISTANT

## 2019-07-19 PROCEDURE — 83036 HEMOGLOBIN GLYCOSYLATED A1C: CPT | Performed by: PHYSICIAN ASSISTANT

## 2019-07-19 ASSESSMENT — ANXIETY QUESTIONNAIRES
6. BECOMING EASILY ANNOYED OR IRRITABLE: NOT AT ALL
1. FEELING NERVOUS, ANXIOUS, OR ON EDGE: SEVERAL DAYS
GAD7 TOTAL SCORE: 4
5. BEING SO RESTLESS THAT IT IS HARD TO SIT STILL: NOT AT ALL
3. WORRYING TOO MUCH ABOUT DIFFERENT THINGS: SEVERAL DAYS
7. FEELING AFRAID AS IF SOMETHING AWFUL MIGHT HAPPEN: SEVERAL DAYS
2. NOT BEING ABLE TO STOP OR CONTROL WORRYING: SEVERAL DAYS
IF YOU CHECKED OFF ANY PROBLEMS ON THIS QUESTIONNAIRE, HOW DIFFICULT HAVE THESE PROBLEMS MADE IT FOR YOU TO DO YOUR WORK, TAKE CARE OF THINGS AT HOME, OR GET ALONG WITH OTHER PEOPLE: SOMEWHAT DIFFICULT

## 2019-07-19 ASSESSMENT — MIFFLIN-ST. JEOR: SCORE: 1283.05

## 2019-07-19 ASSESSMENT — PATIENT HEALTH QUESTIONNAIRE - PHQ9
5. POOR APPETITE OR OVEREATING: NOT AT ALL
SUM OF ALL RESPONSES TO PHQ QUESTIONS 1-9: 1

## 2019-07-19 NOTE — TELEPHONE ENCOUNTER
Reason for call;  Pt called and declines triage . Pt wants to  call back to clinic after 8am to speak to clinic nurse only ,  about clarification on appt today scheduled at 11am.   Recommendation / teaching ;     Caller Verbalizes understanding and denies further questions and will call back if further symptoms to triage or questions  .  Tabitha Sanon RN  - [place] Nurse Advisor

## 2019-07-19 NOTE — NURSING NOTE
HM: Eye exam done at [facility] ([facility]) in 7/2019. Last pap done at [facility] approx 01/2019

## 2019-07-20 LAB
CREAT UR-MCNC: 27 MG/DL
MICROALBUMIN UR-MCNC: <5 MG/L
MICROALBUMIN/CREAT UR: NORMAL MG/G CR (ref 0–25)

## 2019-07-20 ASSESSMENT — ANXIETY QUESTIONNAIRES: GAD7 TOTAL SCORE: 4

## 2019-07-30 ENCOUNTER — TRANSFERRED RECORDS (OUTPATIENT)
Dept: HEALTH INFORMATION MANAGEMENT | Facility: CLINIC | Age: 59
End: 2019-07-30

## 2019-08-30 ENCOUNTER — TRANSFERRED RECORDS (OUTPATIENT)
Dept: HEALTH INFORMATION MANAGEMENT | Facility: CLINIC | Age: 59
End: 2019-08-30

## 2019-10-07 ENCOUNTER — TELEPHONE (OUTPATIENT)
Dept: FAMILY MEDICINE | Facility: CLINIC | Age: 59
End: 2019-10-07

## 2019-10-07 NOTE — TELEPHONE ENCOUNTER
Reason for call:  Symptom   Symptom or request: Abdominal pain. Persistent heartburn     Duration (how long have symptoms been present): A little over 1 week    Have you been treated for this before? Yes    Additional comments: Please     Phone number to reach patient:  Cell number on file:    Telephone Information:   Mobile [phone]       Best Time:  ANY     Can we leave a detailed message on this number?  YES

## 2019-10-14 NOTE — TELEPHONE ENCOUNTER
Heartburn related. Heaviness below sternum- comes and goes feels almost like food gets stuck.   No chest pain. No SOB. No N/V. Pain does not shoot/radiate anywhere.   No Fevers.   Scheduled w/ MH- adv if symptoms worsen need to go to UC/ED.     Penelope RAO RN

## 2019-12-05 ENCOUNTER — TRANSFERRED RECORDS (OUTPATIENT)
Dept: HEALTH INFORMATION MANAGEMENT | Facility: CLINIC | Age: 59
End: 2019-12-05

## 2020-01-08 ENCOUNTER — TRANSFERRED RECORDS (OUTPATIENT)
Dept: HEALTH INFORMATION MANAGEMENT | Facility: CLINIC | Age: 60
End: 2020-01-08

## 2020-02-27 ENCOUNTER — OFFICE VISIT (OUTPATIENT)
Dept: FAMILY MEDICINE | Facility: CLINIC | Age: 60
End: 2020-02-27
Payer: COMMERCIAL

## 2020-02-27 VITALS
OXYGEN SATURATION: 96 % | HEART RATE: 96 BPM | RESPIRATION RATE: 16 BRPM | DIASTOLIC BLOOD PRESSURE: 78 MMHG | TEMPERATURE: 98.1 F | HEIGHT: 61 IN | SYSTOLIC BLOOD PRESSURE: 112 MMHG | WEIGHT: 166.2 LBS | BODY MASS INDEX: 31.38 KG/M2

## 2020-02-27 DIAGNOSIS — E11.9 TYPE 2 DIABETES MELLITUS WITHOUT COMPLICATION, WITHOUT LONG-TERM CURRENT USE OF INSULIN (H): Primary | ICD-10-CM

## 2020-02-27 DIAGNOSIS — E78.5 HYPERLIPIDEMIA LDL GOAL <100: ICD-10-CM

## 2020-02-27 DIAGNOSIS — K21.9 GASTROESOPHAGEAL REFLUX DISEASE, ESOPHAGITIS PRESENCE NOT SPECIFIED: ICD-10-CM

## 2020-02-27 DIAGNOSIS — C50.211 MALIGNANT NEOPLASM OF UPPER-INNER QUADRANT OF RIGHT BREAST IN FEMALE, ESTROGEN RECEPTOR POSITIVE (H): ICD-10-CM

## 2020-02-27 DIAGNOSIS — Z17.0 MALIGNANT NEOPLASM OF UPPER-INNER QUADRANT OF RIGHT BREAST IN FEMALE, ESTROGEN RECEPTOR POSITIVE (H): ICD-10-CM

## 2020-02-27 DIAGNOSIS — N63.0 LUMP OR MASS IN BREAST: ICD-10-CM

## 2020-02-27 DIAGNOSIS — F32.5 MAJOR DEPRESSIVE DISORDER, SINGLE EPISODE IN FULL REMISSION (H): ICD-10-CM

## 2020-02-27 LAB — HBA1C MFR BLD: 8.4 % (ref 0–5.6)

## 2020-02-27 PROCEDURE — 80053 COMPREHEN METABOLIC PANEL: CPT | Performed by: FAMILY MEDICINE

## 2020-02-27 PROCEDURE — 99214 OFFICE O/P EST MOD 30 MIN: CPT | Performed by: FAMILY MEDICINE

## 2020-02-27 PROCEDURE — 96127 BRIEF EMOTIONAL/BEHAV ASSMT: CPT | Performed by: FAMILY MEDICINE

## 2020-02-27 PROCEDURE — 83036 HEMOGLOBIN GLYCOSYLATED A1C: CPT | Performed by: FAMILY MEDICINE

## 2020-02-27 PROCEDURE — 84443 ASSAY THYROID STIM HORMONE: CPT | Performed by: FAMILY MEDICINE

## 2020-02-27 PROCEDURE — 36415 COLL VENOUS BLD VENIPUNCTURE: CPT | Performed by: FAMILY MEDICINE

## 2020-02-27 ASSESSMENT — PATIENT HEALTH QUESTIONNAIRE - PHQ9
SUM OF ALL RESPONSES TO PHQ QUESTIONS 1-9: 2
5. POOR APPETITE OR OVEREATING: SEVERAL DAYS

## 2020-02-27 ASSESSMENT — ANXIETY QUESTIONNAIRES
6. BECOMING EASILY ANNOYED OR IRRITABLE: NOT AT ALL
5. BEING SO RESTLESS THAT IT IS HARD TO SIT STILL: NOT AT ALL
GAD7 TOTAL SCORE: 5
3. WORRYING TOO MUCH ABOUT DIFFERENT THINGS: SEVERAL DAYS
7. FEELING AFRAID AS IF SOMETHING AWFUL MIGHT HAPPEN: SEVERAL DAYS
1. FEELING NERVOUS, ANXIOUS, OR ON EDGE: SEVERAL DAYS
2. NOT BEING ABLE TO STOP OR CONTROL WORRYING: SEVERAL DAYS
IF YOU CHECKED OFF ANY PROBLEMS ON THIS QUESTIONNAIRE, HOW DIFFICULT HAVE THESE PROBLEMS MADE IT FOR YOU TO DO YOUR WORK, TAKE CARE OF THINGS AT HOME, OR GET ALONG WITH OTHER PEOPLE: SOMEWHAT DIFFICULT

## 2020-02-27 ASSESSMENT — MIFFLIN-ST. JEOR: SCORE: 1266.26

## 2020-02-27 NOTE — PATIENT INSTRUCTIONS
Do some monitoring of your sugars.    It is nice to get information for fasting glucoses and around meals.  I would strongly recommend getting some fasting and some 2 hours after your largest meal.  If you can do prior to and 2 hours after meals, that can also be helpful information.    You can take both metformin at the same time.  (If you get stomach upset/diarrhea, splitting it can be helpful).

## 2020-02-27 NOTE — LETTER
March 2, 2020      Teresa Hahn  [id number]  [place] [address]        Dear Ms. Teresa Hahn,    Enclosed is a copy of your recent results.    You already saw the HgbA1C.    One of the liver tests is slightly elevated (ALT).    TSH stands for Thyroid Stimulating Hormone. It is the most sensitive thyroid test that we have. It goes in the opposite direction of the thyroid hormone level; if your thyroid is not producing sufficient thyroid hormone, it goes up to tell your thyroid to make more.    Keep in touch!    Sincerely,     Yocasta Moreno MD

## 2020-02-28 LAB
ALBUMIN SERPL-MCNC: 3.9 G/DL (ref 3.4–5)
ALP SERPL-CCNC: 132 U/L (ref 40–150)
ALT SERPL W P-5'-P-CCNC: 67 U/L (ref 0–50)
ANION GAP SERPL CALCULATED.3IONS-SCNC: 5 MMOL/L (ref 3–14)
AST SERPL W P-5'-P-CCNC: 28 U/L (ref 0–45)
BILIRUB SERPL-MCNC: 0.3 MG/DL (ref 0.2–1.3)
BUN SERPL-MCNC: 13 MG/DL (ref 7–30)
CALCIUM SERPL-MCNC: 9.3 MG/DL (ref 8.5–10.1)
CHLORIDE SERPL-SCNC: 103 MMOL/L (ref 94–109)
CO2 SERPL-SCNC: 27 MMOL/L (ref 20–32)
CREAT SERPL-MCNC: 0.65 MG/DL (ref 0.52–1.04)
GFR SERPL CREATININE-BSD FRML MDRD: >90 ML/MIN/{1.73_M2}
GLUCOSE SERPL-MCNC: 187 MG/DL (ref 70–99)
POTASSIUM SERPL-SCNC: 4.2 MMOL/L (ref 3.4–5.3)
PROT SERPL-MCNC: 7.1 G/DL (ref 6.8–8.8)
SODIUM SERPL-SCNC: 135 MMOL/L (ref 133–144)
TSH SERPL DL<=0.005 MIU/L-ACNC: 0.8 MU/L (ref 0.4–4)

## 2020-02-28 ASSESSMENT — ANXIETY QUESTIONNAIRES: GAD7 TOTAL SCORE: 5

## 2020-03-04 ENCOUNTER — TELEPHONE (OUTPATIENT)
Dept: FAMILY MEDICINE | Facility: CLINIC | Age: 60
End: 2020-03-04

## 2020-03-04 NOTE — LETTER
March 4, 2020      Teresa Hahn  [address]  [place] [address]    Dear Teresa,    Our records show that your primary care provider would like you to meet with a clinical pharmacist for a Medication Therapy Management (MTM) appointment. Your medications play an important role in your health.  I would like to meet with you to review how your medications are working for you and to answer any questions you may have.       To schedule an appointment, please call the [facility] at #[phone].    I hope to see you soon!     Sincerely,      Betsy Kaba, PharmD  Medication Therapy Management Pharmacist

## 2020-03-04 NOTE — TELEPHONE ENCOUNTER
MTM referral from: [facility] visit (referral by provider)    MTM referral outreach attempt #2 on March 4, 2020 at 10:21 AM      Outcome: Initially patient was scheduled for a in-person MTM appointment on Thursday, 03/12/2020 with Ajith Meyer. I had contacted and informed the patient that unfortunately her insurance will not cover for MTM visit. Patient was disappointed. She stated she'd like to cancel her appointment and declined services. Will route message to [facility] Pharmacist/Provider as a FYI.    Thank you,  See José [facility] Pharmacy Coordinator

## 2020-03-09 NOTE — TELEPHONE ENCOUNTER
Hi See - did she not want a consult either just to discuss the diabetes medicine she's interested in? I thought you had previously asked if we could just do a single consult for her?    Thanks,  Betsy Kaba, PharmD  Medication Therapy Management Provider, [facility]  Pager: [phone]

## 2020-03-16 ENCOUNTER — MYC MEDICAL ADVICE (OUTPATIENT)
Dept: FAMILY MEDICINE | Facility: CLINIC | Age: 60
End: 2020-03-16

## 2020-03-16 DIAGNOSIS — Z00.00 ROUTINE GENERAL MEDICAL EXAMINATION AT A HEALTH CARE FACILITY: Primary | ICD-10-CM

## 2020-03-16 NOTE — TELEPHONE ENCOUNTER
Called the pharmacy and spoke to Aniyah.  She could write it on a pad.  They have some at the pharmacy if needed.  Will forward to Dr. Moreno for advisal.

## 2020-03-16 NOTE — TELEPHONE ENCOUNTER
Would like to get Tdap at our pharmacy... Please see how we can let them know to do that.  I have sometimes sent over a circled sheet...     I just t'd up a prescription... see if that works. If so, you can approve.   Thanks!

## 2020-03-16 NOTE — TELEPHONE ENCOUNTER
Then please have her go ahead and write it on a pad.   She will keep it somewhere to have when Teresa shows up?     Thanks!

## 2020-04-03 DIAGNOSIS — E11.9 TYPE 2 DIABETES MELLITUS WITHOUT COMPLICATION, WITHOUT LONG-TERM CURRENT USE OF INSULIN (H): ICD-10-CM

## 2020-04-03 NOTE — TELEPHONE ENCOUNTER
Needs to have a 90  Days supply sent    Fax [phone]  [facility]    Phone [phone]

## 2020-04-03 NOTE — TELEPHONE ENCOUNTER
"Requested Prescriptions   Pending Prescriptions Disp Refills     metFORMIN (GLUCOPHAGE) 1000 MG tablet 180 tablet 0     Sig: TAKE 1 TABLET BY MOUTH TWICE A DAY WITH MEALS  Last Written Prescription Date:  2/27/2020  Last Fill Quantity: 180,  # refills: 0   Last office visit: 2/27/2020 with prescribing provider:  Yocasta Moreno   Future Office Visit:  none         Biguanide Agents Passed - 4/3/2020  1:27 PM        Passed - Patient is age 10 or older        Passed - Patient has documented A1c within the specified period of time.     If HgbA1C is 8 or greater, it needs to be on file within the past 3 months.  If less than 8, must be on file within the past 6 months.     Recent Labs   Lab Test 02/27/20  1505   A1C 8.4*             Passed - Patient's CR is NOT>1.4 OR Patient's EGFR is NOT<45 within past 12 mos.     Recent Labs   Lab Test 02/27/20  1505   GFRESTIMATED >90   GFRESTBLACK >90       Recent Labs   Lab Test 02/27/20  1505   CR 0.65             Passed - Patient does NOT have a diagnosis of CHF.        Passed - Medication is active on med list        Passed - Patient is not pregnant        Passed - Patient has not had a positive pregnancy test within the past 12 mos.         Passed - Recent (6 mo) or future (30 days) visit within the authorizing provider's specialty     Patient had office visit in the last 6 months or has a visit in the next 30 days with authorizing provider or within the authorizing provider's specialty.  See \"Patient Info\" tab in inbasket, or \"Choose Columns\" in Meds & Orders section of the refill encounter.                 Needs to have a 90  Days supply sent  Fax [phone]  [facility]  Phone [phone]     Cece Bird CMA ([facility])      "

## 2020-04-06 NOTE — TELEPHONE ENCOUNTER
Metformin refill request; see info below.  RN not able to order; needs to be locally printed and faxed.  States cannot be escribed to this mail order pharmacy.  Deanne Mcdonald RN

## 2020-07-01 ENCOUNTER — TRANSFERRED RECORDS (OUTPATIENT)
Dept: HEALTH INFORMATION MANAGEMENT | Facility: CLINIC | Age: 60
End: 2020-07-01

## 2020-07-13 DIAGNOSIS — E11.9 TYPE 2 DIABETES MELLITUS WITHOUT COMPLICATION, WITHOUT LONG-TERM CURRENT USE OF INSULIN (H): ICD-10-CM

## 2020-07-13 NOTE — LETTER
September 14, 2020      Teresa Hahn  [address]  [place] [address]        Dear Ms. Teresa CASTILLO Selma,    We are contacting you today to notify you that you are due for a medication follow up for further refills.     This appointment can be in clinic or virtual by either telephone, video.    Please call [phone] to schedule an appointment.     [facility]      Sincerely,     Yocasta Moreno MD

## 2020-07-14 NOTE — TELEPHONE ENCOUNTER
metFORMIN (GLUCOPHAGE) 1000 MG tablet  Last Written Prescription Date:  4/6/20  Last Fill Quantity: 180,   # refills: 0  Last Office Visit: 2/27/20  Future Office visit:       Routing refill request to provider for review/approval because:  Failed A1c    Alis Pitt RN on 7/14/2020 at 8:38 AM

## 2020-07-14 NOTE — TELEPHONE ENCOUNTER
I did do refill.   Please help her schedule an appointment.  Thanks!          Lab Results   Component Value Date    A1C 8.4 02/27/2020    A1C 6.9 07/19/2019    A1C 6.5 01/11/2019    A1C 6.4 07/13/2018    A1C 6.2 11/28/2017

## 2020-08-04 NOTE — TELEPHONE ENCOUNTER
"Clinic Action Needed:Please call on I/29/18 when RX is ready per pt request  Reason for Call:I would like my PCP to write out a hard copy of my RX metFORMIN (GLUCOPHAGE) 1000 MG tablet 180 tablet 1 1/17/2018  No  Sig: TAKE 1 TABLET BY MOUTH TWICE A DAY WITH MEALS    I would like to pick it up before the end of next week.\"    Patient Recommendations/Teaching: I will route request to PCP.   Routed to:PCP  [facility]          " What Is The Reason For Today's Visit?: Full Body Skin Examination Additional History: PAtient reports no areas of concern. His wife would like his face assessed.

## 2020-10-02 ENCOUNTER — VIRTUAL VISIT (OUTPATIENT)
Dept: FAMILY MEDICINE | Facility: CLINIC | Age: 60
End: 2020-10-02
Payer: COMMERCIAL

## 2020-10-02 DIAGNOSIS — F41.9 ANXIETY: ICD-10-CM

## 2020-10-02 DIAGNOSIS — E78.5 HYPERLIPIDEMIA LDL GOAL <100: ICD-10-CM

## 2020-10-02 DIAGNOSIS — R74.02 NONSPECIFIC ELEVATION OF LEVELS OF TRANSAMINASE OR LACTIC ACID DEHYDROGENASE (LDH): ICD-10-CM

## 2020-10-02 DIAGNOSIS — C50.211 MALIGNANT NEOPLASM OF UPPER-INNER QUADRANT OF RIGHT BREAST IN FEMALE, ESTROGEN RECEPTOR POSITIVE (H): ICD-10-CM

## 2020-10-02 DIAGNOSIS — E11.9 TYPE 2 DIABETES MELLITUS WITHOUT COMPLICATION, WITHOUT LONG-TERM CURRENT USE OF INSULIN (H): Primary | ICD-10-CM

## 2020-10-02 DIAGNOSIS — Z17.0 MALIGNANT NEOPLASM OF UPPER-INNER QUADRANT OF RIGHT BREAST IN FEMALE, ESTROGEN RECEPTOR POSITIVE (H): ICD-10-CM

## 2020-10-02 DIAGNOSIS — R74.01 NONSPECIFIC ELEVATION OF LEVELS OF TRANSAMINASE OR LACTIC ACID DEHYDROGENASE (LDH): ICD-10-CM

## 2020-10-02 PROCEDURE — 99214 OFFICE O/P EST MOD 30 MIN: CPT | Mod: TEL | Performed by: FAMILY MEDICINE

## 2020-10-02 NOTE — PROGRESS NOTES
"Teresa Hahn is a 59 year old female who is being evaluated via a billable telephone visit.      The patient has been notified of following:     \"This telephone visit will be conducted via a call between you and your physician/provider. We have found that certain health care needs can be provided without the need for a physical exam.  This service lets us provide the care you need with a short phone conversation.  If a prescription is necessary we can send it directly to your pharmacy.  If lab work is needed we can place an order for that and you can then stop by our lab to have the test done at a later time.    Telephone visits are billed at different rates depending on your insurance coverage. During this emergency period, for some insurers they may be billed the same as an in-person visit.  Please reach out to your insurance provider with any questions.    If during the course of the call the physician/provider feels a telephone visit is not appropriate, you will not be charged for this service.\"    Patient has given verbal consent for Telephone visit?  Yes    What phone number would you like to be contacted at? [phone]    How would you like to obtain your AVS? Neelima Torres     Teresa Hahn is a 59 year old female who presents via phone visit today for the following health issues:    HPI     Diabetes Follow-up      How often are you checking your blood sugar? Not at all    What concerns do you have today about your diabetes? None     Do you have any of these symptoms? (Select all that apply)  No numbness or tingling in feet.  No redness, sores or blisters on feet.  No complaints of excessive thirst.  No reports of blurry vision.  No significant changes to weight.    Have you had a diabetic eye exam in the last 12 months? No        BP Readings from Last 2 Encounters:   02/27/20 112/78   07/19/19 117/80     Hemoglobin A1C (%)   Date Value   02/27/2020 8.4 (H)   07/19/2019 6.9 (H)     LDL Cholesterol " Calculated (mg/dL)   Date Value   01/11/2019 100 (H)   07/13/2018 113 (H)         Hyperlipidemia Follow-Up      Are you regularly taking any medication or supplement to lower your cholesterol?   No    Are you having muscle aches or other side effects that you think could be caused by your cholesterol lowering medication?  N/a         See under ROS     Patient Active Problem List   Diagnosis     Acute reaction to stress     Arthropathy     GERD (gastroesophageal reflux disease)     Anxiety     Health Care Home     TBI (traumatic brain injury) (H)     ADD (attention deficit disorder)     Major depressive disorder, single episode in full remission (H)     Malignant neoplasm of right female breast (H)     Hyperlipidemia LDL goal <100     Type 2 diabetes mellitus without complication, without long-term current use of insulin (H)     Status post small bowel resection       Current Outpatient Medications   Medication Sig Dispense Refill     ACE/ARB/ARNI NOT PRESCRIBED, INTENTIONAL, Please choose reason not prescribed, below       exemestane (AROMASIN) 25 MG tablet Take 25 mg by mouth daily       metFORMIN (GLUCOPHAGE) 1000 MG tablet TAKE 1 TABLET TWICE DAILY  WITH MEALS 180 tablet 0     aspirin (ASA) 81 MG tablet Take 1 tablet (81 mg) by mouth daily (Patient not taking: Reported on 2/27/2020) 90 tablet 3     blood glucose (NO BRAND SPECIFIED) lancets standard Use to test blood sugar 1 times daily or as directed (Patient not taking: Reported on 2/27/2020) 1 each 0     blood glucose (NO BRAND SPECIFIED) test strip Use to test blood sugars 1 times daily or as directed (Patient not taking: Reported on 2/27/2020) 100 strip 1     blood glucose monitoring (ACCU-CHEK SHERYL SMARTVIEW) meter device kit   0     blood glucose monitoring (JOLLY CONTOUR NEXT) test strip Use to test blood sugar 4 times daily or as directed.  Ok to substitute alternative if insurance prefers. (Patient not taking: Reported on 7/19/2018) 200 strip 1      blood glucose monitoring (NO BRAND SPECIFIED) meter device kit Use to test blood sugar 1 times daily or as directed.  Please give needed supplies as well, including lancets (#100), test strips (#100) and control solution. (Patient not taking: Reported on 10/2/2020) 1 kit 0     STATIN NOT PRESCRIBED, INTENTIONAL, Please choose reason not prescribed, below          Family History   Problem Relation Age of Onset     C.A.D. Mother         numerous stents; over age 65.     Cancer Mother         multiple myeloma     Heart Disease Maternal Aunt         valve problem     Respiratory Maternal Aunt         COPD     Breast Cancer Other         paternal cousin     Other Cancer Brother 46        Lymphoma     Coronary Artery Disease Brother 47        MI; has a pacemaker     Arrhythmia Brother      Arrhythmia Son         mild; might not trully be abnormal. had evaluation.     Coronary Artery Disease Brother         ? rhythm     Arrhythmia Daughter         early postpartum       Review of Systems   CONSTITUTIONAL:NEGATIVE for fever, chills, change in weight  ENT/MOUTH: NEGATIVE for ear, mouth and throat problems  RESP:NEGATIVE for significant cough or SOB  CV: NEGATIVE for chest pain, palpitations. Occasionally a little rapid.   MUSCULOSKELETAL: no swelling.  PSYCHIATRIC: NEGATIVE for changes in mood or affect     Overall doing OK.  She notes she is diligent about taking meds in the am.   Has not been checking sugars.   Has been more diligent with diet and exercise.      is traveling a lot; more during the summer.    Brother passed this week.     Became a grandma in March.   Her daughter went into a fib shortly after getting home.     Does do some phone counseling related to this stress. This has been helpful for mood.     Sees both medical and surgical oncologist related to her breast cancer.   Last January, they felt something and she had a biopsy. She notes it was benign.     Objective          Vitals:  No vitals were  obtained today due to virtual visit.    healthy, alert and no distress  PSYCH: Alert and oriented times 3; coherent speech, normal   rate and volume, able to articulate logical thoughts, able   to abstract reason. Her affect is normal  RESP: No cough, no audible wheezing, able to talk in full sentences  Remainder of exam unable to be completed due to telephone visits        Lab Results   Component Value Date    A1C 8.4 02/27/2020    A1C 6.9 07/19/2019    A1C 6.5 01/11/2019    A1C 6.4 07/13/2018    A1C 6.2 11/28/2017     Recent Labs   Lab Test 01/11/19  0851 07/13/18  0810   CHOL 185 201*   HDL 35* 35*   * 113*   TRIG 249* 265*             Assessment/Plan:    Assessment & Plan     Type 2 diabetes mellitus without complication, without long-term current use of insulin (H)  She will come in for fasting lab in the near future.   Discussed the elevated HgbA1C from previously. Overall, would consider adding another medication if elevated, though one could add another 500 mg metformin (short acting) at noon. I think this is what she may prefer if elevated. Otherwise discussed briefly some considerations for other medication.  - Comprehensive metabolic panel; Future  - Hemoglobin A1c; Future  - metFORMIN (GLUCOPHAGE) 1000 MG tablet; TAKE 1 TABLET TWICE DAILY  WITH MEALS    Malignant neoplasm of upper-inner quadrant of right breast in female, estrogen receptor positive (H)  She does continue with specialists. Notes she might need to switch aromatase inhibitors due to insurance coverage; she would rather not as she is tolerating this on.     Hyperlipidemia LDL goal <100  Will come in for fasting lab.  Discussed consideration of statin. She is still reluctant. She recalls she did not want to start as she was still getting used to medication with her cancer; though she does relate this has been sometime now. Would like to see what her lab is.     Nonspecific elevation of levels of transaminase or lactic acid  dehydrogenase (LDH)  Rechecking.  - Comprehensive metabolic panel; Future    Anxiety  She notes she is doing well. Has current stressors as noted above.      Discussed flu shot; she will probably not get this.     Return in about 1 week (around 10/9/2020) for Lab Work.    Yocasta Moreno MD, MD  [facility]        Phone call duration:  32 minutes

## 2020-10-19 DIAGNOSIS — E78.5 HYPERLIPIDEMIA LDL GOAL <100: ICD-10-CM

## 2020-10-19 DIAGNOSIS — R74.01 NONSPECIFIC ELEVATION OF LEVELS OF TRANSAMINASE OR LACTIC ACID DEHYDROGENASE (LDH): ICD-10-CM

## 2020-10-19 DIAGNOSIS — E11.9 TYPE 2 DIABETES MELLITUS WITHOUT COMPLICATION, WITHOUT LONG-TERM CURRENT USE OF INSULIN (H): ICD-10-CM

## 2020-10-19 DIAGNOSIS — R74.02 NONSPECIFIC ELEVATION OF LEVELS OF TRANSAMINASE OR LACTIC ACID DEHYDROGENASE (LDH): ICD-10-CM

## 2020-10-19 LAB
ALBUMIN SERPL-MCNC: 3.7 G/DL (ref 3.4–5)
ALP SERPL-CCNC: 112 U/L (ref 40–150)
ALT SERPL W P-5'-P-CCNC: 61 U/L (ref 0–50)
ANION GAP SERPL CALCULATED.3IONS-SCNC: 6 MMOL/L (ref 3–14)
AST SERPL W P-5'-P-CCNC: 27 U/L (ref 0–45)
BILIRUB SERPL-MCNC: 0.5 MG/DL (ref 0.2–1.3)
BUN SERPL-MCNC: 10 MG/DL (ref 7–30)
CALCIUM SERPL-MCNC: 9.9 MG/DL (ref 8.5–10.1)
CHLORIDE SERPL-SCNC: 105 MMOL/L (ref 94–109)
CHOLEST SERPL-MCNC: 186 MG/DL
CO2 SERPL-SCNC: 26 MMOL/L (ref 20–32)
CREAT SERPL-MCNC: 0.68 MG/DL (ref 0.52–1.04)
GFR SERPL CREATININE-BSD FRML MDRD: >90 ML/MIN/{1.73_M2}
GLUCOSE SERPL-MCNC: 159 MG/DL (ref 70–99)
HBA1C MFR BLD: 7.6 % (ref 0–5.6)
HDLC SERPL-MCNC: 33 MG/DL
LDLC SERPL CALC-MCNC: 106 MG/DL
NONHDLC SERPL-MCNC: 153 MG/DL
POTASSIUM SERPL-SCNC: 4.4 MMOL/L (ref 3.4–5.3)
PROT SERPL-MCNC: 6.9 G/DL (ref 6.8–8.8)
SODIUM SERPL-SCNC: 137 MMOL/L (ref 133–144)
TRIGL SERPL-MCNC: 235 MG/DL

## 2020-10-19 PROCEDURE — 83036 HEMOGLOBIN GLYCOSYLATED A1C: CPT | Performed by: FAMILY MEDICINE

## 2020-10-19 PROCEDURE — 80053 COMPREHEN METABOLIC PANEL: CPT | Performed by: FAMILY MEDICINE

## 2020-10-19 PROCEDURE — 80061 LIPID PANEL: CPT | Performed by: FAMILY MEDICINE

## 2020-10-19 PROCEDURE — 36415 COLL VENOUS BLD VENIPUNCTURE: CPT | Performed by: FAMILY MEDICINE

## 2020-11-03 ENCOUNTER — VIRTUAL VISIT (OUTPATIENT)
Dept: FAMILY MEDICINE | Facility: CLINIC | Age: 60
End: 2020-11-03
Payer: COMMERCIAL

## 2020-11-03 DIAGNOSIS — E11.9 TYPE 2 DIABETES MELLITUS WITHOUT COMPLICATION, WITHOUT LONG-TERM CURRENT USE OF INSULIN (H): ICD-10-CM

## 2020-11-03 DIAGNOSIS — F32.5 MAJOR DEPRESSIVE DISORDER, SINGLE EPISODE IN FULL REMISSION (H): ICD-10-CM

## 2020-11-03 DIAGNOSIS — F41.9 ANXIETY: Primary | ICD-10-CM

## 2020-11-03 DIAGNOSIS — Z71.89 COUNSELED ABOUT COVID-19 VIRUS INFECTION: ICD-10-CM

## 2020-11-03 DIAGNOSIS — E78.5 HYPERLIPIDEMIA LDL GOAL <100: ICD-10-CM

## 2020-11-03 PROCEDURE — 99214 OFFICE O/P EST MOD 30 MIN: CPT | Mod: 95 | Performed by: FAMILY MEDICINE

## 2020-11-03 PROCEDURE — 96127 BRIEF EMOTIONAL/BEHAV ASSMT: CPT | Mod: 95 | Performed by: FAMILY MEDICINE

## 2020-11-03 RX ORDER — ESCITALOPRAM OXALATE 10 MG/1
10 TABLET ORAL DAILY
Qty: 30 TABLET | Refills: 0 | Status: SHIPPED | OUTPATIENT
Start: 2020-11-03 | End: 2020-12-14

## 2020-11-03 ASSESSMENT — ANXIETY QUESTIONNAIRES
7. FEELING AFRAID AS IF SOMETHING AWFUL MIGHT HAPPEN: NEARLY EVERY DAY
3. WORRYING TOO MUCH ABOUT DIFFERENT THINGS: NEARLY EVERY DAY
IF YOU CHECKED OFF ANY PROBLEMS ON THIS QUESTIONNAIRE, HOW DIFFICULT HAVE THESE PROBLEMS MADE IT FOR YOU TO DO YOUR WORK, TAKE CARE OF THINGS AT HOME, OR GET ALONG WITH OTHER PEOPLE: SOMEWHAT DIFFICULT
1. FEELING NERVOUS, ANXIOUS, OR ON EDGE: NEARLY EVERY DAY
5. BEING SO RESTLESS THAT IT IS HARD TO SIT STILL: SEVERAL DAYS
GAD7 TOTAL SCORE: 17
6. BECOMING EASILY ANNOYED OR IRRITABLE: MORE THAN HALF THE DAYS
2. NOT BEING ABLE TO STOP OR CONTROL WORRYING: NEARLY EVERY DAY

## 2020-11-03 ASSESSMENT — PATIENT HEALTH QUESTIONNAIRE - PHQ9
SUM OF ALL RESPONSES TO PHQ QUESTIONS 1-9: 9
5. POOR APPETITE OR OVEREATING: MORE THAN HALF THE DAYS

## 2020-11-03 NOTE — PROGRESS NOTES
"Teresa Hahn is a 60 year old female who is being evaluated via a billable video visit.      [facility] - [phone]    The patient has been notified of following:     \"This video visit will be conducted via a call between you and your physician/provider. We have found that certain health care needs can be provided without the need for an in-person physical exam.  This service lets us provide the care you need with a video conversation.  If a prescription is necessary we can send it directly to your pharmacy.  If lab work is needed we can place an order for that and you can then stop by our lab to have the test done at a later time.    Video visits are billed at different rates depending on your insurance coverage.  Please reach out to your insurance provider with any questions.    If during the course of the call the physician/provider feels a video visit is not appropriate, you will not be charged for this service.\"    Patient has given verbal consent for Video visit? Yes  How would you like to obtain your AVS? MyChart  If you are dropped from the video visit, the video invite should be resent to: Text to cell phone: [phone]  Will anyone else be joining your video visit? No    Subjective     Teresa Hahn is a 60 year old female who presents today via video visit for the following health issues:    HPI     Abnormal Mood Symptoms  Onset/Duration: x summer  Description:   Depression (if yes, do PHQ-9): unknown  Anxiety (if yes, do CHAKA-7): YES  Accompanying Signs & Symptoms:  Still participating in activities that you used to enjoy: YES  Fatigue: no  Irritability: YES  Difficulty concentrating: YES-somewhat   Changes in appetite: no  Problems with sleep: YES  Heart racing/beating fast: no  Abnormally elevated, expansive, or irritable mood: YES  Persistently increased activity or energy: no  Thoughts of hurting yourself or others: no  History:  Recent stress or major life event: YES  Prior depression or anxiety: " yes  Family history of depression or anxiety: YES  Alcohol/drug use: no  Difficulty sleeping: YES  Precipitating or alleviating factors: none  Therapies tried and outcome: none  PHQ 1/11/2019 7/19/2019 2/27/2020   PHQ-9 Total Score 1 1 2   Q9: Thoughts of better off dead/self-harm past 2 weeks Not at all Not at all Not at all     CHAKA-7 SCORE 1/11/2019 7/19/2019 2/27/2020   Total Score - - -   Total Score - - -   Total Score 14 4 5          Video Start Time: 1:38 PM    See under ROS     Patient Active Problem List   Diagnosis     Acute reaction to stress     Arthropathy     GERD (gastroesophageal reflux disease)     Anxiety     Health Care Home     TBI (traumatic brain injury) (H)     ADD (attention deficit disorder)     Major depressive disorder, single episode in full remission (H)     Malignant neoplasm of right female breast (H)     Hyperlipidemia LDL goal <100     Type 2 diabetes mellitus without complication, without long-term current use of insulin (H)     Status post small bowel resection       Current Outpatient Medications   Medication Sig Dispense Refill     ACE/ARB/ARNI NOT PRESCRIBED, INTENTIONAL, Please choose reason not prescribed, below       aspirin (ASA) 81 MG tablet Take 1 tablet (81 mg) by mouth daily 90 tablet 3     blood glucose (NO BRAND SPECIFIED) lancets standard Use to test blood sugar 1 times daily or as directed 1 each 0     blood glucose (NO BRAND SPECIFIED) test strip Use to test blood sugars 1 times daily or as directed 100 strip 1     blood glucose monitoring (ACCU-CHEK SHERYL SMARTVIEW) meter device kit   0     blood glucose monitoring (JOLLY CONTOUR NEXT) test strip Use to test blood sugar 4 times daily or as directed.  Ok to substitute alternative if insurance prefers. 200 strip 1     blood glucose monitoring (NO BRAND SPECIFIED) meter device kit Use to test blood sugar 1 times daily or as directed.  Please give needed supplies as well, including lancets (#100), test strips (#100) and  control solution. 1 kit 0     exemestane (AROMASIN) 25 MG tablet Take 25 mg by mouth daily       metFORMIN (GLUCOPHAGE) 1000 MG tablet TAKE 1 TABLET TWICE DAILY  WITH MEALS 180 tablet 0     STATIN NOT PRESCRIBED, INTENTIONAL, Please choose reason not prescribed, below           Review of Systems   CONSTITUTIONAL:NEGATIVE for fever, chills  RESP:NEGATIVE for significant cough or SOB  CV: NEGATIVE for chest pain, palpitations or peripheral edema  PSYCHIATRIC: see below.    Wondering about anti anxiety medication.  Stressed with current world situation as well as some of her own family stresses.  Brother has passed.   Did vote today; was anxious with the set up; though she herself was very careful.    Believes was on effexor in the past.     She has been in counseling. She did suggest seeking out medication.            Objective           Vitals:  No vitals were obtained today due to virtual visit.    Physical Exam     GENERAL: Healthy, alert and no distress  EYES: Eyes grossly normal to inspection.  No discharge or erythema, or obvious scleral/conjunctival abnormalities.  RESP: No audible wheeze, cough, or visible cyanosis.  No visible retractions or increased work of breathing.    SKIN: Visible skin clear. No significant rash, abnormal pigmentation or lesions.  NEURO: Cranial nerves grossly intact.  Mentation and speech appropriate for age.  PSYCH: Mentation appears normal, affect normal/bright, judgement and insight intact, normal speech and appearance well-groomed.      PHQ-9 SCORE 7/19/2019 2/27/2020 11/3/2020   PHQ-9 Total Score - - -   PHQ-9 Total Score MyChart - - -   PHQ-9 Total Score 1 2 9       CHAKA-7 SCORE 7/19/2019 2/27/2020 11/3/2020   Total Score - - -   Total Score - - -   Total Score 4 5 17         Lab Results   Component Value Date    A1C 7.6 10/19/2020    A1C 8.4 02/27/2020    A1C 6.9 07/19/2019    A1C 6.5 01/11/2019    A1C 6.4 07/13/2018       Recent Labs   Lab Test 10/19/20  0844 01/11/19  0851    CHOL 186 185   HDL 33* 35*   * 100*   TRIG 235* 249*     Reviewed med list; has been sertraline, venlafaxine, citalopram, bupropion.            Assessment & Plan     Anxiety  She notes she has been anxious. Score reflects this.  Reviewed prior medications. Reviewed these, escitalopram, fluoxetine.   Will go with the following. She can take 1/2 pill the first few days to acclimate.  Discussed potential side effects, including transient drowsiness and weight gain. Rare possibility of arrhythmia (reviewed her prior EKG; no prolonged QT).  Also discussed potential for discontinuation effects.  - escitalopram (LEXAPRO) 10 MG tablet; Take 1 tablet (10 mg) by mouth daily    Major depressive disorder, single episode in full remission (H)  Score is mildly elevated on the depression aspect as well.    Type 2 diabetes mellitus without complication, without long-term current use of insulin (H)  Not at goal of < 7.  Improved from previously.   Continue to monitor at this time.     Hyperlipidemia LDL goal <100  discussed her lipids. Would recommend statin. Did not come to a conclusion on this.     Counseled about COVID-19 virus infection  discussed options for testing if she should become symptomatic.           Patient Instructions   Take 1/2 pill the first few days.   If you are doing well, then take the full pill.        Return in about 4 weeks (around 12/1/2020) for Medication recheck.    Yocasta Moreno MD, MD  [facility]      Video-Visit Details    Type of service:  Video Visit    Video End Time: 2:09 PM      Originating Location (pt. Location): Home    Distant Location (provider location):  [facility]     Platform used for Video Visit: [employer]

## 2020-11-04 ASSESSMENT — ANXIETY QUESTIONNAIRES: GAD7 TOTAL SCORE: 17

## 2020-12-12 DIAGNOSIS — F41.9 ANXIETY: ICD-10-CM

## 2020-12-14 RX ORDER — ESCITALOPRAM OXALATE 10 MG/1
TABLET ORAL
Qty: 30 TABLET | Refills: 0 | Status: SHIPPED | OUTPATIENT
Start: 2020-12-14 | End: 2021-01-21

## 2020-12-14 NOTE — TELEPHONE ENCOUNTER
Patient is completely out of medication and has appt with PCP on Friday, would like short refill to get to appointment.  -Katie Galaviz

## 2020-12-14 NOTE — TELEPHONE ENCOUNTER
Prescription approved per [facility] Refill Protocol.    Kori RALPH RN, BSN

## 2020-12-18 ENCOUNTER — VIRTUAL VISIT (OUTPATIENT)
Dept: FAMILY MEDICINE | Facility: CLINIC | Age: 60
End: 2020-12-18
Payer: COMMERCIAL

## 2020-12-18 DIAGNOSIS — E78.5 HYPERLIPIDEMIA LDL GOAL <100: ICD-10-CM

## 2020-12-18 DIAGNOSIS — E11.9 TYPE 2 DIABETES MELLITUS WITHOUT COMPLICATION, WITHOUT LONG-TERM CURRENT USE OF INSULIN (H): ICD-10-CM

## 2020-12-18 DIAGNOSIS — F41.9 ANXIETY: Primary | ICD-10-CM

## 2020-12-18 PROCEDURE — 99214 OFFICE O/P EST MOD 30 MIN: CPT | Mod: 95 | Performed by: FAMILY MEDICINE

## 2020-12-18 RX ORDER — VENLAFAXINE HYDROCHLORIDE 75 MG/1
75 TABLET, EXTENDED RELEASE ORAL DAILY
Qty: 90 TABLET | Refills: 0 | Status: SHIPPED | OUTPATIENT
Start: 2020-12-18 | End: 2020-12-28

## 2020-12-18 ASSESSMENT — ANXIETY QUESTIONNAIRES
GAD7 TOTAL SCORE: 10
3. WORRYING TOO MUCH ABOUT DIFFERENT THINGS: MORE THAN HALF THE DAYS
5. BEING SO RESTLESS THAT IT IS HARD TO SIT STILL: NOT AT ALL
IF YOU CHECKED OFF ANY PROBLEMS ON THIS QUESTIONNAIRE, HOW DIFFICULT HAVE THESE PROBLEMS MADE IT FOR YOU TO DO YOUR WORK, TAKE CARE OF THINGS AT HOME, OR GET ALONG WITH OTHER PEOPLE: SOMEWHAT DIFFICULT
6. BECOMING EASILY ANNOYED OR IRRITABLE: SEVERAL DAYS
2. NOT BEING ABLE TO STOP OR CONTROL WORRYING: MORE THAN HALF THE DAYS
1. FEELING NERVOUS, ANXIOUS, OR ON EDGE: MORE THAN HALF THE DAYS
7. FEELING AFRAID AS IF SOMETHING AWFUL MIGHT HAPPEN: NEARLY EVERY DAY

## 2020-12-18 ASSESSMENT — PATIENT HEALTH QUESTIONNAIRE - PHQ9
SUM OF ALL RESPONSES TO PHQ QUESTIONS 1-9: 3
5. POOR APPETITE OR OVEREATING: NOT AT ALL

## 2020-12-18 NOTE — PROGRESS NOTES
"Teresa Hahn is a 60 year old female who is being evaluated via a billable video visit.      The patient has been notified of following:     \"This video visit will be conducted via a call between you and your physician/provider. We have found that certain health care needs can be provided without the need for an in-person physical exam.  This service lets us provide the care you need with a video conversation.  If a prescription is necessary we can send it directly to your pharmacy.  If lab work is needed we can place an order for that and you can then stop by our lab to have the test done at a later time.    Video visits are billed at different rates depending on your insurance coverage.  Please reach out to your insurance provider with any questions.    If during the course of the call the physician/provider feels a video visit is not appropriate, you will not be charged for this service.\"    Patient has given verbal consent for Video visit? Yes  How would you like to obtain your AVS? MyChart  If you are dropped from the video visit, the video invite should be resent to: Text to cell phone: [phone]  Will anyone else be joining your video visit? No    Subjective     Teresa Hahn is a 60 year old female who presents today via video visit for the following health issues:    HPI     Medication Followup of Lexapro     Taking Medication as prescribed: yes    Side Effects:  None    Medication Helping Symptoms: somewhat effective           Video Start Time: 12:34 PM    See under ROS     Patient Active Problem List   Diagnosis     Acute reaction to stress     Arthropathy     GERD (gastroesophageal reflux disease)     Anxiety     Health Care Home     TBI (traumatic brain injury) (H)     ADD (attention deficit disorder)     Major depressive disorder, single episode in full remission (H)     Malignant neoplasm of right female breast (H)     Hyperlipidemia LDL goal <100     Type 2 diabetes mellitus without complication, " without long-term current use of insulin (H)     Status post small bowel resection       Current Outpatient Medications   Medication Sig Dispense Refill     ACE/ARB/ARNI NOT PRESCRIBED, INTENTIONAL, Please choose reason not prescribed, below       aspirin (ASA) 81 MG tablet Take 1 tablet (81 mg) by mouth daily 90 tablet 3     blood glucose (NO BRAND SPECIFIED) lancets standard Use to test blood sugar 1 times daily or as directed 1 each 0     blood glucose (NO BRAND SPECIFIED) test strip Use to test blood sugars 1 times daily or as directed 100 strip 1     blood glucose monitoring (ACCU-CHEK SHERYL SMARTVIEW) meter device kit   0     blood glucose monitoring (JOLLY CONTOUR NEXT) test strip Use to test blood sugar 4 times daily or as directed.  Ok to substitute alternative if insurance prefers. 200 strip 1     blood glucose monitoring (NO BRAND SPECIFIED) meter device kit Use to test blood sugar 1 times daily or as directed.  Please give needed supplies as well, including lancets (#100), test strips (#100) and control solution. 1 kit 0     escitalopram (LEXAPRO) 10 MG tablet TAKE 1 TABLET(10 MG) BY MOUTH DAILY 30 tablet 0     exemestane (AROMASIN) 25 MG tablet Take 25 mg by mouth daily       metFORMIN (GLUCOPHAGE) 1000 MG tablet TAKE 1 TABLET TWICE DAILY  WITH MEALS 180 tablet 0     STATIN NOT PRESCRIBED, INTENTIONAL, Please choose reason not prescribed, below           Review of Systems   Constitutional, neuro, ENT, endocrine, pulmonary, cardiac, gastrointestinal, genitourinary, musculoskeletal, integument and psychiatric systems are negative, except as otherwise noted.  ENT/MOUTH: NEGATIVE for ear, mouth and throat problems  RESP:NEGATIVE for significant cough or SOB  PSYCHIATRIC: see below      Feeling the need for a medication change. She notes this is not fully effective.  It is helping with sleep.    Wondering about a different medication. Wondering about a different kind.    Asking about her past medications.        Objective           Vitals:  No vitals were obtained today due to virtual visit.    Physical Exam     GENERAL: Healthy, alert and no distress  EYES: Eyes grossly normal to inspection.  No discharge or erythema, or obvious scleral/conjunctival abnormalities.  RESP: No audible wheeze, cough, or visible cyanosis.  No visible retractions or increased work of breathing.    SKIN: Visible skin clear. No significant rash, abnormal pigmentation or lesions.  NEURO: Cranial nerves grossly intact.  Mentation and speech appropriate for age.  PSYCH: Mentation appears normal, affect normal/bright, judgement and insight intact, normal speech and appearance well-groomed.    PHQ-9 SCORE 2/27/2020 11/3/2020 12/18/2020   PHQ-9 Total Score - - -   PHQ-9 Total Score MyChart - - -   PHQ-9 Total Score 2 9 3       CHAKA-7 SCORE 2/27/2020 11/3/2020 12/18/2020   Total Score - - -   Total Score - - -   Total Score 5 17 10     Lab Results   Component Value Date    A1C 7.6 10/19/2020    A1C 8.4 02/27/2020    A1C 6.9 07/19/2019    A1C 6.5 01/11/2019    A1C 6.4 07/13/2018     Recent Labs   Lab Test 10/19/20  0844 01/11/19  0851   CHOL 186 185   HDL 33* 35*   * 100*   TRIG 235* 249*     Reviewed her medications historically .            Assessment & Plan     Anxiety  Scores do show improvement; but still a little elevated.  Discussed some options. Could increase dose of current medication; does look like it has helped some.  After review of prior medications, she did want to try venlafaxine again. Discussed potential side effects, including discontinuation effects.   Will give this a try.   - venlafaxine (EFFEXOR-ER) 75 MG 24 hr tablet; Take 1 tablet (75 mg) by mouth daily    Type 2 diabetes mellitus without complication, without long-term current use of insulin (H)  Last HgbA1C was improved, but still not at optimal dosing.  - metFORMIN (GLUCOPHAGE) 1000 MG tablet; TAKE 1 TABLET TWICE DAILY  WITH MEALS  - Lipid panel reflex to direct  LDL Fasting; Future  - Hemoglobin A1c; Future  - Albumin Random Urine Quantitative with Creat Ratio; Future    Hyperlipidemia LDL goal <100  Discussed statin briefly; she has been reluctant in the past; mostly due to other new medications. Will check fasting lipid again next visit.  - Lipid panel reflex to direct LDL Fasting; Future          Return in about 6 weeks (around 1/29/2021) for Diabetes Recheck, Medication recheck.    Yocasta Moreno MD, MD  [facility]      Video-Visit Details    Type of service:  Video Visit    Video End Time:1:03 PM    Originating Location (pt. Location): Home    Distant Location (provider location):  [facility]     Platform used for Video Visit: [employer]

## 2020-12-19 ASSESSMENT — ANXIETY QUESTIONNAIRES: GAD7 TOTAL SCORE: 10

## 2020-12-22 ENCOUNTER — TRANSFERRED RECORDS (OUTPATIENT)
Dept: HEALTH INFORMATION MANAGEMENT | Facility: CLINIC | Age: 60
End: 2020-12-22

## 2020-12-23 DIAGNOSIS — E11.9 TYPE 2 DIABETES MELLITUS WITHOUT COMPLICATION, WITHOUT LONG-TERM CURRENT USE OF INSULIN (H): ICD-10-CM

## 2020-12-28 ENCOUNTER — TELEPHONE (OUTPATIENT)
Dept: FAMILY MEDICINE | Facility: CLINIC | Age: 60
End: 2020-12-28

## 2020-12-28 DIAGNOSIS — F41.9 ANXIETY: ICD-10-CM

## 2020-12-28 RX ORDER — VENLAFAXINE HYDROCHLORIDE 75 MG/1
75 CAPSULE, EXTENDED RELEASE ORAL DAILY
Qty: 90 CAPSULE | Refills: 0 | Status: SHIPPED | OUTPATIENT
Start: 2020-12-28 | End: 2021-04-06

## 2020-12-28 NOTE — TELEPHONE ENCOUNTER
Patient's insurance does not cover the tablets but will cover the capsules. Please either send new Rx for caps or advise if you'd like us to start a prior authorization. Advise patient with providers response.  -Katie Galaviz

## 2021-01-09 ENCOUNTER — HEALTH MAINTENANCE LETTER (OUTPATIENT)
Age: 61
End: 2021-01-09

## 2021-01-18 DIAGNOSIS — E11.9 TYPE 2 DIABETES MELLITUS WITHOUT COMPLICATION, WITHOUT LONG-TERM CURRENT USE OF INSULIN (H): ICD-10-CM

## 2021-01-18 DIAGNOSIS — E78.5 HYPERLIPIDEMIA LDL GOAL <100: ICD-10-CM

## 2021-01-18 LAB
CHOLEST SERPL-MCNC: 212 MG/DL
HBA1C MFR BLD: 8.4 % (ref 0–5.6)
HDLC SERPL-MCNC: 30 MG/DL
LDLC SERPL CALC-MCNC: 125 MG/DL
NONHDLC SERPL-MCNC: 182 MG/DL
TRIGL SERPL-MCNC: 283 MG/DL

## 2021-01-18 PROCEDURE — 80061 LIPID PANEL: CPT | Performed by: FAMILY MEDICINE

## 2021-01-18 PROCEDURE — 83036 HEMOGLOBIN GLYCOSYLATED A1C: CPT | Performed by: FAMILY MEDICINE

## 2021-01-18 PROCEDURE — 82043 UR ALBUMIN QUANTITATIVE: CPT | Performed by: FAMILY MEDICINE

## 2021-01-18 PROCEDURE — 36415 COLL VENOUS BLD VENIPUNCTURE: CPT | Performed by: FAMILY MEDICINE

## 2021-01-20 LAB
CREAT UR-MCNC: 342 MG/DL
MICROALBUMIN UR-MCNC: 51 MG/L
MICROALBUMIN/CREAT UR: 14.91 MG/G CR (ref 0–25)

## 2021-01-21 ENCOUNTER — VIRTUAL VISIT (OUTPATIENT)
Dept: FAMILY MEDICINE | Facility: CLINIC | Age: 61
End: 2021-01-21
Payer: COMMERCIAL

## 2021-01-21 DIAGNOSIS — R68.89 SENSATION OF FEELING HOT: ICD-10-CM

## 2021-01-21 DIAGNOSIS — F41.9 ANXIETY: ICD-10-CM

## 2021-01-21 DIAGNOSIS — E11.9 TYPE 2 DIABETES MELLITUS WITHOUT COMPLICATION, WITHOUT LONG-TERM CURRENT USE OF INSULIN (H): Primary | ICD-10-CM

## 2021-01-21 DIAGNOSIS — F32.5 MAJOR DEPRESSIVE DISORDER, SINGLE EPISODE IN FULL REMISSION (H): ICD-10-CM

## 2021-01-21 DIAGNOSIS — R61 SWEATING INCREASE: ICD-10-CM

## 2021-01-21 DIAGNOSIS — E78.5 HYPERLIPIDEMIA LDL GOAL <100: ICD-10-CM

## 2021-01-21 PROCEDURE — 96127 BRIEF EMOTIONAL/BEHAV ASSMT: CPT | Performed by: FAMILY MEDICINE

## 2021-01-21 PROCEDURE — 99215 OFFICE O/P EST HI 40 MIN: CPT | Mod: 95 | Performed by: FAMILY MEDICINE

## 2021-01-21 RX ORDER — ATORVASTATIN CALCIUM 10 MG/1
10 TABLET, FILM COATED ORAL DAILY
Qty: 90 TABLET | Refills: 1 | Status: SHIPPED | OUTPATIENT
Start: 2021-01-21 | End: 2021-05-26

## 2021-01-21 ASSESSMENT — ANXIETY QUESTIONNAIRES
7. FEELING AFRAID AS IF SOMETHING AWFUL MIGHT HAPPEN: SEVERAL DAYS
3. WORRYING TOO MUCH ABOUT DIFFERENT THINGS: SEVERAL DAYS
IF YOU CHECKED OFF ANY PROBLEMS ON THIS QUESTIONNAIRE, HOW DIFFICULT HAVE THESE PROBLEMS MADE IT FOR YOU TO DO YOUR WORK, TAKE CARE OF THINGS AT HOME, OR GET ALONG WITH OTHER PEOPLE: SOMEWHAT DIFFICULT
2. NOT BEING ABLE TO STOP OR CONTROL WORRYING: SEVERAL DAYS
1. FEELING NERVOUS, ANXIOUS, OR ON EDGE: SEVERAL DAYS
5. BEING SO RESTLESS THAT IT IS HARD TO SIT STILL: NOT AT ALL
GAD7 TOTAL SCORE: 5
6. BECOMING EASILY ANNOYED OR IRRITABLE: SEVERAL DAYS

## 2021-01-21 ASSESSMENT — PATIENT HEALTH QUESTIONNAIRE - PHQ9
SUM OF ALL RESPONSES TO PHQ QUESTIONS 1-9: 3
5. POOR APPETITE OR OVEREATING: NOT AT ALL

## 2021-01-21 NOTE — PATIENT INSTRUCTIONS
I did put in a referral for our MTM (Medication Therapeutic Management) pharmacist.  They should contact you to schedule and it is my understanding that they do look to see if insurance covers this service.   If not, I believe the visit is ~  $150.00; but you could ask them for sure.    We could also think of a diabetic education refresher.    We are starting atorvastatin.       Regarding adding on a medication in addition to metormin, for the diabetes (glucose control), below are some options:    GLP-1 agonists:  These are mostly injectible, but there is one oral medication (one form of semaglutide).  They can often help with weight loss.  Many of the injectible options are available as once per week.  Many of these have been shown to decrease the risk for heart disease and stroke.  Eg) liraglutide, semaglutide, dulaglutide, exenatide, lixisenatide    SGLT2 inhibitors:  These are oral medications.  They can help with weight loss.  These may also help lower heart disease and stroke risks.  These may be beneficial in chronic kidney diease.  Eg) empagliflozin, canaglifozin, dapagliflozin    DPP-IV inhibitors  These are oral medications.  These tend to be weight neutral.  There may be an increase in heart failure.    Insulin:  These are primarily injectible.  These can be associated with weight gain.    TZD's  These are oral medications.  These can cause weight gain.  These may help reduce stroke risk.  They should be used with caution in heart failure.  Eg) pioglitazone    Sulfonylureas:  These are low cost.  They are oral medications.  These can cause some weight gain.  They may increase risk of heart disease.  Eg) glimepiride, glipizide, glyburide

## 2021-01-21 NOTE — PROGRESS NOTES
Teresa is a 60 year old who is being evaluated via a billable video visit.      How would you like to obtain your AVS? MyChart  If the video visit is dropped, the invitation should be resent by: Send to e-mail at: [email or web address]  Will anyone else be joining your video visit? No      Video Start Time: 11:50 AM  Assessment & Plan     Type 2 diabetes mellitus without complication, without long-term current use of insulin (H)  Not at optimal control. She did note she is overall OK with this number with some of her recent habits. Encourage working on lifestyle, but also reviewed past numbers as well.  May wish to think about additional treatment. Did discuss some briefly. Discussed MTM as option to look into more. Discussed some pros/cons of different treatments here. She did request to have some of this spelled out more in AVS so she can do some of her own research/discussion with insurance company.   Also discussed diabetic education refresher; they could help some with dietary modification as well. Did recommend checking some sugars.  Also see patient instructions.   - MED THERAPY MANAGE REFERRAL    Hyperlipidemia LDL goal <100  She has been reluctant to start medication in the past, but is willing to at this time.  Discussed potential side effects; including muscle pain, rhabdomyolysis (rare but very serious).  Will start with this.   - atorvastatin (LIPITOR) 10 MG tablet; Take 1 tablet (10 mg) by mouth daily    Major depressive disorder, single episode in full remission (H)  Scores are actually looking fairly good. But see her notation; giving more time to see how this is working. She has noted more sleep disturbance.    Anxiety  As above.     Sensation of feeling hot  Uncertain etiology.   Possibly menopausal; but not sure of timing just more recently.   Could consider endocrinology.    Sweating increase  As above.                60+ minutes spent on the date of the encounter doing chart review, review of test  "results, patient visit and documentation          BMI:   Estimated body mass index is 31.4 kg/m  as calculated from the following:    Height as of 2/27/20: 1.549 m (5' 1\").    Weight as of 2/27/20: 75.4 kg (166 lb 3.2 oz).   Weight management plan: Discussed healthy diet and exercise guidelines      See Patient Instructions    Return in about 3 months (around 4/21/2021), or if symptoms worsen or fail to improve, for Diabetes Recheck; fasting lab. MTM or myself earlier regarding possible add on medications..    Yocasta Moreno MD, MD  [facility] TERRELL Moore is a 60 year old who presents to clinic today for the following health issues     HPI       Diabetes Follow-up      How often are you checking your blood sugar? Not at all    What concerns do you have today about your diabetes? None     Do you have any of these symptoms? (Select all that apply)  Burning in feet and Excessive thirst    Have you had a diabetic eye exam in the last 12 months? No- due to Covid 19        BP Readings from Last 2 Encounters:   02/27/20 112/78   07/19/19 117/80     Hemoglobin A1C (%)   Date Value   01/18/2021 8.4 (H)   10/19/2020 7.6 (H)     LDL Cholesterol Calculated (mg/dL)   Date Value   01/18/2021 125 (H)   10/19/2020 106 (H)               Anxiety Follow-Up    How are you doing with your anxiety since your last visit? No change    Are you having other symptoms that might be associated with anxiety? No    Have you had a significant life event? No     Are you feeling depressed? No    Do you have any concerns with your use of alcohol or other drugs? No    Social History     Tobacco Use     Smoking status: Never Smoker     Smokeless tobacco: Never Used   Substance Use Topics     Alcohol use: Yes     Comment: 1/month      Drug use: No     CHAKA-7 SCORE 2/27/2020 11/3/2020 12/18/2020   Total Score - - -   Total Score - - -   Total Score 5 17 10     PHQ 2/27/2020 11/3/2020 12/18/2020   PHQ-9 Total Score 2 9 3 "   Q9: Thoughts of better off dead/self-harm past 2 weeks Not at all Not at all Not at all           How many servings of fruits and vegetables do you eat daily?  2-3    On average, how many sweetened beverages do you drink each day (Examples: soda, juice, sweet tea, etc.  Do NOT count diet or artificially sweetened beverages)?   0    How many days per week do you exercise enough to make your heart beat faster? 3 or less- none     How many minutes a day do you exercise enough to make your heart beat faster? none    How many days per week do you miss taking your medication? 0    See under ROS     Patient Active Problem List   Diagnosis     Acute reaction to stress     Arthropathy     GERD (gastroesophageal reflux disease)     Anxiety     Health Care Home     TBI (traumatic brain injury) (H)     ADD (attention deficit disorder)     Major depressive disorder, single episode in full remission (H)     Malignant neoplasm of right female breast (H)     Hyperlipidemia LDL goal <100     Type 2 diabetes mellitus without complication, without long-term current use of insulin (H)     Status post small bowel resection       Current Outpatient Medications   Medication Sig Dispense Refill     ACE/ARB/ARNI NOT PRESCRIBED, INTENTIONAL, Please choose reason not prescribed, below       aspirin (ASA) 81 MG tablet Take 1 tablet (81 mg) by mouth daily 90 tablet 3             blood glucose (NO BRAND SPECIFIED) lancets standard Use to test blood sugar 1 times daily or as directed 1 each 0     blood glucose (NO BRAND SPECIFIED) test strip Use to test blood sugars 1 times daily or as directed 100 strip 1     blood glucose monitoring (ACCU-CHEK SHERYL SMARTVIEW) meter device kit   0     blood glucose monitoring (JOLLY CONTOUR NEXT) test strip Use to test blood sugar 4 times daily or as directed.  Ok to substitute alternative if insurance prefers. 200 strip 1     blood glucose monitoring (NO BRAND SPECIFIED) meter device kit Use to test blood  sugar 1 times daily or as directed.  Please give needed supplies as well, including lancets (#100), test strips (#100) and control solution. 1 kit 0     exemestane (AROMASIN) 25 MG tablet Take 25 mg by mouth daily       metFORMIN (GLUCOPHAGE) 1000 MG tablet TAKE 1 TABLET TWICE DAILY  WITH MEALS 180 tablet 0     STATIN NOT PRESCRIBED, INTENTIONAL, Please choose reason not prescribed, below       venlafaxine (EFFEXOR-XR) 75 MG 24 hr capsule Take 1 capsule (75 mg) by mouth daily 90 capsule 0         Review of Systems   CONSTITUTIONAL:NEGATIVE for fever, chills, change in weight  ENT/MOUTH: NEGATIVE for ear, mouth and throat problems  RESP:NEGATIVE for significant cough or SOB  CV: NEGATIVE for chest pain, palpitations or peripheral edema  PSYCHIATRIC: NEGATIVE for changes in mood or affect      Wants to blame winter and pandemic for the HgbA1C  She notes she is eating more comfort foods.    Early last year found out MTM was not covered by her insurance.  Having some excessive thirst.  Not checking sugars much.    Notes mood is complicated. Did not start the new medication right away; has only been on this now for a couple weeks.   Sleep has been more disturbed.  Would like to give more time to see how it is truly working.    Not feverish; but notes will put ice pack on her skin and the ice pack melts quickly.  Whole body feels hot. This is not all the time, but will get this every couple hours.  Sweats. More in the last month.             Objective           Vitals:  No vitals were obtained today due to virtual visit.    Physical Exam   GENERAL: Healthy, alert and no distress  EYES: Eyes grossly normal to inspection.  No discharge or erythema, or obvious scleral/conjunctival abnormalities.  RESP: No audible wheeze, cough, or visible cyanosis.  No visible retractions or increased work of breathing.    SKIN: Visible skin clear. No significant rash, abnormal pigmentation or lesions.  NEURO: Cranial nerves grossly intact.   Mentation and speech appropriate for age.  PSYCH: Mentation appears normal, affect normal/bright, judgement and insight intact, normal speech and appearance well-groomed.    PHQ-9 SCORE 11/3/2020 12/18/2020 1/21/2021   PHQ-9 Total Score - - -   PHQ-9 Total Score MyChart - - -   PHQ-9 Total Score 9 3 3       CHAKA-7 SCORE 11/3/2020 12/18/2020 1/21/2021   Total Score - - -   Total Score - - -   Total Score 17 10 5           Component      Latest Ref Rng & Units 1/18/2021   Cholesterol      <200 mg/dL 212 (H)   Triglycerides      <150 mg/dL 283 (H)   HDL Cholesterol      >49 mg/dL 30 (L)   LDL Cholesterol Calculated      <100 mg/dL 125 (H)   Non HDL Cholesterol      <130 mg/dL 182 (H)   Creatinine Urine      mg/dL 342   Albumin Urine mg/L      mg/L 51   Albumin Urine mg/g Cr      0 - 25 mg/g Cr 14.91   Hemoglobin A1C      0 - 5.6 % 8.4 (H)       Lab Results   Component Value Date    A1C 8.4 01/18/2021    A1C 7.6 10/19/2020    A1C 8.4 02/27/2020    A1C 6.9 07/19/2019    A1C 6.5 01/11/2019     Recent Labs   Lab Test 01/18/21  0837 10/19/20  0844   CHOL 212* 186   HDL 30* 33*   * 106*   TRIG 283* 235*     TSH   Date Value Ref Range Status   02/27/2020 0.80 0.40 - 4.00 mU/L Final                   Video-Visit Details    Type of service:  Video Visit    Video End Time:12:20 PM    Originating Location (pt. Location): Home    [facility]     Platform used for Video Visit: Vanna   Did cut in and out some; lost her at one point and then got reconnected.

## 2021-01-22 ASSESSMENT — ANXIETY QUESTIONNAIRES: GAD7 TOTAL SCORE: 5

## 2021-01-25 ENCOUNTER — TELEPHONE (OUTPATIENT)
Dept: FAMILY MEDICINE | Facility: CLINIC | Age: 61
End: 2021-01-25

## 2021-01-25 DIAGNOSIS — E11.9 TYPE 2 DIABETES MELLITUS WITHOUT COMPLICATION, WITHOUT LONG-TERM CURRENT USE OF INSULIN (H): Primary | ICD-10-CM

## 2021-01-25 NOTE — TELEPHONE ENCOUNTER
Please call her. I did give her some information on her AVS... she was going to at least look into insurance options. We talked some about it.  Then we can talk again to answer questions and see what she wants to do...    The other option may be diabetic education, which was also mentioned at her last visit.    See if she would like a referral for that.    Yocasta Moreno MD

## 2021-01-25 NOTE — TELEPHONE ENCOUNTER
MTM referral from: [facility] visit (referral by provider)    MTM referral outreach attempt #2 on January 25, 2021 at 2:50 PM      Outcome: Patient is not interested at this time because of cost. Due to insurance patient is private pay. Patient would a call back to see if there are other sources or ways she can get help with this concern. , will route to MTM Pharmacist/Provider as an FYI. Thank you for the referral.     Fazal Palacio, MTM coordinator

## 2021-01-26 NOTE — TELEPHONE ENCOUNTER
Called patient and relayed message below. She would like a referral for the diabetic educator.     Alis Pitt RN on 1/26/2021 at 3:02 PM

## 2021-01-27 ENCOUNTER — TELEPHONE (OUTPATIENT)
Dept: FAMILY MEDICINE | Facility: CLINIC | Age: 61
End: 2021-01-27

## 2021-01-27 NOTE — TELEPHONE ENCOUNTER
Diabetes Education Scheduling Outreach #1:    Call to patient to schedule. Left message with phone number to call to schedule.    Plan for 2nd outreach attempt within 1 week.    Rashmi Sales  [place] [facility]  Diabetes and Nutrition Scheduling

## 2021-02-02 ENCOUNTER — TRANSFERRED RECORDS (OUTPATIENT)
Dept: HEALTH INFORMATION MANAGEMENT | Facility: CLINIC | Age: 61
End: 2021-02-02

## 2021-02-02 LAB — RETINOPATHY: NEGATIVE

## 2021-02-03 NOTE — TELEPHONE ENCOUNTER
Diabetes Education Scheduling Outreach #2:    Call to patient to schedule. Left message with phone number to call to schedule.      Kathy Pablo  [place] [facility]  Diabetes and Nutrition Scheduling

## 2021-02-08 ENCOUNTER — TRANSFERRED RECORDS (OUTPATIENT)
Dept: HEALTH INFORMATION MANAGEMENT | Facility: CLINIC | Age: 61
End: 2021-02-08

## 2021-03-13 ENCOUNTER — HEALTH MAINTENANCE LETTER (OUTPATIENT)
Age: 61
End: 2021-03-13

## 2021-04-05 DIAGNOSIS — F41.9 ANXIETY: ICD-10-CM

## 2021-04-05 DIAGNOSIS — E11.9 TYPE 2 DIABETES MELLITUS WITHOUT COMPLICATION, WITHOUT LONG-TERM CURRENT USE OF INSULIN (H): ICD-10-CM

## 2021-04-06 RX ORDER — VENLAFAXINE HYDROCHLORIDE 75 MG/1
CAPSULE, EXTENDED RELEASE ORAL
Qty: 90 CAPSULE | Refills: 0 | Status: SHIPPED | OUTPATIENT
Start: 2021-04-06 | End: 2021-05-25

## 2021-04-06 NOTE — TELEPHONE ENCOUNTER
LMTCB- patient due for an appt. Will forward to TC's to continue to assist with scheduling.     Alis Pitt RN on 4/6/2021 at 3:33 PM

## 2021-04-06 NOTE — TELEPHONE ENCOUNTER
Prescription approved per [facility] protocol.    Alis Pitt RN on 4/6/2021 at 3:42 PM

## 2021-04-06 NOTE — TELEPHONE ENCOUNTER
PT scheduled with PCP on 5/25/21, insurance will only cover 90 day supply. Needs ASAP.  -Katie Galaviz

## 2021-05-24 DIAGNOSIS — E11.9 TYPE 2 DIABETES MELLITUS WITHOUT COMPLICATION, WITHOUT LONG-TERM CURRENT USE OF INSULIN (H): ICD-10-CM

## 2021-05-24 DIAGNOSIS — E78.5 HYPERLIPIDEMIA LDL GOAL <100: Primary | ICD-10-CM

## 2021-05-25 ENCOUNTER — OFFICE VISIT (OUTPATIENT)
Dept: FAMILY MEDICINE | Facility: CLINIC | Age: 61
End: 2021-05-25
Payer: COMMERCIAL

## 2021-05-25 VITALS
OXYGEN SATURATION: 97 % | DIASTOLIC BLOOD PRESSURE: 78 MMHG | RESPIRATION RATE: 16 BRPM | HEART RATE: 90 BPM | HEIGHT: 61 IN | SYSTOLIC BLOOD PRESSURE: 126 MMHG | BODY MASS INDEX: 30.23 KG/M2 | TEMPERATURE: 98.8 F | WEIGHT: 160.1 LBS

## 2021-05-25 DIAGNOSIS — E78.5 HYPERLIPIDEMIA LDL GOAL <100: ICD-10-CM

## 2021-05-25 DIAGNOSIS — F41.9 ANXIETY: ICD-10-CM

## 2021-05-25 DIAGNOSIS — Z85.3 HX: BREAST CANCER: ICD-10-CM

## 2021-05-25 DIAGNOSIS — E11.9 TYPE 2 DIABETES MELLITUS WITHOUT COMPLICATION, WITHOUT LONG-TERM CURRENT USE OF INSULIN (H): Primary | ICD-10-CM

## 2021-05-25 LAB
ALT SERPL W P-5'-P-CCNC: 67 U/L (ref 0–50)
CHOLEST SERPL-MCNC: 143 MG/DL
HBA1C MFR BLD: 9.5 % (ref 0–5.6)
HDLC SERPL-MCNC: 31 MG/DL
LDLC SERPL CALC-MCNC: 58 MG/DL
NONHDLC SERPL-MCNC: 112 MG/DL
TRIGL SERPL-MCNC: 269 MG/DL

## 2021-05-25 PROCEDURE — 36415 COLL VENOUS BLD VENIPUNCTURE: CPT | Performed by: FAMILY MEDICINE

## 2021-05-25 PROCEDURE — 99207 PR FOOT EXAM NO CHARGE: CPT | Mod: 25 | Performed by: FAMILY MEDICINE

## 2021-05-25 PROCEDURE — 96127 BRIEF EMOTIONAL/BEHAV ASSMT: CPT | Performed by: FAMILY MEDICINE

## 2021-05-25 PROCEDURE — 84460 ALANINE AMINO (ALT) (SGPT): CPT | Performed by: FAMILY MEDICINE

## 2021-05-25 PROCEDURE — 83036 HEMOGLOBIN GLYCOSYLATED A1C: CPT | Performed by: FAMILY MEDICINE

## 2021-05-25 PROCEDURE — 80061 LIPID PANEL: CPT | Performed by: FAMILY MEDICINE

## 2021-05-25 PROCEDURE — 99214 OFFICE O/P EST MOD 30 MIN: CPT | Performed by: FAMILY MEDICINE

## 2021-05-25 RX ORDER — VENLAFAXINE HYDROCHLORIDE 75 MG/1
75 CAPSULE, EXTENDED RELEASE ORAL DAILY
Qty: 90 CAPSULE | Refills: 1 | Status: SHIPPED | OUTPATIENT
Start: 2021-05-25 | End: 2021-06-14 | Stop reason: DRUGHIGH

## 2021-05-25 ASSESSMENT — MIFFLIN-ST. JEOR: SCORE: 1233.59

## 2021-05-25 ASSESSMENT — PATIENT HEALTH QUESTIONNAIRE - PHQ9
SUM OF ALL RESPONSES TO PHQ QUESTIONS 1-9: 1
5. POOR APPETITE OR OVEREATING: NOT AT ALL

## 2021-05-25 ASSESSMENT — ANXIETY QUESTIONNAIRES
7. FEELING AFRAID AS IF SOMETHING AWFUL MIGHT HAPPEN: SEVERAL DAYS
IF YOU CHECKED OFF ANY PROBLEMS ON THIS QUESTIONNAIRE, HOW DIFFICULT HAVE THESE PROBLEMS MADE IT FOR YOU TO DO YOUR WORK, TAKE CARE OF THINGS AT HOME, OR GET ALONG WITH OTHER PEOPLE: NOT DIFFICULT AT ALL
5. BEING SO RESTLESS THAT IT IS HARD TO SIT STILL: NOT AT ALL
GAD7 TOTAL SCORE: 3
1. FEELING NERVOUS, ANXIOUS, OR ON EDGE: SEVERAL DAYS
6. BECOMING EASILY ANNOYED OR IRRITABLE: NOT AT ALL
3. WORRYING TOO MUCH ABOUT DIFFERENT THINGS: SEVERAL DAYS
2. NOT BEING ABLE TO STOP OR CONTROL WORRYING: NOT AT ALL

## 2021-05-25 ASSESSMENT — PAIN SCALES - GENERAL: PAINLEVEL: NO PAIN (0)

## 2021-05-25 NOTE — PROGRESS NOTES
Assessment & Plan     Type 2 diabetes mellitus without complication, without long-term current use of insulin (H)  Not at optimal control. Has progressed from previously. Discussed.   We did discuss ryblesus, Farxiga, Invokana. She will plan to do a one time [facility] visit and pay out of pocket to review options further.   Recommend follow up in 3-4 months.   - Hemoglobin A1c  - metFORMIN (GLUCOPHAGE) 1000 MG tablet; TAKE 1 TABLET TWICE DAILY  WITH MEALS  - FOOT EXAM    Hyperlipidemia LDL goal <100  Recently agreed to start statin. Will check lab. Discussed possible adjustment in dose.   - ALT  - Lipid panel reflex to direct LDL Non-fasting    Anxiety  Reviewed her scores. Overall doing well. No change in medication.   - venlafaxine (EFFEXOR-XR) 75 MG 24 hr capsule; Take 1 capsule (75 mg) by mouth daily    HX: breast cancer  She does report she had a mammogram in December at [facility].                  Return in about 3 months (around 8/25/2021) for Diabetes Recheck.  She anticipates looking for a clinic closer to home, outside of our system.    Yocasta Moreno MD, MD  [facility] TERRELL Moore is a 60 year old who presents for the following health issues     HPI     Diabetes Follow-up      How often are you checking your blood sugar? Not at all    What concerns do you have today about your diabetes? None     Do you have any of these symptoms? (Select all that apply)  Excessive thirst      BP Readings from Last 2 Encounters:   02/27/20 112/78   07/19/19 117/80     Hemoglobin A1C (%)   Date Value   05/25/2021 9.5 (H)   01/18/2021 8.4 (H)     LDL Cholesterol Calculated (mg/dL)   Date Value   01/18/2021 125 (H)   10/19/2020 106 (H)               Anxiety Follow-Up    How are you doing with your anxiety since your last visit? Improved     Are you having other symptoms that might be associated with anxiety? No    Have you had a significant life event? No     Are you feeling  depressed?     Do you have any concerns with your use of alcohol or other drugs?     Social History     Tobacco Use     Smoking status: Never Smoker     Smokeless tobacco: Never Used   Substance Use Topics     Alcohol use: Yes     Comment: 1/month      Drug use: No     CHAKA-7 SCORE 11/3/2020 12/18/2020 1/21/2021   Total Score - - -   Total Score - - -   Total Score 17 10 5     PHQ 11/3/2020 12/18/2020 1/21/2021   PHQ-9 Total Score 9 3 3   Q9: Thoughts of better off dead/self-harm past 2 weeks Not at all Not at all Not at all           How many servings of fruits and vegetables do you eat daily?  2-3    On average, how many sweetened beverages do you drink each day (Examples: soda, juice, sweet tea, etc.  Do NOT count diet or artificially sweetened beverages)?   0    How many days per week do you exercise enough to make your heart beat faster? 3 or less    How many minutes a day do you exercise enough to make your heart beat faster? 9 or less    How many days per week do you miss taking your medication? 0    See under ROS     Patient Active Problem List   Diagnosis     Acute reaction to stress     Arthropathy     GERD (gastroesophageal reflux disease)     Anxiety     Health Care Home     TBI (traumatic brain injury) (H)     ADD (attention deficit disorder)     Major depressive disorder, single episode in full remission (H)     Malignant neoplasm of right female breast (H)     Hyperlipidemia LDL goal <100     Type 2 diabetes mellitus without complication, without long-term current use of insulin (H)     Status post small bowel resection       Current Outpatient Medications   Medication Sig Dispense Refill     ACE/ARB/ARNI NOT PRESCRIBED, INTENTIONAL, Please choose reason not prescribed, below       atorvastatin (LIPITOR) 10 MG tablet Take 1 tablet (10 mg) by mouth daily 90 tablet 1     exemestane (AROMASIN) 25 MG tablet Take 25 mg by mouth daily       metFORMIN (GLUCOPHAGE) 1000 MG tablet TAKE 1 TABLET TWICE DAILY   "WITH MEALS 180 tablet 0     venlafaxine (EFFEXOR-XR) 75 MG 24 hr capsule TAKE 1 CAPSULE DAILY 90 capsule 0     blood glucose (NO BRAND SPECIFIED) lancets standard Use to test blood sugar 1 times daily or as directed (Patient not taking: Reported on 5/25/2021) 1 each 0     blood glucose (NO BRAND SPECIFIED) test strip Use to test blood sugars 1 times daily or as directed (Patient not taking: Reported on 5/25/2021) 100 strip 1     blood glucose monitoring (ACCU-CHEK SHERYL SMARTVIEW) meter device kit   0     blood glucose monitoring (JOLLY CONTOUR NEXT) test strip Use to test blood sugar 4 times daily or as directed.  Ok to substitute alternative if insurance prefers. (Patient not taking: Reported on 5/25/2021) 200 strip 1     blood glucose monitoring (NO BRAND SPECIFIED) meter device kit Use to test blood sugar 1 times daily or as directed.  Please give needed supplies as well, including lancets (#100), test strips (#100) and control solution. (Patient not taking: Reported on 5/25/2021) 1 kit 0     STATIN NOT PRESCRIBED, INTENTIONAL, Please choose reason not prescribed, below           Review of Systems   CONSTITUTIONAL:NEGATIVE for fever, chills, change in weight  ENT/MOUTH: NEGATIVE for ear, mouth and throat problems  RESP:NEGATIVE for significant cough or SOB  CV: NEGATIVE for chest pain, palpitations or peripheral edema  PSYCHIATRIC: NEGATIVE for changes in mood or affect     Every once in awhile will get a brief fleeting abdominal pain. Might be related to bowel function.     Has not been checking sugars. Has not felt any change. Thirst comes and goes.     Insurance would not cover MT  Did review the list. She does not want an injection. Asking about rybelsus.         Objective    /78 (BP Location: Right arm, Cuff Size: Adult Regular)   Pulse 90   Temp 98.8  F (37.1  C) (Oral)   Resp 16   Ht 1.549 m (5' 1\")   Wt 72.6 kg (160 lb 1.6 oz)   LMP 11/18/2013   SpO2 97%   BMI 30.25 kg/m    Body mass index " is 30.25 kg/m .  Physical Exam   GENERAL APPEARANCE: alert and no distress  RESP: lungs clear to auscultation - no rales, rhonchi or wheezes  CV: regular rates and rhythm  MS: no edema.   PSYCH: mentation appears normal and affect normal/bright    DP pulse present bilaterally.  No sores or ulcerations.  Moderate callous primarily next to first MTPs.  Monofilament exam normal.      PHQ-9 SCORE 12/18/2020 1/21/2021 5/25/2021   PHQ-9 Total Score - - -   PHQ-9 Total Score MyChart - - -   PHQ-9 Total Score 3 3 1       CHAKA-7 SCORE 12/18/2020 1/21/2021 5/25/2021   Total Score - - -   Total Score - - -   Total Score 10 5 3           Lab Results   Component Value Date    A1C 9.5 05/25/2021    A1C 8.4 01/18/2021    A1C 7.6 10/19/2020    A1C 8.4 02/27/2020    A1C 6.9 07/19/2019     Recent Labs   Lab Test 01/18/21  0837 10/19/20  0844   CHOL 212* 186   HDL 30* 33*   * 106*   TRIG 283* 235*

## 2021-05-26 RX ORDER — ATORVASTATIN CALCIUM 10 MG/1
10 TABLET, FILM COATED ORAL DAILY
Qty: 90 TABLET | Refills: 1 | Status: SHIPPED | OUTPATIENT
Start: 2021-05-26 | End: 2021-08-09

## 2021-05-26 ASSESSMENT — ANXIETY QUESTIONNAIRES: GAD7 TOTAL SCORE: 3

## 2021-05-27 ENCOUNTER — TELEPHONE (OUTPATIENT)
Dept: FAMILY MEDICINE | Facility: CLINIC | Age: 61
End: 2021-05-27

## 2021-05-27 NOTE — TELEPHONE ENCOUNTER
MTM referral from: [facility] visit (referral by provider)    MTM referral outreach attempt #2 on May 27, 2021 at 12:49 PM      Outcome: Patient not reachable after several attempts, will route to MTM Pharmacist/Provider as an FYI. Thank you for the referral.    Fazal Palacio, MTM coordinator

## 2021-05-27 NOTE — TELEPHONE ENCOUNTER
Sent Nimco luciag.    Angela Pike, PharmD, [facility]  Medication Therapy Management Provider  Pager: [phone]

## 2021-06-09 ENCOUNTER — OFFICE VISIT (OUTPATIENT)
Dept: PHARMACY | Facility: CLINIC | Age: 61
End: 2021-06-09

## 2021-06-09 DIAGNOSIS — F32.5 MAJOR DEPRESSIVE DISORDER, SINGLE EPISODE IN FULL REMISSION (H): ICD-10-CM

## 2021-06-09 DIAGNOSIS — E11.9 TYPE 2 DIABETES MELLITUS WITHOUT COMPLICATION, WITHOUT LONG-TERM CURRENT USE OF INSULIN (H): Primary | ICD-10-CM

## 2021-06-09 DIAGNOSIS — F41.9 ANXIETY: ICD-10-CM

## 2021-06-09 PROCEDURE — 99605 MTMS BY PHARM NP 15 MIN: CPT | Performed by: PHARMACIST

## 2021-06-09 PROCEDURE — 99607 MTMS BY PHARM ADDL 15 MIN: CPT | Performed by: PHARMACIST

## 2021-06-09 RX ORDER — ORAL SEMAGLUTIDE 3 MG/1
TABLET ORAL
COMMUNITY
End: 2021-06-09

## 2021-06-09 NOTE — Clinical Note
Thanks for the referral!  We really discussed GLP-1's today to include both Ozempic and Trulicity once weekly injections even though she prefers not to do injections I wanted to give her options.  Especially since does have cardiovascular protection and Rybelsus does not get.  She plans to discuss with you before making changes.  We also briefly discussed decreasing her venlafaxine dose to see if it has less side effects but is still effective or possibly switching to an SSRI.  Let me know if you have any questions.    Martha
What Type Of Note Output Would You Prefer (Optional)?: Standard Output
Hpi Title: Evaluation of Skin Lesions
How Severe Are Your Spot(S)?: mild
Have Your Spot(S) Been Treated In The Past?: has not been treated
Additional History: Temp 97.3f

## 2021-06-09 NOTE — PATIENT INSTRUCTIONS
Recommendations from today's MTM visit:                                                    MTM (medication therapy management) is a service provided by a clinical pharmacist designed to help you get the most of out of your medicines.   Today we reviewed what your medicines are for, how to know if they are working, that your medicines are safe and how to make your medicine regimen as easy as possible.      1. Saving card websites  https://www.[employer]/savings-and-support.html  https://www.[employer] Labsresources  https://www.[employer]/savings-and-resources/save-on-ozempic.html    2. Could try lower dose of venlafaxine and see if works as well and less side effects. Then could consider sertraline. Give a good three month trial before switching.    Follow-up: Return in about 1 year (around 6/9/2022) for Medication Therapy Managment Visit - Martha Ying.    It was great to speak with you today.  I value your experience and would be very thankful for your time with providing feedback on our clinic survey. You may receive a survey via email or text message in the next few days.     To schedule another MTM appointment, please call the clinic directly or you may call the MTM scheduling line at [phone] or toll-free at [phone].     My Clinical Pharmacist's contact information:                                                      Please feel free to contact me with any questions or concerns you have.      Martha Ying, PharmD  Medication Therapy Management Pharmacist  [facility] - Monday and Wednesday 7:30 - 4:00  Pager: [phone]

## 2021-06-09 NOTE — PROGRESS NOTES
Medication Therapy Management (MTM) Encounter    ASSESSMENT:                            Medication Adherence/Access: No issues identified    Type 2 Diabetes: Patient is not meeting A1c goal of < 7%. Patient would benefit from GLP-1 agonist.  We discussed Rybelsus, Ozempic and Trulicity today.  I think she would benefit from any of these medications however recommended Ozempic or Trulicity because it has cardiovascular protection.  I showed her the devices for the injectable pens and discussed possible side effects from this medication class.  She will plan to discuss with her PCP before making changes.  She would also benefit from increase in home glucose monitoring and lifestyle changes which we did not discuss in detail today due to time. Aspirin therapy is not indicated in this patient due to age.     Depression/Anxiety: Patient would benefit from trying to decrease venlafaxine to 37.5 mg once daily to see if side effects are less and anxiety and depression are still well controlled.  Could also consider switching to sertraline and titrating up to max dose of 200 mg/day.  Would need to cross taper if the switch was made with gradual dose reduction of venlafaxine, with lowering by increments every few days, usually over a period of 4 weeks.    PLAN:                            1. Saving card websites  https://www.[employer]/savings-and-support.html  https://www.[employer]/savings-resources  https://www.[employer]/savings-and-resources/save-on-ozempic.html    2. Could try lower dose of venlafaxine and see if works as well and less side effects. Then could consider sertraline. Give a good three month trial before switching.    Follow-up: Return in about 1 year (around 6/9/2022) for Medication Therapy Managment Visit - Martha Ying.    SUBJECTIVE/OBJECTIVE:                          Teresa Hahn is a 60 year old female coming in for an initial visit. She was referred to me from Dr. Yocasta Moreno.      Reason for  visit: referred questions regarding other medications she can use for diabetes.    Allergies/ADRs: Reviewed in chart  Tobacco: She reports that she has never smoked. She has never used smokeless tobacco.  Alcohol: Less than 1 beverage / month  Activity: Trying to be active however her knee is giving her issues  Past Medical History: Reviewed in chart - Reports no history of pancreatitis or personal or family history of thyroid cancer.    Medication Adherence/Access: no issues reported    Type 2 Diabetes:  Currently taking metformin 1000 mg twice daily. Patient is not experiencing side effects.   Blood sugar monitoring: Not currently checking.   Symptoms of low blood sugar? none  Symptoms of high blood sugar? none  Eye exam: up to date  Foot exam: up to date  Diet/Exercise: Reports she tries to eat well and be active.  Aspirin: Not taking due to stopped by patient and ASCVD risk score is less than 10%  Statin: Yes: atorvastatin   ACEi/ARB: No.   Urine Albumin:   Lab Results   Component Value Date    UMALCR 14.91 01/18/2021      Lab Results   Component Value Date    A1C 9.5 05/25/2021    A1C 8.4 01/18/2021    A1C 7.6 10/19/2020    A1C 8.4 02/27/2020    A1C 6.9 07/19/2019     Hyperlipidemia: Current therapy includes atorvastatin 10 mg daily.  Patient reports no significant myalgias or other side effects.  The 10-year ASCVD risk score (Tiffaniejulio c RADER Jr., et al., 2013) is: 6.9%    Values used to calculate the score:      Age: 60 years      Sex: Female      Is Non- : No      Diabetic: Yes      Tobacco smoker: No      Systolic Blood Pressure: 126 mmHg      Is BP treated: No      HDL Cholesterol: 31 mg/dL      Total Cholesterol: 143 mg/dL  Recent Labs   Lab Test 05/25/21  1332 01/18/21  0837   CHOL 143 212*   HDL 31* 30*   LDL 58 125*   TRIG 269* 283*     Depression/Anxiety:  Current medications include: Venlafaxine ER 75 mg once daily. Pt reports that depression symptoms are well controlled.  Anxiety is  more of an issue but also well controlled currently.  Reports that she had some nausea with the venlafaxine and wondering if there is another drug that she could try. She has tried escitalopram, citalopram, sertraline and bupropion which were not effective.  Although sertraline is tried quite a while ago and would be willing to retry this in the future.  PHQ-9 SCORE 12/18/2020 1/21/2021 5/25/2021   PHQ-9 Total Score - - -   PHQ-9 Total Score MyChart - - -   PHQ-9 Total Score 3 3 1     CHAKA-7 SCORE 12/18/2020 1/21/2021 5/25/2021   Total Score - - -   Total Score - - -   Total Score 10 5 3     BP Readings from Last 3 Encounters:   05/25/21 126/78   02/27/20 112/78   07/19/19 117/80     Today's Vitals: LMP 11/18/2013   ----------------    I spent 55 minutes with this patient today. A copy of the visit note was provided to the patient's primary care provider.    The patient was given a summary of these recommendations.     Martha Ying, PharmD  Medication Therapy Management Pharmacist            Medication Therapy Recommendations  Anxiety    Current Medication: venlafaxine (EFFEXOR-XR) 75 MG 24 hr capsule   Rationale: Dose too high - Dosage too high - Safety   Recommendation: Decrease Dose - Take 37.5 mg once daily   Status: Declined per Patient         Type 2 diabetes mellitus without complication, without long-term current use of insulin (H)    Current Medication: metFORMIN (GLUCOPHAGE) 1000 MG tablet   Rationale: Synergistic therapy - Needs additional medication therapy - Indication   Recommendation: Start Medication - OZEMPIC (0.25 OR 0.5 MG/DOSE) SC - Inject 0.25 mg once weekly   Status: Declined per Patient

## 2021-06-11 ENCOUNTER — MYC MEDICAL ADVICE (OUTPATIENT)
Dept: FAMILY MEDICINE | Facility: CLINIC | Age: 61
End: 2021-06-11

## 2021-06-11 DIAGNOSIS — E11.9 TYPE 2 DIABETES MELLITUS WITHOUT COMPLICATION, WITHOUT LONG-TERM CURRENT USE OF INSULIN (H): Primary | ICD-10-CM

## 2021-06-11 DIAGNOSIS — F41.9 ANXIETY: ICD-10-CM

## 2021-06-14 RX ORDER — ORAL SEMAGLUTIDE 3 MG/1
3 TABLET ORAL DAILY
Qty: 30 TABLET | Refills: 0 | Status: SHIPPED | OUTPATIENT
Start: 2021-06-14 | End: 2021-11-18

## 2021-06-14 RX ORDER — VENLAFAXINE HYDROCHLORIDE 37.5 MG/1
37.5 CAPSULE, EXTENDED RELEASE ORAL DAILY
Qty: 90 CAPSULE | Refills: 0 | Status: SHIPPED | OUTPATIENT
Start: 2021-06-14 | End: 2021-08-09

## 2021-06-14 NOTE — TELEPHONE ENCOUNTER
Called the pt to discuss.      Semaglutide - she would like that sent to [facility] in [place] as her mail order will not send out a 30 day supply.      She would like the venlafaxine sent to [facility].  Will resend venlafaxine to [facility].    Will forward refill for Semaglutide to Dr. Moreno.

## 2021-06-14 NOTE — TELEPHONE ENCOUNTER
This looks like mail order...   I am wondering if she wants a 30 day to go to a local pharmacy or does she want 30 days to mail order. It looks like one titrates up after that.   This is a new medication for her.     Also, the 37.5 mg venlafaxine is a dose reduction. [facility]   Does she want this at mail order instead?   Does she intend to only take for 30 days and want a smaller supply sent to local pharmacy?    (She was working with Martha on these initially....)        Oral: Note: Administer ?30 minutes before the first food, beverage, or other medications of the day.  Initial: 3 mg once daily for 30 days, then increase to 7 mg once daily; may increase to 14 mg once daily after 30 days on the 7 mg dose if needed to achieve glycemic goals. Note: The lower initial dose (3 mg daily) is intended to reduce GI symptoms; it does not provide effective glycemic control.

## 2021-08-03 ENCOUNTER — TRANSFERRED RECORDS (OUTPATIENT)
Dept: HEALTH INFORMATION MANAGEMENT | Facility: CLINIC | Age: 61
End: 2021-08-03

## 2021-08-04 ENCOUNTER — HOSPITAL ENCOUNTER (OUTPATIENT)
Facility: CLINIC | Age: 61
End: 2021-08-04
Attending: ORTHOPAEDIC SURGERY | Admitting: ORTHOPAEDIC SURGERY
Payer: COMMERCIAL

## 2021-08-06 DIAGNOSIS — E78.5 HYPERLIPIDEMIA LDL GOAL <100: ICD-10-CM

## 2021-08-06 DIAGNOSIS — F41.9 ANXIETY: ICD-10-CM

## 2021-08-09 RX ORDER — VENLAFAXINE HYDROCHLORIDE 75 MG/1
CAPSULE, EXTENDED RELEASE ORAL
Qty: 90 CAPSULE | Refills: 0 | OUTPATIENT
Start: 2021-08-09

## 2021-08-09 RX ORDER — VENLAFAXINE HYDROCHLORIDE 37.5 MG/1
CAPSULE, EXTENDED RELEASE ORAL
Qty: 90 CAPSULE | Refills: 0 | Status: SHIPPED | OUTPATIENT
Start: 2021-08-09 | End: 2021-09-08

## 2021-08-09 RX ORDER — ATORVASTATIN CALCIUM 10 MG/1
TABLET, FILM COATED ORAL
Qty: 90 TABLET | Refills: 1 | Status: SHIPPED | OUTPATIENT
Start: 2021-08-09 | End: 2022-04-18

## 2021-08-09 NOTE — TELEPHONE ENCOUNTER
Prescription approved per [facility] Refill Protocol for atorvastatin and venlafaxine 37.5 mg.    The venlafaxine 75 mg was dc'd on 6/14/21 - denied that to the pharmacy.

## 2021-09-08 ENCOUNTER — MYC MEDICAL ADVICE (OUTPATIENT)
Dept: FAMILY MEDICINE | Facility: CLINIC | Age: 61
End: 2021-09-08

## 2021-09-08 ENCOUNTER — OFFICE VISIT (OUTPATIENT)
Dept: FAMILY MEDICINE | Facility: CLINIC | Age: 61
End: 2021-09-08
Payer: COMMERCIAL

## 2021-09-08 ENCOUNTER — TRANSFERRED RECORDS (OUTPATIENT)
Dept: HEALTH INFORMATION MANAGEMENT | Facility: CLINIC | Age: 61
End: 2021-09-08

## 2021-09-08 VITALS
TEMPERATURE: 98.9 F | HEART RATE: 97 BPM | SYSTOLIC BLOOD PRESSURE: 118 MMHG | OXYGEN SATURATION: 97 % | DIASTOLIC BLOOD PRESSURE: 78 MMHG | RESPIRATION RATE: 16 BRPM | WEIGHT: 160 LBS | BODY MASS INDEX: 30.23 KG/M2

## 2021-09-08 DIAGNOSIS — G89.29 CHRONIC PAIN OF RIGHT ANKLE: ICD-10-CM

## 2021-09-08 DIAGNOSIS — F41.9 ANXIETY: ICD-10-CM

## 2021-09-08 DIAGNOSIS — M25.571 CHRONIC PAIN OF RIGHT ANKLE: ICD-10-CM

## 2021-09-08 DIAGNOSIS — F32.5 MAJOR DEPRESSIVE DISORDER, SINGLE EPISODE IN FULL REMISSION (H): ICD-10-CM

## 2021-09-08 DIAGNOSIS — E78.5 HYPERLIPIDEMIA LDL GOAL <100: ICD-10-CM

## 2021-09-08 DIAGNOSIS — E11.9 TYPE 2 DIABETES MELLITUS WITHOUT COMPLICATION, WITHOUT LONG-TERM CURRENT USE OF INSULIN (H): ICD-10-CM

## 2021-09-08 DIAGNOSIS — E11.9 TYPE 2 DIABETES MELLITUS WITHOUT COMPLICATION, WITHOUT LONG-TERM CURRENT USE OF INSULIN (H): Primary | ICD-10-CM

## 2021-09-08 LAB
HBA1C MFR BLD: 9.9 % (ref 0–5.6)
HOLD SPECIMEN: NORMAL

## 2021-09-08 PROCEDURE — 96127 BRIEF EMOTIONAL/BEHAV ASSMT: CPT | Performed by: FAMILY MEDICINE

## 2021-09-08 PROCEDURE — 83036 HEMOGLOBIN GLYCOSYLATED A1C: CPT | Performed by: FAMILY MEDICINE

## 2021-09-08 PROCEDURE — 99214 OFFICE O/P EST MOD 30 MIN: CPT | Performed by: FAMILY MEDICINE

## 2021-09-08 PROCEDURE — 80048 BASIC METABOLIC PNL TOTAL CA: CPT | Performed by: FAMILY MEDICINE

## 2021-09-08 PROCEDURE — 36415 COLL VENOUS BLD VENIPUNCTURE: CPT | Performed by: FAMILY MEDICINE

## 2021-09-08 RX ORDER — VENLAFAXINE HYDROCHLORIDE 37.5 MG/1
37.5 CAPSULE, EXTENDED RELEASE ORAL DAILY
Qty: 90 CAPSULE | Refills: 0 | Status: SHIPPED | OUTPATIENT
Start: 2021-09-08 | End: 2022-05-26

## 2021-09-08 RX ORDER — FLASH GLUCOSE SCANNING READER
1 EACH MISCELLANEOUS ONCE
Qty: 1 EACH | Refills: 0 | Status: SHIPPED | OUTPATIENT
Start: 2021-09-08 | End: 2021-09-08

## 2021-09-08 RX ORDER — FLASH GLUCOSE SENSOR
1 KIT MISCELLANEOUS
Qty: 2 EACH | Refills: 5 | Status: SHIPPED | OUTPATIENT
Start: 2021-09-08 | End: 2022-09-23

## 2021-09-08 ASSESSMENT — ANXIETY QUESTIONNAIRES
3. WORRYING TOO MUCH ABOUT DIFFERENT THINGS: SEVERAL DAYS
6. BECOMING EASILY ANNOYED OR IRRITABLE: SEVERAL DAYS
7. FEELING AFRAID AS IF SOMETHING AWFUL MIGHT HAPPEN: NOT AT ALL
2. NOT BEING ABLE TO STOP OR CONTROL WORRYING: SEVERAL DAYS
1. FEELING NERVOUS, ANXIOUS, OR ON EDGE: SEVERAL DAYS
IF YOU CHECKED OFF ANY PROBLEMS ON THIS QUESTIONNAIRE, HOW DIFFICULT HAVE THESE PROBLEMS MADE IT FOR YOU TO DO YOUR WORK, TAKE CARE OF THINGS AT HOME, OR GET ALONG WITH OTHER PEOPLE: SOMEWHAT DIFFICULT

## 2021-09-08 ASSESSMENT — PATIENT HEALTH QUESTIONNAIRE - PHQ9
SUM OF ALL RESPONSES TO PHQ QUESTIONS 1-9: 3
5. POOR APPETITE OR OVEREATING: SEVERAL DAYS

## 2021-09-08 ASSESSMENT — PAIN SCALES - GENERAL: PAINLEVEL: MILD PAIN (2)

## 2021-09-08 NOTE — PROGRESS NOTES
Assessment & Plan     Type 2 diabetes mellitus without complication, without long-term current use of insulin (H)  Not in control.  She has not started the additional medication yet. At this time, I am not sure if she is going to. I did discuss concerns about upcoming surgery and potential need to postpone if not getting the sugars under control. Healing is much smoother with this.  Reviewed the Freestyle Nancy. Briefly reviewed the Dexcom online; I have less experience with this. I am not sure if this will be covered, but do see where it may benefit her. Will give it a try.  - Hemoglobin A1c; Future  - Basic metabolic panel  (Ca, Cl, CO2, Creat, Gluc, K, Na, BUN); Future  - Hemoglobin A1c  - Basic metabolic panel  (Ca, Cl, CO2, Creat, Gluc, K, Na, BUN)  - Extra Tube; Future  - Extra Tube  - Continuous Blood Gluc  (FREESTYLE NANCY 14 DAY READER) YRIS; 1 each once for 1 dose Use to read blood sugars as per 's instructions.  - Continuous Blood Gluc Sensor (FREESTYLE NANCY 14 DAY SENSOR) MISC; 1 each every 14 days Change every 14 days.  - metFORMIN (GLUCOPHAGE) 1000 MG tablet; TAKE 1 TABLET TWICE DAILY  WITH MEALS    Anxiety  She notes she is doing well on this dose. Scores are stable.   - venlafaxine (EFFEXOR-XR) 37.5 MG 24 hr capsule; Take 1 capsule (37.5 mg) by mouth daily    Major depressive disorder, single episode in full remission (H)  Scores stable.     Hyperlipidemia LDL goal <100  Stable on statin.    Chronic pain of right ankle  Anticipating surgery. See above; discussed importance of getting glucose under control.                     Return in about 2 months (around 11/8/2021) for pre op physical.    Yocasta Moreno MD, MD  [facility] TERRELL Moore is a 60 year old who presents for the following health issues     HPI     Diabetes Follow-up      How often are you checking your blood sugar? Not at all    What concerns do you have today about your diabetes?  Other: would like to have a continuous glucose monitor      Do you have any of these symptoms? (Select all that apply)  No numbness or tingling in feet.  No redness, sores or blisters on feet.  No complaints of excessive thirst.  No reports of blurry vision.  No significant changes to weight.      BP Readings from Last 2 Encounters:   05/25/21 126/78   02/27/20 112/78     Hemoglobin A1C (%)   Date Value   05/25/2021 9.5 (H)   01/18/2021 8.4 (H)     LDL Cholesterol Calculated (mg/dL)   Date Value   05/25/2021 58   01/18/2021 125 (H)               Hyperlipidemia Follow-Up      Are you regularly taking any medication or supplement to lower your cholesterol?   Yes- .    Are you having muscle aches or other side effects that you think could be caused by your cholesterol lowering medication?  No      How many servings of fruits and vegetables do you eat daily?  2-3    On average, how many sweetened beverages do you drink each day (Examples: soda, juice, sweet tea, etc.  Do NOT count diet or artificially sweetened beverages)?   0    How many days per week do you exercise enough to make your heart beat faster? 3 or less    How many minutes a day do you exercise enough to make your heart beat faster? 9 or less    How many days per week do you miss taking your medication? 0    See under ROS     Patient Active Problem List   Diagnosis     Acute reaction to stress     Arthropathy     GERD (gastroesophageal reflux disease)     Anxiety     Health Care Home     TBI (traumatic brain injury) (H)     ADD (attention deficit disorder)     Major depressive disorder, single episode in full remission (H)     Malignant neoplasm of right female breast (H)     Hyperlipidemia LDL goal <100     Type 2 diabetes mellitus without complication, without long-term current use of insulin (H)     Status post small bowel resection       Current Outpatient Medications   Medication Sig Dispense Refill     atorvastatin (LIPITOR) 10 MG tablet TAKE 1 TABLET  DAILY 90 tablet 1     exemestane (AROMASIN) 25 MG tablet Take 25 mg by mouth daily       metFORMIN (GLUCOPHAGE) 1000 MG tablet TAKE 1 TABLET TWICE DAILY  WITH MEALS 180 tablet 0     venlafaxine (EFFEXOR-XR) 37.5 MG 24 hr capsule TAKE 1 CAPSULE DAILY 90 capsule 0     ACE/ARB/ARNI NOT PRESCRIBED, INTENTIONAL, Please choose reason not prescribed, below       ASPIRIN NOT PRESCRIBED (INTENTIONAL) Antiplatelet medication not prescribed intentionally due to Not indicated based on age and ASCVD risk score less than 10%       blood glucose (NO BRAND SPECIFIED) lancets standard Use to test blood sugar 1 times daily or as directed (Patient not taking: Reported on 5/25/2021) 1 each 0     blood glucose (NO BRAND SPECIFIED) test strip Use to test blood sugars 1 times daily or as directed (Patient not taking: Reported on 5/25/2021) 100 strip 1     blood glucose monitoring (NO BRAND SPECIFIED) meter device kit Use to test blood sugar 1 times daily or as directed.  Please give needed supplies as well, including lancets (#100), test strips (#100) and control solution. (Patient not taking: Reported on 5/25/2021) 1 kit 0     Semaglutide (RYBELSUS) 3 MG TABS Take 3 mg by mouth daily (Patient not taking: Reported on 9/8/2021) 30 tablet 0         Review of Systems   CONSTITUTIONAL:NEGATIVE for fever, chills, change in weight  ENT/MOUTH: NEGATIVE for ear, mouth and throat problems  RESP:NEGATIVE for significant cough or SOB  CV: NEGATIVE for chest pain, palpitations or peripheral edema  PSYCHIATRIC: NEGATIVE for changes in mood or affect    Anticipates ankle surgery and the need for a pre op PE. Surgery is scheduled for December.    She has picked up rybelsus, but has not started it yet.  She has been nervous about adding anything more into her body. Also considering if the injection might be easier.    Wondering about a CGM; would like to see effects of exercise and diet.          Objective    /78 (BP Location: Right arm, Patient  Position: Sitting, Cuff Size: Adult Large)   Pulse 97   Temp 98.9  F (37.2  C) (Oral)   Resp 16   Wt 72.6 kg (160 lb)   LMP 11/18/2013   SpO2 97%   BMI 30.23 kg/m    Body mass index is 30.23 kg/m .  Physical Exam   GENERAL APPEARANCE: healthy, alert and no distress  RESP: lungs clear to auscultation - no rales, rhonchi or wheezes  CV: regular rates and rhythm  MS: no edema.   PSYCH: mentation appears normal and affect normal/bright    PHQ-9 SCORE 1/21/2021 5/25/2021 9/8/2021   PHQ-9 Total Score - - -   PHQ-9 Total Score MyChart - - -   PHQ-9 Total Score 3 1 3       CHAKA-7 SCORE 12/18/2020 1/21/2021 5/25/2021   Total Score - - -   Total Score - - -   Total Score 10 5 3           Recent Labs   Lab Test 05/25/21  1332 01/18/21  0837   CHOL 143 212*   HDL 31* 30*   LDL 58 125*   TRIG 269* 283*       Lab Results   Component Value Date    A1C 9.9 09/08/2021    A1C 9.5 05/25/2021    A1C 8.4 01/18/2021    A1C 7.6 10/19/2020    A1C 8.4 02/27/2020    A1C 6.9 07/19/2019

## 2021-09-09 LAB
ANION GAP SERPL CALCULATED.3IONS-SCNC: 8 MMOL/L (ref 3–14)
BUN SERPL-MCNC: 12 MG/DL (ref 7–30)
CALCIUM SERPL-MCNC: 9.2 MG/DL (ref 8.5–10.1)
CHLORIDE BLD-SCNC: 104 MMOL/L (ref 94–109)
CO2 SERPL-SCNC: 24 MMOL/L (ref 20–32)
CREAT SERPL-MCNC: 0.61 MG/DL (ref 0.52–1.04)
GFR SERPL CREATININE-BSD FRML MDRD: >90 ML/MIN/1.73M2
GLUCOSE BLD-MCNC: 303 MG/DL (ref 70–99)
POTASSIUM BLD-SCNC: 4.3 MMOL/L (ref 3.4–5.3)
SODIUM SERPL-SCNC: 136 MMOL/L (ref 133–144)

## 2021-09-10 ENCOUNTER — MYC MEDICAL ADVICE (OUTPATIENT)
Dept: FAMILY MEDICINE | Facility: CLINIC | Age: 61
End: 2021-09-10

## 2021-09-10 DIAGNOSIS — E11.9 TYPE 2 DIABETES MELLITUS WITHOUT COMPLICATION, WITHOUT LONG-TERM CURRENT USE OF INSULIN (H): Primary | ICD-10-CM

## 2021-09-10 RX ORDER — ORAL SEMAGLUTIDE 3 MG/1
3 TABLET ORAL DAILY
Qty: 90 TABLET | Refills: 1 | Status: CANCELLED | OUTPATIENT
Start: 2021-09-10

## 2021-09-10 NOTE — TELEPHONE ENCOUNTER
Patient now wanting to start the Rybelsus and would like 90 day supply rx to be sent to [facility] Mail order pharmacy.    Call with any questions.  [phone]

## 2021-09-11 RX ORDER — ORAL SEMAGLUTIDE 7 MG/1
1 TABLET ORAL DAILY
Qty: 90 TABLET | Refills: 0 | Status: SHIPPED | OUTPATIENT
Start: 2021-09-11 | End: 2021-11-22

## 2021-09-13 ENCOUNTER — TELEPHONE (OUTPATIENT)
Dept: PHARMACY | Facility: CLINIC | Age: 61
End: 2021-09-13

## 2021-09-13 DIAGNOSIS — E11.9 TYPE 2 DIABETES MELLITUS WITHOUT COMPLICATION, WITHOUT LONG-TERM CURRENT USE OF INSULIN (H): Primary | ICD-10-CM

## 2021-09-13 RX ORDER — BLOOD-GLUCOSE METER
1 EACH MISCELLANEOUS ONCE
Qty: 1 KIT | Refills: 0 | Status: SHIPPED | OUTPATIENT
Start: 2021-09-13 | End: 2021-09-13

## 2021-09-13 RX ORDER — LANCETS
EACH MISCELLANEOUS
Qty: 100 EACH | Refills: 11 | Status: SHIPPED | OUTPATIENT
Start: 2021-09-13 | End: 2022-12-01

## 2021-09-13 RX ORDER — BLOOD SUGAR DIAGNOSTIC
STRIP MISCELLANEOUS
Qty: 50 STRIP | Refills: 11 | Status: SHIPPED | OUTPATIENT
Start: 2021-09-13 | End: 2022-12-01

## 2021-09-13 NOTE — TELEPHONE ENCOUNTER
Patient called to ask about how to take the Rybelsus since she just started it and it had been a while since our visit when we discussed.  We discussed how to take Rybelsus and what side effects to expect. She was also wondering if I could send in a new glucose meter so she could start checking her blood sugar.  I sent the prescriptions for Accu-Chek meter and supplies to her pharmacy using collaborative practice agreement with Dr. Yocasta Moreno.    Recommended she could follow-up with me on her diabetes or see what her insurance covers for diabetes education since MTM visits are not covered.    Martha Ying, PharmD  Medication Therapy Management Pharmacist

## 2021-10-23 ENCOUNTER — HEALTH MAINTENANCE LETTER (OUTPATIENT)
Age: 61
End: 2021-10-23

## 2021-11-03 DIAGNOSIS — Z11.59 ENCOUNTER FOR SCREENING FOR OTHER VIRAL DISEASES: ICD-10-CM

## 2021-11-18 ENCOUNTER — OFFICE VISIT (OUTPATIENT)
Dept: FAMILY MEDICINE | Facility: CLINIC | Age: 61
End: 2021-11-18
Payer: COMMERCIAL

## 2021-11-18 VITALS
OXYGEN SATURATION: 97 % | RESPIRATION RATE: 16 BRPM | SYSTOLIC BLOOD PRESSURE: 94 MMHG | HEIGHT: 61 IN | WEIGHT: 155.3 LBS | BODY MASS INDEX: 29.32 KG/M2 | TEMPERATURE: 98.5 F | HEART RATE: 69 BPM | DIASTOLIC BLOOD PRESSURE: 60 MMHG

## 2021-11-18 DIAGNOSIS — Z23 NEED FOR PROPHYLACTIC VACCINATION AND INOCULATION AGAINST INFLUENZA: ICD-10-CM

## 2021-11-18 DIAGNOSIS — Z13.29 SCREENING FOR THYROID DISORDER: ICD-10-CM

## 2021-11-18 DIAGNOSIS — R40.0 DAYTIME SLEEPINESS: ICD-10-CM

## 2021-11-18 DIAGNOSIS — M25.9 ANKLE DISORDER: ICD-10-CM

## 2021-11-18 DIAGNOSIS — Z01.818 PREOP GENERAL PHYSICAL EXAM: ICD-10-CM

## 2021-11-18 DIAGNOSIS — E78.5 HYPERLIPIDEMIA LDL GOAL <100: ICD-10-CM

## 2021-11-18 DIAGNOSIS — E11.9 TYPE 2 DIABETES MELLITUS WITHOUT COMPLICATION, WITHOUT LONG-TERM CURRENT USE OF INSULIN (H): Primary | ICD-10-CM

## 2021-11-18 LAB
ERYTHROCYTE [DISTWIDTH] IN BLOOD BY AUTOMATED COUNT: 13.3 % (ref 10–15)
HBA1C MFR BLD: 8.2 % (ref 0–5.6)
HCT VFR BLD AUTO: 46 % (ref 35–47)
HGB BLD-MCNC: 15.3 G/DL (ref 11.7–15.7)
MCH RBC QN AUTO: 28.9 PG (ref 26.5–33)
MCHC RBC AUTO-ENTMCNC: 33.3 G/DL (ref 31.5–36.5)
MCV RBC AUTO: 87 FL (ref 78–100)
PLATELET # BLD AUTO: 203 10E3/UL (ref 150–450)
RBC # BLD AUTO: 5.3 10E6/UL (ref 3.8–5.2)
WBC # BLD AUTO: 8.7 10E3/UL (ref 4–11)

## 2021-11-18 PROCEDURE — 83036 HEMOGLOBIN GLYCOSYLATED A1C: CPT | Performed by: NURSE PRACTITIONER

## 2021-11-18 PROCEDURE — 99214 OFFICE O/P EST MOD 30 MIN: CPT | Mod: 25 | Performed by: NURSE PRACTITIONER

## 2021-11-18 PROCEDURE — 90682 RIV4 VACC RECOMBINANT DNA IM: CPT | Performed by: NURSE PRACTITIONER

## 2021-11-18 PROCEDURE — 85027 COMPLETE CBC AUTOMATED: CPT | Performed by: NURSE PRACTITIONER

## 2021-11-18 PROCEDURE — 84443 ASSAY THYROID STIM HORMONE: CPT | Performed by: NURSE PRACTITIONER

## 2021-11-18 PROCEDURE — 90471 IMMUNIZATION ADMIN: CPT | Performed by: NURSE PRACTITIONER

## 2021-11-18 PROCEDURE — 36415 COLL VENOUS BLD VENIPUNCTURE: CPT | Performed by: NURSE PRACTITIONER

## 2021-11-18 PROCEDURE — 80053 COMPREHEN METABOLIC PANEL: CPT | Performed by: NURSE PRACTITIONER

## 2021-11-18 PROCEDURE — 82043 UR ALBUMIN QUANTITATIVE: CPT | Performed by: NURSE PRACTITIONER

## 2021-11-18 PROCEDURE — 80061 LIPID PANEL: CPT | Performed by: NURSE PRACTITIONER

## 2021-11-18 RX ORDER — ASPIRIN 81 MG/1
81 TABLET ORAL DAILY
COMMUNITY
End: 2021-11-23

## 2021-11-18 ASSESSMENT — PAIN SCALES - GENERAL: PAINLEVEL: NO PAIN (0)

## 2021-11-18 ASSESSMENT — MIFFLIN-ST. JEOR: SCORE: 1206.82

## 2021-11-18 NOTE — PATIENT INSTRUCTIONS

## 2021-11-18 NOTE — PROGRESS NOTES
[facility]  [address] [facility] [address]  Phone: [phone]  Primary Provider: Yocasta Moreno  Pre-op Performing Provider: VIN PAGAN RA      PREOPERATIVE EVALUATION:  Today's date: 11/18/2021    Teresa Hahn is a 61 year old female who presents for a preoperative evaluation.    Surgical Information:  Surgery/Procedure: RIGHT CONVERSION ANKLE FUSION TOTAL ANKLE REPLACEMENT  Surgery Location: [facility]  Surgeon: Dr. Shahab Bagley  Surgery Date: 12/6/2021  Time of Surgery: 7:20am  Where patient plans to recover: At home with family  Fax number for surgical facility: Note does not need to be faxed, will be available electronically in Epic.    Type of Anesthesia Anticipated: General    Assessment & Plan     The proposed surgical procedure is considered INTERMEDIATE risk.    Preop general physical exam  Labs today.  Blood sugar has improved.    - CBC with platelets; Future  - Comprehensive metabolic panel (BMP + Alb, Alk Phos, ALT, AST, Total. Bili, TP); Future  - CBC with platelets  - Comprehensive metabolic panel (BMP + Alb, Alk Phos, ALT, AST, Total. Bili, TP)    Ankle disorder  Replacement planned.    Type 2 diabetes mellitus without complication, without long-term current use of insulin (H)  Improved sugars.  Continue with current regimen.  Return to clinic in 1 month for a1c as today's check was early.  Return to clinic for visit in 3 months.  - Hemoglobin A1c; Future  - Comprehensive metabolic panel (BMP + Alb, Alk Phos, ALT, AST, Total. Bili, TP); Future  - Albumin Random Urine Quantitative with Creat Ratio; Future  - TSH with free T4 reflex; Future  - Hemoglobin A1c  - Comprehensive metabolic panel (BMP + Alb, Alk Phos, ALT, AST, Total. Bili, TP)  - Albumin Random Urine Quantitative with Creat Ratio  - TSH with free T4 reflex  - TSH with free T4 reflex; Future  - Lipid panel reflex to direct LDL Fasting; Future  - Hemoglobin A1c;  Future  - Lipid panel reflex to direct LDL Fasting    Hyperlipidemia LDL goal <100  - Comprehensive metabolic panel (BMP + Alb, Alk Phos, ALT, AST, Total. Bili, TP); Future  - TSH with free T4 reflex; Future  - Comprehensive metabolic panel (BMP + Alb, Alk Phos, ALT, AST, Total. Bili, TP)  - TSH with free T4 reflex    Screening for thyroid disorder  Interested in screening due to increased risk with medication of thyroid cancer.  - US Thyroid; Future    Daytime sleepiness  Previous consult but never had a sleep study.  - SLEEP EVALUATION & MANAGEMENT REFERRAL - ADULT -; Future    Possible Sleep Apnea:               Medication Instructions:  Patient will hold metformin and rybelsus on the day of surgery.    RECOMMENDATION:  APPROVAL GIVEN to proceed with proposed procedure, without further diagnostic evaluation.      Reviewed previous notes for 5 minutes        Subjective     HPI related to upcoming procedure: previous ankle surgery.  Decreased ROM.  Replacement planned.      Preop Questions 11/18/2021   1. Have you ever had a heart attack or stroke? No   2. Have you ever had surgery on your heart or blood vessels, such as a stent placement, a coronary artery bypass, or surgery on an artery in your head, neck, heart, or legs? No   3. Do you have chest pain with activity? No   4. Do you have a history of  heart failure? No   5. Do you currently have a cold, bronchitis or symptoms of other infection? No   6. Do you have a cough, shortness of breath, or wheezing? No   7. Do you or anyone in your family have previous history of blood clots? No   8. Do you or does anyone in your family have a serious bleeding problem such as prolonged bleeding following surgeries or cuts? No/unknown   9. Have you ever had problems with anemia or been told to take iron pills? UNKNOWN -    10. Have you had any abnormal blood loss such as black, tarry or bloody stools, or abnormal vaginal bleeding? No   11. Have you ever had a blood  transfusion? YES -    11a. Have you ever had a transfusion reaction? No   12. Are you willing to have a blood transfusion if it is medically needed before, during, or after your surgery? Yes   13. Have you or any of your relatives ever had problems with anesthesia? YES - GI upset, hives   14. Do you have sleep apnea, excessive snoring or daytime drowsiness? Possible sleep apnea.  No sleep study in the past.   14a. Do you have a CPAP machine? No   15. Do you have any artifical heart valves or other implanted medical devices like a pacemaker, defibrillator, or continuous glucose monitor? No   16. Do you have artificial joints? No   17. Are you allergic to latex? No   18. Is there any chance that you may be pregnant? -     Health Care Directive:  Patient does not have a Health Care Directive or Living Will:     Preoperative Review of :   reviewed - no record of controlled substances prescribed.      Status of Chronic Conditions:  See problem list for active medical problems.  Problems all longstanding and stable, except as noted/documented.  See ROS for pertinent symptoms related to these conditions.      Review of Systems  Constitutional, neuro, ENT, endocrine, pulmonary, cardiac, gastrointestinal, genitourinary, musculoskeletal, integument and psychiatric systems are negative, except as otherwise noted.    Patient Active Problem List    Diagnosis Date Noted     Status post small bowel resection 07/19/2019     Priority: Medium     Type 2 diabetes mellitus without complication, without long-term current use of insulin (H) 07/19/2018     Priority: Medium     Hyperlipidemia LDL goal <100 07/11/2017     Priority: Medium     Malignant neoplasm of right female breast (H) 02/22/2017     Priority: Medium     Major depressive disorder, single episode in full remission (H) 04/09/2014     Priority: Medium     ADD (attention deficit disorder) 04/12/2013     Priority: Medium     TBI (traumatic brain injury) (H) 12/28/2012      Priority: Medium     age 2. coma x 3 weeks.       Health Care Home 11/10/2010     Priority: Medium     Care Coordination Post ED Assessment    Utilization:  No utilization concerns.  Has f/u with primary clinic tomorrow.    Disease State:  No noticeable improvement in the palsy since beginning treatment.  She had many questions about Bell's Palsy, and education was provided.  Patient stated understanding.        Medications:  Educated patient on indications for treatment with Valtrex, and Prednisone, and reviewed potential side effects.  Patient stated understanding.    Functionality:No concerns.    Psychosocial:Patient is in good spirits.      Plan:  No further follow up needed at this time.   Bambi Bill RN, PHN,[facility]            DX V65.8 REPLACED WITH [facility] [employer] [facility] (2013)       Anxiety 2010     Priority: Medium     GERD (gastroesophageal reflux disease) 2008     Priority: Medium     Arthropathy      Priority: Medium     from right ankle fracture.  Problem list name updated by automated process. Provider to review       Acute reaction to stress 2006     Priority: Medium     Problem list name updated by automated process. Provider to review        Past Medical History:   Diagnosis Date     arthritis     from right ankle fracture.     Diabetes (H)      PONV (postoperative nausea and vomiting)      TBI (traumatic brain injury) (H) 2012    age 2. coma x 3 weeks.      Past Surgical History:   Procedure Laterality Date     ABDOMEN SURGERY       x 4      ENT SURGERY      Tonsillectomy     MAMMOPLASTY REDUCTION Left 2017    Procedure: MAMMOPLASTY REDUCTION;  LEFT BREAST REDUCTION MAMMOPLASTY ; RIGHT BREAST REVISION TRAM FLAP ;  Surgeon: Charan Lane MD;  Location: [facility]     MASTECTOMY, RECONSTRUCT BREAST, COMBINED Right 2017     REVISION TRAM FLAP Right 2017    Procedure: REVISION TRAM FLAP;;  Surgeon: Charan Lane MD;  Location: [facility]      SURGICAL HISTORY OF -   12/2004    r leg broken in several areas; now arthritis. 3 surgeries     SURGICAL HISTORY OF -   2008    right ankle fusion     SURGICAL HISTORY OF -  Left 12/30/2018    incisional hernia repair and small bowel resection (incarcerated)     Current Outpatient Medications   Medication Sig Dispense Refill     ACE/ARB/ARNI NOT PRESCRIBED, INTENTIONAL, Please choose reason not prescribed, below       atorvastatin (LIPITOR) 10 MG tablet TAKE 1 TABLET DAILY 90 tablet 1     blood glucose (ACCU-CHEK GUIDE) test strip Use to test blood sugar 1 time daily or as directed. 50 strip 11     blood glucose (NO BRAND SPECIFIED) lancets standard Use to test blood sugar 1 times daily or as directed 1 each 0     blood glucose (NO BRAND SPECIFIED) test strip Use to test blood sugars 1 times daily or as directed 100 strip 1     blood glucose monitoring (NO BRAND SPECIFIED) meter device kit Use to test blood sugar 1 times daily or as directed.  Please give needed supplies as well, including lancets (#100), test strips (#100) and control solution. 1 kit 0     blood glucose monitoring (SOFTCLIX) lancets Use to test blood sugar 1 time daily or as directed. 100 each 11     Continuous Blood Gluc Sensor (FREESTYLE ROBBY 14 DAY SENSOR) [facility] 1 each every 14 days Change every 14 days. 2 each 5     exemestane (AROMASIN) 25 MG tablet Take 25 mg by mouth daily       metFORMIN (GLUCOPHAGE) 1000 MG tablet TAKE 1 TABLET TWICE DAILY  WITH MEALS 180 tablet 0     Semaglutide (RYBELSUS) 7 MG TABS Take 1 tablet by mouth daily 90 tablet 0     venlafaxine (EFFEXOR-XR) 37.5 MG 24 hr capsule Take 1 capsule (37.5 mg) by mouth daily 90 capsule 0     ASPIRIN NOT PRESCRIBED (INTENTIONAL) Antiplatelet medication not prescribed intentionally due to Not indicated based on age and ASCVD risk score less than 10% (Patient not taking: Reported on 11/18/2021)       Semaglutide (RYBELSUS) 3 MG TABS Take 3 mg by mouth daily (Patient not taking: Reported  "on 11/18/2021) 30 tablet 0       Allergies   Allergen Reactions     Nickel Rash        Social History     Tobacco Use     Smoking status: Never Smoker     Smokeless tobacco: Never Used   Substance Use Topics     Alcohol use: Yes     Comment: 1/month      Family History   Problem Relation Age of Onset     C.A.D. Mother         numerous stents; over age 65.     Cancer Mother         multiple myeloma     Heart Disease Maternal Aunt         valve problem     Respiratory Maternal Aunt         COPD     Breast Cancer Other         paternal cousin     Other Cancer Brother 46        Lymphoma     Coronary Artery Disease Brother 47        MI; has a pacemaker     Arrhythmia Brother      Arrhythmia Son         mild; might not trully be abnormal. had evaluation.     Coronary Artery Disease Brother         ? rhythm     Arrhythmia Daughter         early postpartum     History   Drug Use No         Objective     BP 94/60 (BP Location: Right arm, Patient Position: Sitting, Cuff Size: Adult Large)   Pulse 69   Temp 98.5  F (36.9  C) (Oral)   Resp 16   Ht 1.549 m (5' 1\")   Wt 70.4 kg (155 lb 4.8 oz)   LMP 11/18/2013   SpO2 97%   BMI 29.34 kg/m      Physical Exam    GENERAL APPEARANCE: healthy, alert and no distress     EYES: Eyes grossly normal to inspection     HENT: ear canals and TM's normal and nose and mouth without ulcers or lesions     NECK: no adenopathy, no asymmetry, masses, or scars and thyroid normal to palpation     RESP: lungs clear to auscultation - no rales, rhonchi or wheezes     CV: regular rates and rhythm, normal S1 S2, no S3 or S4 and no murmur, click or rub     ABDOMEN:  soft, nontender, no HSM or masses and bowel sounds normal     NEURO: Normal strength and tone, sensory exam grossly normal, mentation intact and speech normal     PSYCH: mentation appears normal. and affect normal/bright     LYMPHATICS: No cervical adenopathy    Recent Labs   Lab Test 11/18/21  1109 09/08/21  1414 01/18/21  0837 " 10/19/20  0844   HGB 15.3  --   --   --      --   --   --    NA  --  136  --  137   POTASSIUM  --  4.3  --  4.4   CR  --  0.61  --  0.68   A1C 8.2* 9.9*   < > 7.6*    < > = values in this interval not displayed.      Lab Results   Component Value Date    A1C 8.2 11/18/2021    A1C 9.9 09/08/2021    A1C 9.5 05/25/2021    A1C 8.4 01/18/2021    A1C 7.6 10/19/2020    A1C 8.4 02/27/2020    A1C 6.9 07/19/2019     Last Comprehensive Metabolic Panel:  Sodium   Date Value Ref Range Status   11/18/2021 137 133 - 144 mmol/L Final   10/19/2020 137 133 - 144 mmol/L Final     Potassium   Date Value Ref Range Status   11/18/2021 4.1 3.4 - 5.3 mmol/L Final   10/19/2020 4.4 3.4 - 5.3 mmol/L Final     Chloride   Date Value Ref Range Status   11/18/2021 103 94 - 109 mmol/L Final   10/19/2020 105 94 - 109 mmol/L Final     Carbon Dioxide   Date Value Ref Range Status   10/19/2020 26 20 - 32 mmol/L Final     Carbon Dioxide (CO2)   Date Value Ref Range Status   11/18/2021 27 20 - 32 mmol/L Final     Anion Gap   Date Value Ref Range Status   11/18/2021 7 3 - 14 mmol/L Final   10/19/2020 6 3 - 14 mmol/L Final     Glucose   Date Value Ref Range Status   11/18/2021 188 (H) 70 - 99 mg/dL Final   10/19/2020 159 (H) 70 - 99 mg/dL Final     Urea Nitrogen   Date Value Ref Range Status   11/18/2021 8 7 - 30 mg/dL Final   10/19/2020 10 7 - 30 mg/dL Final     Creatinine   Date Value Ref Range Status   11/18/2021 0.65 0.52 - 1.04 mg/dL Final   10/19/2020 0.68 0.52 - 1.04 mg/dL Final     GFR Estimate   Date Value Ref Range Status   11/18/2021 >90 >60 mL/min/1.73m2 Final     Comment:     As of July 11, 2021, eGFR is calculated by the CKD-EPI creatinine equation, without race adjustment. eGFR can be influenced by muscle mass, exercise, and diet. The reported eGFR is an estimation only and is only applicable if the renal function is stable.   10/19/2020 >90 >60 mL/min/[1.73_m2] Final     Comment:     Non  GFR Calc  Starting  12/18/2018, serum creatinine based estimated GFR (eGFR) will be   calculated using the Chronic Kidney Disease Epidemiology Collaboration   (CKD-EPI) equation.       Calcium   Date Value Ref Range Status   11/18/2021 9.4 8.5 - 10.1 mg/dL Final   10/19/2020 9.9 8.5 - 10.1 mg/dL Final     Bilirubin Total   Date Value Ref Range Status   11/18/2021 0.6 0.2 - 1.3 mg/dL Final   10/19/2020 0.5 0.2 - 1.3 mg/dL Final     Alkaline Phosphatase   Date Value Ref Range Status   11/18/2021 103 40 - 150 U/L Final   10/19/2020 112 40 - 150 U/L Final     ALT   Date Value Ref Range Status   11/18/2021 70 (H) 0 - 50 U/L Final   05/25/2021 67 (H) 0 - 50 U/L Final     AST   Date Value Ref Range Status   11/18/2021 33 0 - 45 U/L Final   10/19/2020 27 0 - 45 U/L Final             Lab Results   Component Value Date    WBC 8.7 11/18/2021    WBC 8.5 03/07/2017     Lab Results   Component Value Date    RBC 5.30 11/18/2021    RBC 5.20 03/07/2017     Lab Results   Component Value Date    HGB 15.3 11/18/2021    HGB 14.1 12/18/2017     Lab Results   Component Value Date    HCT 46.0 11/18/2021    HCT 44.3 03/07/2017     No components found for: MCT  Lab Results   Component Value Date    MCV 87 11/18/2021    MCV 85 03/07/2017     Lab Results   Component Value Date    MCH 28.9 11/18/2021    MCH 28.8 03/07/2017     Lab Results   Component Value Date    MCHC 33.3 11/18/2021    MCHC 33.9 03/07/2017     Lab Results   Component Value Date    RDW 13.3 11/18/2021    RDW 12.8 03/07/2017     Lab Results   Component Value Date     11/18/2021     03/07/2017         Diagnostics:  Labs pending at this time.  Results will be reviewed when available.       Revised Cardiac Risk Index (RCRI):  The patient has the following serious cardiovascular risks for perioperative complications:   - No serious cardiac risks = 0 points     RCRI Interpretation: 0 points: Class I (very low risk - 0.4% complication rate)           Signed Electronically by: Celeste Juarez  CARL Valenzuela CNP  Copy of this evaluation report is provided to requesting physician.

## 2021-11-19 LAB
ALBUMIN SERPL-MCNC: 4.1 G/DL (ref 3.4–5)
ALP SERPL-CCNC: 103 U/L (ref 40–150)
ALT SERPL W P-5'-P-CCNC: 70 U/L (ref 0–50)
ANION GAP SERPL CALCULATED.3IONS-SCNC: 7 MMOL/L (ref 3–14)
AST SERPL W P-5'-P-CCNC: 33 U/L (ref 0–45)
BILIRUB SERPL-MCNC: 0.6 MG/DL (ref 0.2–1.3)
BUN SERPL-MCNC: 8 MG/DL (ref 7–30)
CALCIUM SERPL-MCNC: 9.4 MG/DL (ref 8.5–10.1)
CHLORIDE BLD-SCNC: 103 MMOL/L (ref 94–109)
CHOLEST SERPL-MCNC: 137 MG/DL
CO2 SERPL-SCNC: 27 MMOL/L (ref 20–32)
CREAT SERPL-MCNC: 0.65 MG/DL (ref 0.52–1.04)
CREAT UR-MCNC: 185 MG/DL
GFR SERPL CREATININE-BSD FRML MDRD: >90 ML/MIN/1.73M2
GLUCOSE BLD-MCNC: 188 MG/DL (ref 70–99)
HDLC SERPL-MCNC: 30 MG/DL
LDLC SERPL CALC-MCNC: 67 MG/DL
MICROALBUMIN UR-MCNC: 18 MG/L
MICROALBUMIN/CREAT UR: 9.73 MG/G CR (ref 0–25)
NONHDLC SERPL-MCNC: 107 MG/DL
POTASSIUM BLD-SCNC: 4.1 MMOL/L (ref 3.4–5.3)
PROT SERPL-MCNC: 7.1 G/DL (ref 6.8–8.8)
SODIUM SERPL-SCNC: 137 MMOL/L (ref 133–144)
TRIGL SERPL-MCNC: 201 MG/DL
TSH SERPL DL<=0.005 MIU/L-ACNC: 1.07 MU/L (ref 0.4–4)

## 2021-11-25 ENCOUNTER — TELEPHONE (OUTPATIENT)
Dept: FAMILY MEDICINE | Facility: CLINIC | Age: 61
End: 2021-11-25
Payer: COMMERCIAL

## 2021-12-13 ENCOUNTER — TELEPHONE (OUTPATIENT)
Dept: FAMILY MEDICINE | Facility: CLINIC | Age: 61
End: 2021-12-13
Payer: COMMERCIAL

## 2021-12-13 DIAGNOSIS — E11.9 TYPE 2 DIABETES MELLITUS WITHOUT COMPLICATION, WITHOUT LONG-TERM CURRENT USE OF INSULIN (H): ICD-10-CM

## 2021-12-13 RX ORDER — ORAL SEMAGLUTIDE 7 MG/1
1 TABLET ORAL DAILY
Qty: 90 TABLET | Refills: 0 | Status: SHIPPED | OUTPATIENT
Start: 2021-12-13 | End: 2022-03-11

## 2021-12-13 NOTE — TELEPHONE ENCOUNTER
Pending Prescriptions:                       Disp   Refills    Semaglutide (RYBELSUS) 7 MG TABS          90 tab*0            Sig: Take 1 tablet by mouth daily      Last ordered 90 tablets on 11/22/2021 with 0 refills.     Patient calling stating her [facility] never received this prescription.     Call to [employer] at [phone]. Unable to speak to a representative.     Patient requesting this medication be re-sent.   Medication re-sent per protocol.    Codie PETERSON RN   [facility]

## 2021-12-15 DIAGNOSIS — Z11.59 ENCOUNTER FOR SCREENING FOR OTHER VIRAL DISEASES: ICD-10-CM

## 2021-12-16 NOTE — TELEPHONE ENCOUNTER
Spoke to representative at [facility] and she stated that they did receive the Rybelsus rx but will not start processing the rx until 12/27/2021.  Advised that if the patient wanted it sooner that she could give them a call.      Spoke with patient. She will contact her pharmacy and have them start processing rx sooner.    Geovanna Lezama RN

## 2021-12-23 ENCOUNTER — TRANSFERRED RECORDS (OUTPATIENT)
Dept: HEALTH INFORMATION MANAGEMENT | Facility: CLINIC | Age: 61
End: 2021-12-23

## 2022-02-01 ENCOUNTER — TELEPHONE (OUTPATIENT)
Dept: FAMILY MEDICINE | Facility: CLINIC | Age: 62
End: 2022-02-01

## 2022-02-01 NOTE — TELEPHONE ENCOUNTER
Patient Quality Outreach    Patient is due for the following:   Diabetes -  A1C  Colon Cancer Screening -  Colonoscopy  Cervical Cancer Screening - PAP Needed  Physical  - Due after 2/1/2022    NEXT STEPS:   Schedule a yearly physical    Type of outreach:    Sent [employer] message.      Questions for provider review:    None     Aram Martinez MA

## 2022-02-01 NOTE — LETTER
[facility]  [address] [address] [address] [phone]       February 15, 2022    Teresa Hahn  [address]  [facility] [address]    Dear Teresa,    We care about your health and have reviewed your health plan and are making recommendations based on this review, to optimize your health.    You are in particular need of attention regarding:  -Diabetes  -Colon Cancer Screening  -Cervical Cancer Screening  -Wellness (Physical) Visit     We are recommending that you:  -schedule a LAB ONLY APPOINTMENT to recheck your: A1c test within the next 1-4 weeks.    -schedule a WELLNESS (Physical) APPOINTMENT with me.   I will check fasting labs the same day - nothing to eat except water and meds for 8-10 hours prior.      -schedule a COLONOSCOPY to look for colon cancer (due every 10 years or 5 years in higher risk situations.)        Colon cancer is now the second leading cause of cancer-related deaths in the [place] for both men and women and there are over 130,000 new cases and 50,000 deaths per year from colon cancer.  Colonoscopies can prevent 90-95% of these deaths.  Problem lesions can be removed before they ever become cancer.  This test is not only looking for cancer, but also getting rid of precancerious lesions.    If you are under/uninsured, we recommend you contact the [employer] program. [employer] is a free colorectal cancer screening program that provides colonoscopies for eligible under/uninsured [place] men and women. If you are interested in receiving a free colonoscopy, please call [facility] at [phone] (mention code [employer]) to see if you re eligible.      If you do not wish to do a colonoscopy or cannot afford to do one, at this time, there is another option. It is called a FIT test or Fecal Immunochemical Occult Blood Test (take home stool sample kit).  It does not replace the colonoscopy for colorectal cancer screening, but it can detect  hidden bleeding in the lower colon.  It does need to be repeated every year and if a positive result is obtained, you would be referred for a colonoscopy.          If you have completed either one of these tests at another facility, please call with the details of when and where the tests were done and if they were normal or not. Or have the records sent to our clinic so that we can best coordinate your care.    -schedule a PAP SMEAR EXAM which is due.  Please disregard this reminder if you have had this exam elsewhere within the last year.  It would be helpful for us to have a copy of your recent pap smear report in our file so that we can best coordinate your care.    If you are under/uninsured, we recommend you contact the [facility]. They offer pap smears at no charge or on a sliding fee charge. You can schedule with them at [phone]. Please have them send us the results.      In addition, here is a list of due or overdue Health Maintenance reminders.    Health Maintenance Due   Topic Date Due     Preventive Care Visit  Never done     Zoster (Shingles) Vaccine (1 of 2) Never done     Eye Exam  02/02/2022     Mammogram  12/22/2021     PAP Smear  01/01/2022     Depression Assessment  03/08/2022       To address the above recommendations, we encourage you to contact us at [phone], via Drive or by contacting Central Sched[facility] toll free at [phone] 24 hours a day. They will assist you with finding the most convenient time and location.    Thank you for trusting [facility] and we appreciate the opportunity to serve you.  We look forward to supporting your healthcare needs in the future.    Healthy Regards,    Your [facility] Team

## 2022-02-09 ENCOUNTER — LAB (OUTPATIENT)
Dept: LAB | Facility: CLINIC | Age: 62
End: 2022-02-09
Payer: COMMERCIAL

## 2022-02-09 DIAGNOSIS — E11.9 TYPE 2 DIABETES MELLITUS WITHOUT COMPLICATION, WITHOUT LONG-TERM CURRENT USE OF INSULIN (H): ICD-10-CM

## 2022-02-09 DIAGNOSIS — Z11.59 ENCOUNTER FOR SCREENING FOR OTHER VIRAL DISEASES: ICD-10-CM

## 2022-02-09 LAB
HBA1C MFR BLD: 8 % (ref 0–5.6)
TSH SERPL DL<=0.005 MIU/L-ACNC: 1.93 MU/L (ref 0.4–4)

## 2022-02-09 PROCEDURE — U0003 INFECTIOUS AGENT DETECTION BY NUCLEIC ACID (DNA OR RNA); SEVERE ACUTE RESPIRATORY SYNDROME CORONAVIRUS 2 (SARS-COV-2) (CORONAVIRUS DISEASE [COVID-19]), AMPLIFIED PROBE TECHNIQUE, MAKING USE OF HIGH THROUGHPUT TECHNOLOGIES AS DESCRIBED BY CMS-2020-01-R: HCPCS

## 2022-02-09 PROCEDURE — 84443 ASSAY THYROID STIM HORMONE: CPT

## 2022-02-09 PROCEDURE — U0005 INFEC AGEN DETEC AMPLI PROBE: HCPCS

## 2022-02-09 PROCEDURE — 83036 HEMOGLOBIN GLYCOSYLATED A1C: CPT

## 2022-02-09 PROCEDURE — 36415 COLL VENOUS BLD VENIPUNCTURE: CPT

## 2022-02-10 LAB — SARS-COV-2 RNA RESP QL NAA+PROBE: NEGATIVE

## 2022-02-12 ENCOUNTER — HEALTH MAINTENANCE LETTER (OUTPATIENT)
Age: 62
End: 2022-02-12

## 2022-02-15 NOTE — TELEPHONE ENCOUNTER
Patient Quality Outreach    Patient is due for the following:   Diabetes -  A1C  Colon Cancer Screening -  Colonoscopy  Cervical Cancer Screening - PAP Needed  Physical  - Due after 2/1/2022    NEXT STEPS:   Schedule a yearly physical    Type of outreach:    Sent letter.    Next Steps:  Reach out within 90 days via Phone.    Max number of attempts reached: Yes. Will try again in 90 days if patient still on fail list.    Questions for provider review:    None     Aram Martinez MA

## 2022-03-01 ENCOUNTER — TRANSFERRED RECORDS (OUTPATIENT)
Dept: HEALTH INFORMATION MANAGEMENT | Facility: CLINIC | Age: 62
End: 2022-03-01

## 2022-03-01 LAB — RETINOPATHY: NORMAL

## 2022-04-09 ENCOUNTER — HEALTH MAINTENANCE LETTER (OUTPATIENT)
Age: 62
End: 2022-04-09

## 2022-04-18 DIAGNOSIS — E78.5 HYPERLIPIDEMIA LDL GOAL <100: ICD-10-CM

## 2022-04-19 RX ORDER — ATORVASTATIN CALCIUM 10 MG/1
10 TABLET, FILM COATED ORAL DAILY
Qty: 90 TABLET | Refills: 0 | Status: SHIPPED | OUTPATIENT
Start: 2022-04-19 | End: 2022-04-21

## 2022-04-21 ENCOUNTER — VIRTUAL VISIT (OUTPATIENT)
Dept: FAMILY MEDICINE | Facility: CLINIC | Age: 62
End: 2022-04-21
Payer: COMMERCIAL

## 2022-04-21 DIAGNOSIS — E78.5 HYPERLIPIDEMIA LDL GOAL <100: ICD-10-CM

## 2022-04-21 PROCEDURE — 99213 OFFICE O/P EST LOW 20 MIN: CPT | Mod: 95 | Performed by: NURSE PRACTITIONER

## 2022-04-21 RX ORDER — ATORVASTATIN CALCIUM 10 MG/1
10 TABLET, FILM COATED ORAL DAILY
Qty: 90 TABLET | Refills: 0 | Status: SHIPPED | OUTPATIENT
Start: 2022-04-21 | End: 2022-05-26

## 2022-04-21 NOTE — PROGRESS NOTES
"Teresa is a 61 year old who is being evaluated via a billable video visit.      How would you like to obtain your AVS? MyChart  If the video visit is dropped, the invitation should be resent by: Text to cell phone: [phone]  Will anyone else be joining your video visit? No    Video Start Time: 1:45pm    Assessment & Plan     Hyperlipidemia LDL goal <100  - atorvastatin (LIPITOR) 10 MG tablet; Take 1 tablet (10 mg) by mouth daily       BMI:   Estimated body mass index is 29.34 kg/m  as calculated from the following:    Height as of 11/18/21: 1.549 m (5' 1\").    Weight as of 11/18/21: 70.4 kg (155 lb 4.8 oz).           Return in about 4 weeks (around 5/19/2022) for diabetes recheck.    CARL Dotson Ra, CNP  [facility]    Subjective   Teresa is a 61 year old who presents for the following health issues     HPI Need refill on Atorvastatin 90 day    No concerns    Review of Systems   Constitutional, HEENT, cardiovascular, pulmonary, gi and gu systems are negative, except as otherwise noted.      Objective           Vitals:  No vitals were obtained today due to virtual visit.    Physical Exam   GENERAL: Healthy, alert and no distress  EYES: Eyes grossly normal to inspection.  No discharge or erythema, or obvious scleral/conjunctival abnormalities.  RESP: No audible wheeze, cough, or visible cyanosis.  No visible retractions or increased work of breathing.    SKIN: Visible skin clear. No significant rash, abnormal pigmentation or lesions.  NEURO: Cranial nerves grossly intact.  Mentation and speech appropriate for age.  PSYCH: Mentation appears normal, affect normal/bright, judgement and insight intact, normal speech and appearance well-groomed.                Video-Visit Details    Type of service:  Video Visit    Video End Time:1:52 PM    Originating Location (pt. Location): Home    Distant Location (provider location):  [facility]     Platform used for Video Visit: " [employer]

## 2022-05-23 ENCOUNTER — MYC REFILL (OUTPATIENT)
Dept: FAMILY MEDICINE | Facility: CLINIC | Age: 62
End: 2022-05-23
Payer: COMMERCIAL

## 2022-05-23 DIAGNOSIS — E11.9 TYPE 2 DIABETES MELLITUS WITHOUT COMPLICATION, WITHOUT LONG-TERM CURRENT USE OF INSULIN (H): ICD-10-CM

## 2022-05-24 NOTE — TELEPHONE ENCOUNTER
Medication is being filled for 1 time refill only due to:  to get to scheduled visit.    Next 5 appointments (look out 90 days)    May 26, 2022 10:00 AM  (Arrive by 9:40 AM)  Provider Visit with CARL Dotson Ra, CNP  [facility] ([facility] [place] [facility] ) [address] [facility] [id number] [phone]          Penelope RAO RN

## 2022-05-24 NOTE — PROGRESS NOTES
SUBJECTIVE:                                                    Teresa Hahn is a 56 year old female who presents to clinic today for the following health issues:      Here for discussion about recent breast cancer.  Has questions about breast cancer resources and scheduling. Would like to factor leiden  5 due to having an upcoming surgery.      Patient Active Problem List   Diagnosis     Acute reaction to stress     Arthropathy     Obesity     GERD (gastroesophageal reflux disease)     CARDIOVASCULAR SCREENING; LDL GOAL LESS THAN 160     Anxiety     Health Care Home     TBI (traumatic brain injury) (H)     ADD (attention deficit disorder)     Major depressive disorder, single episode in full remission (H)       No current outpatient prescriptions on file.       ROS:  CONSTITUTIONAL:NEGATIVE for fever, chills, change in weight  Breast: see below  HEME/ALLERGY/IMMUNE: NEGATIVE for bleeding or clotting problems; see below  PSYCHIATRIC: NEGATIVE for changes in mood or affect    Diagnosed on .    Started at [facility] as this was when [employer] and [employer] were iffy.    Mammogram through [facility].  Notes she does have dense breasts. Had the 3D mammogram and was still called back.   Had ultrasound and MRI.  Stereotactic biopsy recommended. This was when she went to [facility].  They redid the scans.     Did the biopsy 17.    Regarding the cancer:  Reports 1.1 cm.   Invasive.  Mixed ductal and lobular features.  E and P positive. Her 2 tunde negative.  Right breast.    2 samples done; one was benign.  KI 67 was 9.8% (she notes low numbers are better).    Anticipating mastectomy in a month.   Will undergo TRAM reconstruction.    Would like to be checked for Factor V Leiden.  2 cousins have it; one  p cancer surgery. The other one did have clots.     Now going to [facility].   Dr. Catie Ross.  Reconstruction is Dr. Binh Lane ([facility]).  Hoping to see Dr. Ochoa with [facility] as Oncologist.     Would like me to see if I  How Severe Is Your Condition?: mild "can feel any nodes. She notes she never could feel the breast lump.   Also wondering about other resources; sounds like perhaps a support group or other.   She has done a lot of research on her own.    OBJECTIVE:                                                    /78 (BP Location: Right arm, Patient Position: Chair, Cuff Size: Adult Regular)  Pulse 87  Temp 97.9  F (36.6  C) (Oral)  Ht 5' 0.75\" (1.543 m)  Wt 176 lb 8 oz (80.1 kg)  SpO2 95%  BMI 33.62 kg/m2  Body mass index is 33.62 kg/(m^2).  GENERAL APPEARANCE: alert and no distress  CV: regular rates and rhythm  LYMPHATICS: I did not feel any axillary nodes with a thorough exam (right axilla)  PSYCH: mentation appears normal and affect normal/bright         ASSESSMENT/PLAN:                                                      Malignant neoplasm of right female breast, unspecified site of breast (H)  Spent time discussing what I know of some of the prognostic markers she has told me. Discussed her upcoming surgery.  Will see if CC can give her some resources/ support groups.  Did give her the phone number/referral to [facility]; she would like to see Dr. Ochoa.   - ONC/HEME ADULT REFERRAL  - CARE COORDINATION REFERRAL    Family history of factor V Leiden mutation  Reviewed with her and completed consent.   This may give surgeon some information that may be helpful in the post op period. She has not had any clots herself.   - Factor 5 leiden mutation analysis      Anticipate will be seeing in the near future for a pre op.    >50 % of the 30 minutes was spent in counselling or coordination of care for the above issues.      Yocasta Moreno MD, MD  [facility]  "

## 2022-05-25 NOTE — TELEPHONE ENCOUNTER
This is a duplicate refill request for metformin - refills sent to the same pharmacy on 5/24/22.

## 2022-05-26 ENCOUNTER — OFFICE VISIT (OUTPATIENT)
Dept: FAMILY MEDICINE | Facility: CLINIC | Age: 62
End: 2022-05-26
Payer: COMMERCIAL

## 2022-05-26 VITALS
BODY MASS INDEX: 28.91 KG/M2 | WEIGHT: 153 LBS | HEART RATE: 84 BPM | DIASTOLIC BLOOD PRESSURE: 80 MMHG | TEMPERATURE: 98.5 F | OXYGEN SATURATION: 97 % | RESPIRATION RATE: 16 BRPM | SYSTOLIC BLOOD PRESSURE: 118 MMHG

## 2022-05-26 DIAGNOSIS — E11.9 TYPE 2 DIABETES MELLITUS WITHOUT COMPLICATION, WITHOUT LONG-TERM CURRENT USE OF INSULIN (H): Primary | ICD-10-CM

## 2022-05-26 DIAGNOSIS — F41.9 ANXIETY: ICD-10-CM

## 2022-05-26 DIAGNOSIS — E78.5 HYPERLIPIDEMIA LDL GOAL <100: ICD-10-CM

## 2022-05-26 LAB
ALBUMIN SERPL-MCNC: 4.2 G/DL (ref 3.4–5)
ALP SERPL-CCNC: 94 U/L (ref 40–150)
ALT SERPL W P-5'-P-CCNC: 61 U/L (ref 0–50)
ANION GAP SERPL CALCULATED.3IONS-SCNC: 8 MMOL/L (ref 3–14)
AST SERPL W P-5'-P-CCNC: 20 U/L (ref 0–45)
BILIRUB SERPL-MCNC: 0.6 MG/DL (ref 0.2–1.3)
BUN SERPL-MCNC: 10 MG/DL (ref 7–30)
CALCIUM SERPL-MCNC: 9.5 MG/DL (ref 8.5–10.1)
CHLORIDE BLD-SCNC: 103 MMOL/L (ref 94–109)
CHOLEST SERPL-MCNC: 162 MG/DL
CO2 SERPL-SCNC: 27 MMOL/L (ref 20–32)
CREAT SERPL-MCNC: 0.6 MG/DL (ref 0.52–1.04)
CREAT UR-MCNC: 29 MG/DL
FASTING STATUS PATIENT QL REPORTED: NO
GFR SERPL CREATININE-BSD FRML MDRD: >90 ML/MIN/1.73M2
GLUCOSE BLD-MCNC: 196 MG/DL (ref 70–99)
HBA1C MFR BLD: 7.3 % (ref 0–5.6)
HDLC SERPL-MCNC: 32 MG/DL
LDLC SERPL CALC-MCNC: 86 MG/DL
MICROALBUMIN UR-MCNC: <5 MG/L
MICROALBUMIN/CREAT UR: NORMAL MG/G{CREAT}
NONHDLC SERPL-MCNC: 130 MG/DL
POTASSIUM BLD-SCNC: 4.7 MMOL/L (ref 3.4–5.3)
PROT SERPL-MCNC: 7.4 G/DL (ref 6.8–8.8)
SODIUM SERPL-SCNC: 138 MMOL/L (ref 133–144)
TRIGL SERPL-MCNC: 222 MG/DL
TSH SERPL DL<=0.005 MIU/L-ACNC: 1.5 MU/L (ref 0.4–4)

## 2022-05-26 PROCEDURE — 82043 UR ALBUMIN QUANTITATIVE: CPT | Performed by: NURSE PRACTITIONER

## 2022-05-26 PROCEDURE — 99214 OFFICE O/P EST MOD 30 MIN: CPT | Performed by: NURSE PRACTITIONER

## 2022-05-26 PROCEDURE — 84443 ASSAY THYROID STIM HORMONE: CPT | Performed by: NURSE PRACTITIONER

## 2022-05-26 PROCEDURE — 80053 COMPREHEN METABOLIC PANEL: CPT | Performed by: NURSE PRACTITIONER

## 2022-05-26 PROCEDURE — 80061 LIPID PANEL: CPT | Performed by: NURSE PRACTITIONER

## 2022-05-26 PROCEDURE — 83036 HEMOGLOBIN GLYCOSYLATED A1C: CPT | Performed by: NURSE PRACTITIONER

## 2022-05-26 PROCEDURE — 99207 PR FOOT EXAM NO CHARGE: CPT | Mod: 25 | Performed by: NURSE PRACTITIONER

## 2022-05-26 PROCEDURE — 36415 COLL VENOUS BLD VENIPUNCTURE: CPT | Performed by: NURSE PRACTITIONER

## 2022-05-26 RX ORDER — ORAL SEMAGLUTIDE 7 MG/1
1 TABLET ORAL DAILY
Qty: 90 TABLET | Refills: 1 | Status: SHIPPED | OUTPATIENT
Start: 2022-05-26 | End: 2022-12-05

## 2022-05-26 RX ORDER — ZOLEDRONIC ACID 0.04 MG/ML
4 INJECTION, SOLUTION INTRAVENOUS
COMMUNITY

## 2022-05-26 RX ORDER — VENLAFAXINE HYDROCHLORIDE 37.5 MG/1
37.5 CAPSULE, EXTENDED RELEASE ORAL DAILY
Qty: 90 CAPSULE | Refills: 1 | Status: SHIPPED | OUTPATIENT
Start: 2022-05-26 | End: 2022-12-02

## 2022-05-26 RX ORDER — ATORVASTATIN CALCIUM 10 MG/1
10 TABLET, FILM COATED ORAL DAILY
Qty: 90 TABLET | Refills: 1 | Status: SHIPPED | OUTPATIENT
Start: 2022-05-26 | End: 2023-03-13

## 2022-05-26 ASSESSMENT — PAIN SCALES - GENERAL: PAINLEVEL: NO PAIN (0)

## 2022-05-26 ASSESSMENT — PATIENT HEALTH QUESTIONNAIRE - PHQ9
SUM OF ALL RESPONSES TO PHQ QUESTIONS 1-9: 3
10. IF YOU CHECKED OFF ANY PROBLEMS, HOW DIFFICULT HAVE THESE PROBLEMS MADE IT FOR YOU TO DO YOUR WORK, TAKE CARE OF THINGS AT HOME, OR GET ALONG WITH OTHER PEOPLE: SOMEWHAT DIFFICULT
SUM OF ALL RESPONSES TO PHQ QUESTIONS 1-9: 3

## 2022-05-26 NOTE — PROGRESS NOTES
{2021 PROVIDER CHARTING PREFERENCE:905222}    Subjective     Teresa Hahn is a 61 year old female who presents to clinic today for the following health issues accompanied by her {accompanied to visit:700207}:    HPI     {SUPERLIST (Optional):659696}  {additonal problems for provider to add (Optional):630203}    Review of Systems   {ROS COMP (Optional):157283}      Objective    LMP 11/18/2013   There is no height or weight on file to calculate BMI.  Physical Exam   {Exam List (Optional):160372}    {Diagnostic Test Results (Optional):205204}

## 2022-05-26 NOTE — PROGRESS NOTES
"  Assessment & Plan     Type 2 diabetes mellitus without complication, without long-term current use of insulin (H)  Well controlled with medication, HGB A1C improved 7.3 today  - TSH with free T4 reflex; Future  - HEMOGLOBIN A1C; Future  - Albumin Random Urine Quantitative with Creat Ratio; Future  - COMPREHENSIVE METABOLIC PANEL ; Future  - TSH with free T4 reflex  - HEMOGLOBIN A1C  - Albumin Random Urine Quantitative with Creat Ratio  - COMPREHENSIVE METABOLIC PANEL   - metFORMIN (GLUCOPHAGE) 1000 MG tablet; TAKE 1 TABLET TWICE DAILY  WITH MEALS  - Semaglutide (RYBELSUS) 7 MG TABS; Take 1 tablet by mouth daily      Hyperlipidemia LDL goal <100  Well controlled with medication, will refill Lipitor today  - Lipid panel reflex to direct LDL Fasting; Future  - COMPREHENSIVE METABOLIC PANEL ; Future  - Lipid panel reflex to direct LDL Fasting  - COMPREHENSIVE METABOLIC PANEL   - atorvastatin (LIPITOR) 10 MG tablet; Take 1 tablet (10 mg) by mouth daily    Anxiety  Well controlled, reports some increased anxiety- will notify provider if worsens  - venlafaxine (EFFEXOR XR) 37.5 MG 24 hr capsule; Take 1 capsule (37.5 mg) by mouth daily             BMI:   Estimated body mass index is 28.91 kg/m  as calculated from the following:    Height as of 11/18/21: 1.549 m (5' 1\").    Weight as of this encounter: 69.4 kg (153 lb).   Weight management plan: Discussed healthy diet and exercise guidelines    Follow up in 5 months for diabetic follow up     Celeste Valenzuela, CARL DOMINGUEZ    I have discussed the patient's presenting complaint(s) with the np student and agree with the history, physical exam and plan as documented above. Her progress note reflects our assessment and plan.    Sandie Dalal RN, FNP student  [facility] TERRELL Moore is a 61 year old who presents for the following health issues blurred vision for 3 months. Patient states she was seen by an eye doctor for blurred vision in March and " states they said her eyes were fine. Patient states she the blurred vision persists and feels that it is difficult to see things far away while driving. Patient states she plans to see her regular eye doctor for a second opinion. Patient denies chest pain, dizziness, or numbness or extremities.    History of Present Illness       Reason for visit:  Diabetes Check    She eats 0-1 servings of fruits and vegetables daily.She consumes 0 sweetened beverage(s) daily.She exercises with enough effort to increase her heart rate 9 or less minutes per day.  She exercises with enough effort to increase her heart rate 3 or less days per week.   She is taking medications regularly.    Today's PHQ-9         PHQ-9 Total Score: 3    PHQ-9 Q9 Thoughts of better off dead/self-harm past 2 weeks :   Not at all    How difficult have these problems made it for you to do your work, take care of things at home, or get along with other people: Somewhat difficult       Diabetes Follow-up      How often are you checking your blood sugar? Not at all    What concerns do you have today about your diabetes? None     Do you have any of these symptoms? (Select all that apply)  Blurry vision    Have you had a diabetic eye exam in the last 12 months? Yes- Date of last eye exam: March 2022,  Location: [facility]         BP Readings from Last 2 Encounters:   05/26/22 118/80   11/18/21 94/60     Hemoglobin A1C POCT (%)   Date Value   05/25/2021 9.5 (H)   01/18/2021 8.4 (H)     Hemoglobin A1C (%)   Date Value   05/26/2022 7.3 (H)   02/09/2022 8.0 (H)     LDL Cholesterol Calculated (mg/dL)   Date Value   05/26/2022 86   11/18/2021 67   05/25/2021 58   01/18/2021 125 (H)           BP Readings from Last 2 Encounters:   05/26/22 118/80   11/18/21 94/60     Hemoglobin A1C POCT (%)   Date Value   05/25/2021 9.5 (H)   01/18/2021 8.4 (H)     Hemoglobin A1C (%)   Date Value   05/26/2022 7.3 (H)   02/09/2022 8.0 (H)     LDL Cholesterol Calculated (mg/dL)    Date Value   05/26/2022 86   11/18/2021 67   05/25/2021 58   01/18/2021 125 (H)         Review of Systems   Constitutional, HEENT, cardiovascular, pulmonary, gi and gu systems are negative, except as otherwise noted.      Objective    /80 (BP Location: Right arm, Patient Position: Sitting, Cuff Size: Adult Regular)   Pulse 84   Temp 98.5  F (36.9  C) (Oral)   Resp 16   Wt 69.4 kg (153 lb)   LMP 11/18/2013   SpO2 97%   Breastfeeding No   BMI 28.91 kg/m    Body mass index is 28.91 kg/m .  Physical Exam   GENERAL: healthy, alert and no distress  HENT: ear canals and TM's normal, nose and mouth without ulcers or lesions  NECK: no adenopathy, no asymmetry, masses, or scars and thyroid normal to palpation  RESP: lungs clear to auscultation - no rales, rhonchi or wheezes  CV: regular rate and rhythm, normal S1 S2, no S3 or S4, no murmur, click or rub, no peripheral edema and peripheral pulses strong  ABDOMEN: soft, nontender, no hepatosplenomegaly, no masses and bowel sounds normal  MS: no gross musculoskeletal defects noted, no edema  SKIN: no suspicious lesions or rashes  NEURO: Normal strength and tone, mentation intact and speech normal. Monofilament test- normal sensation throughout feet bilat except heels decreased sensation  Foot exam:  Normal monofilament exam

## 2022-06-28 ENCOUNTER — TRANSFERRED RECORDS (OUTPATIENT)
Dept: FAMILY MEDICINE | Facility: CLINIC | Age: 62
End: 2022-06-28

## 2022-06-28 LAB — RETINOPATHY: NEGATIVE

## 2022-07-18 ENCOUNTER — TELEPHONE (OUTPATIENT)
Dept: FAMILY MEDICINE | Facility: CLINIC | Age: 62
End: 2022-07-18

## 2022-07-18 NOTE — TELEPHONE ENCOUNTER
Prior Authorization Retail Medication Request    Medication/Dose: Rybelsus 7 mg   ICD code (if different than what is on RX):  E11.9  Previously Tried and Failed:  She is on Metformin, but needs in addition to help control diabetes.   Rationale:  See above    Insurance Name:  [employer]   Insurance ID:  [id number]       Pharmacy Information (if different than what is on RX)  [facility]   Phone:

## 2022-07-20 NOTE — TELEPHONE ENCOUNTER
Central Prior Authorization Team  Phone: [phone]    PA Initiation    Medication: RYBELSUS 7 MG TABS  Insurance Company: [facility] [employer] - Phone [phone] Fax [phone]  Ph[facility] [id number] [place] [address]  Filling Pharmacy Phone: [phone]  Filling Pharmacy Fax:    Start Date: 7/20/2022

## 2022-07-20 NOTE — TELEPHONE ENCOUNTER
Prior Authorization Approval    Authorization Effective Date: 7/20/2022  Authorization Expiration Date: 7/19/2025  Medication: RYBELSUS 7 MG TABS- APPROVED   Approved Dose/Quantity:   Reference #:     Insurance Company: [facility] [employer] - Phone [phone] Fax [phone]  Expected CoPay:       CoPay Card Available:      Foundation Assistance Needed:    Which Pharmacy is filling the prescription (Not needed for infusion/clinic administered): [facility] [id number] [place] [address]  Pharmacy Notified: No  Patient Notified: Yes- left a message

## 2022-09-23 ENCOUNTER — ALLIED HEALTH/NURSE VISIT (OUTPATIENT)
Dept: INTERNAL MEDICINE | Facility: CLINIC | Age: 62
End: 2022-09-23
Payer: COMMERCIAL

## 2022-09-23 DIAGNOSIS — Z23 NEED FOR VACCINATION: Primary | ICD-10-CM

## 2022-09-23 DIAGNOSIS — Z23 HIGH PRIORITY FOR 2019-NCOV VACCINE: ICD-10-CM

## 2022-09-23 PROBLEM — D12.6 ADENOMATOUS COLON POLYP: Status: ACTIVE | Noted: 2018-07-26

## 2022-09-23 PROBLEM — D62 POSTOPERATIVE ANEMIA DUE TO ACUTE BLOOD LOSS: Status: ACTIVE | Noted: 2017-03-17

## 2022-09-23 PROBLEM — Z90.11 STATUS POST RIGHT MASTECTOMY: Status: ACTIVE | Noted: 2017-03-17

## 2022-09-23 PROBLEM — Z98.890 STATUS POST RIGHT BREAST RECONSTRUCTION: Status: ACTIVE | Noted: 2017-03-17

## 2022-09-23 PROCEDURE — 0124A COVID-19,PF,PFIZER BOOSTER BIVALENT: CPT

## 2022-09-23 PROCEDURE — 99207 PR NO CHARGE NURSE ONLY: CPT

## 2022-09-23 PROCEDURE — 91312 COVID-19,PF,PFIZER BOOSTER BIVALENT: CPT

## 2022-09-30 ENCOUNTER — OFFICE VISIT (OUTPATIENT)
Dept: FAMILY MEDICINE | Facility: CLINIC | Age: 62
End: 2022-09-30
Payer: COMMERCIAL

## 2022-09-30 VITALS
OXYGEN SATURATION: 97 % | DIASTOLIC BLOOD PRESSURE: 86 MMHG | HEART RATE: 100 BPM | BODY MASS INDEX: 30.08 KG/M2 | SYSTOLIC BLOOD PRESSURE: 138 MMHG | WEIGHT: 159.2 LBS | TEMPERATURE: 97 F

## 2022-09-30 DIAGNOSIS — Z01.818 PRE-OP EXAM: Primary | ICD-10-CM

## 2022-09-30 DIAGNOSIS — E11.9 TYPE 2 DIABETES MELLITUS WITHOUT COMPLICATION, WITHOUT LONG-TERM CURRENT USE OF INSULIN (H): ICD-10-CM

## 2022-09-30 DIAGNOSIS — Z98.1 S/P ANKLE ARTHRODESIS: ICD-10-CM

## 2022-09-30 PROBLEM — D62 POSTOPERATIVE ANEMIA DUE TO ACUTE BLOOD LOSS: Status: RESOLVED | Noted: 2017-03-17 | Resolved: 2022-09-30

## 2022-09-30 PROBLEM — C50.911 MALIGNANT NEOPLASM OF RIGHT FEMALE BREAST (H): Status: RESOLVED | Noted: 2017-02-22 | Resolved: 2022-09-30

## 2022-09-30 LAB — HBA1C MFR BLD: 8.2 % (ref 0–5.6)

## 2022-09-30 PROCEDURE — 90677 PCV20 VACCINE IM: CPT | Performed by: FAMILY MEDICINE

## 2022-09-30 PROCEDURE — 99214 OFFICE O/P EST MOD 30 MIN: CPT | Mod: 25 | Performed by: FAMILY MEDICINE

## 2022-09-30 PROCEDURE — 93000 ELECTROCARDIOGRAM COMPLETE: CPT | Performed by: FAMILY MEDICINE

## 2022-09-30 PROCEDURE — 83036 HEMOGLOBIN GLYCOSYLATED A1C: CPT | Performed by: FAMILY MEDICINE

## 2022-09-30 PROCEDURE — 36415 COLL VENOUS BLD VENIPUNCTURE: CPT | Performed by: FAMILY MEDICINE

## 2022-09-30 PROCEDURE — 90471 IMMUNIZATION ADMIN: CPT | Performed by: FAMILY MEDICINE

## 2022-09-30 ASSESSMENT — PAIN SCALES - GENERAL: PAINLEVEL: NO PAIN (0)

## 2022-09-30 NOTE — H&P (VIEW-ONLY)
[facility]  [address] [facility] [address]  Phone: [phone]  Primary Provider: Celeste Valenzuela Ra  Pre-op Performing Provider: JOSÉ LUIS WINTER      PREOPERATIVE EVALUATION:  Today's date: 9/30/2022    Teresa Hahn is a 61 year old female who presents for a preoperative evaluation.    Surgical Information:  Surgery/Procedure: RIGHT ANKLE FUSION CONVERSION TO A TOTAL ANKLE ARTHROPLASTY  Surgery Location: [facility]  Surgeon: Dr BABATUNDE Bagley  Surgery Date: 10/17/2022  Time of Surgery: 0950  Where patient plans to recover: At home with family  Fax number for surgical facility: Note does not need to be faxed, will be available electronically in Epic.    Type of Anesthesia Anticipated: General    Assessment and Plan    (Z01.818) Pre-op exam  (primary encounter diagnosis)  Comment: cleared for surgery without further eval  Plan: EKG 12-lead complete w/read - Clinics            (Z98.1) S/P ankle arthrodesis  Comment:   Plan:     (E11.9) Type 2 diabetes mellitus without complication, without long-term current use of insulin (H)  Comment:   Plan: Hemoglobin A1c              RTC in 3m fo DM f/u    José Luis Winter MD      Subjective     HPI related to upcoming procedure: s/p fusion for ankle OA, now to have artificial joint.  Feeling well over all, no acute concerns.    Denies CP, palpitations, edema, dyspnea, HA, vision changes.  No n/v, change in bowel or urinary habit.    Glucose has been trending down.      Health Care Directive:  Patient does not have a Health Care Directive or Living Will:     Preoperative Review of :   reviewed - no record of controlled substances prescribed.      Status of Chronic Conditions:  DEPRESSION - Patient has a long history of Depression of moderate severity requiring medication for control with recent symptoms being stable..Current symptoms of depression include none.     DIABETES - Patient has a longstanding history of  DiabetesType Type II . Patient is being treated with oral agents and denies significant side effects. Control has been fair. Complicating factors include but are not limited to: hypertension and hyperlipidemia.     HYPERLIPIDEMIA - Patient has a long history of significant Hyperlipidemia requiring medication for treatment with recent good control. Patient reports no problems or side effects with the medication.     HYPERTENSION - Patient has longstanding history of HTN , currently denies any symptoms referable to elevated blood pressure. Specifically denies chest pain, palpitations, dyspnea, orthopnea, PND or peripheral edema. Blood pressure readings have been in normal range. Current medication regimen is as listed below. Patient denies any side effects of medication.       Review of Systems  CONSTITUTIONAL: NEGATIVE for fever, chills, change in weight  INTEGUMENTARY/SKIN: NEGATIVE for worrisome rashes, moles or lesions  EYES: NEGATIVE for vision changes or irritation  ENT/MOUTH: NEGATIVE for ear, mouth and throat problems  RESP: NEGATIVE for significant cough or SOB  CV: NEGATIVE for chest pain, palpitations or peripheral edema  GI: NEGATIVE for nausea, abdominal pain, heartburn, or change in bowel habits  : NEGATIVE for frequency, dysuria, or hematuria  MUSCULOSKELETAL: NEGATIVE for significant arthralgias or myalgia  NEURO: NEGATIVE for weakness, dizziness or paresthesias  ENDOCRINE: NEGATIVE for temperature intolerance, skin/hair changes  HEME: NEGATIVE for bleeding problems  PSYCHIATRIC: NEGATIVE for changes in mood or affect    Patient Active Problem List    Diagnosis Date Noted     Status post small bowel resection 07/19/2019     Priority: Medium     Adenomatous colon polyp 07/26/2018     Priority: Medium     Formatting of this note might be different from the original.  Colonoscopy 7/2018 polyp, repeat in 5 years       Type 2 diabetes mellitus without complication, without long-term current use of insulin  (H) 2018     Priority: Medium     Hyperlipidemia LDL goal <100 2017     Priority: Medium     Postoperative anemia due to acute blood loss 2017     Priority: Medium     Status post right breast reconstruction 2017     Priority: Medium     Status post right mastectomy 2017     Priority: Medium     Malignant neoplasm of right female breast (H) 2017     Priority: Medium     ADD (attention deficit disorder) 2013     Priority: Medium     Hyperplastic colon polyp 2012     Priority: Medium     Formatting of this note might be different from the original.  Colonoscopy 2012 normal repeat in 10 years       Anxiety 2010     Priority: Medium     GERD (gastroesophageal reflux disease) 2008     Priority: Medium     Arthropathy      Priority: Medium     from right ankle fracture.  Problem list name updated by automated process. Provider to review       Acute reaction to stress 2006     Priority: Medium     Problem list name updated by automated process. Provider to review        Past Medical History:   Diagnosis Date     arthritis     from right ankle fracture.     Diabetes (H)      PONV (postoperative nausea and vomiting)      TBI (traumatic brain injury) (H) 2012    age 2. coma x 3 weeks.      Past Surgical History:   Procedure Laterality Date     ABDOMEN SURGERY       x 4      ENT SURGERY      Tonsillectomy     MAMMOPLASTY REDUCTION Left 2017    Procedure: MAMMOPLASTY REDUCTION;  LEFT BREAST REDUCTION MAMMOPLASTY ; RIGHT BREAST REVISION TRAM FLAP ;  Surgeon: Charan Lane MD;  Location: [facility]     MASTECTOMY, RECONSTRUCT BREAST, COMBINED Right 2017     REVISION TRAM FLAP Right 2017    Procedure: REVISION TRAM FLAP;;  Surgeon: Charan Lane MD;  Location: [facility]     SURGICAL HISTORY OF -   2004    r leg broken in several areas; now arthritis. 3 surgeries     SURGICAL HISTORY OF -       right ankle fusion      SURGICAL HISTORY OF -  Left 12/30/2018    incisional hernia repair and small bowel resection (incarcerated)     Current Outpatient Medications   Medication Sig Dispense Refill     ACE/ARB/ARNI NOT PRESCRIBED, INTENTIONAL, Please choose reason not prescribed, below       atorvastatin (LIPITOR) 10 MG tablet Take 1 tablet (10 mg) by mouth daily 90 tablet 1     blood glucose (ACCU-CHEK GUIDE) test strip Use to test blood sugar 1 time daily or as directed. 50 strip 11     blood glucose (NO BRAND SPECIFIED) lancets standard Use to test blood sugar 1 times daily or as directed 1 each 0     blood glucose (NO BRAND SPECIFIED) test strip Use to test blood sugars 1 times daily or as directed 100 strip 1     blood glucose monitoring (NO BRAND SPECIFIED) meter device kit Use to test blood sugar 1 times daily or as directed.  Please give needed supplies as well, including lancets (#100), test strips (#100) and control solution. 1 kit 0     blood glucose monitoring (SOFTCLIX) lancets Use to test blood sugar 1 time daily or as directed. 100 each 11     exemestane (AROMASIN) 25 MG tablet Take 25 mg by mouth daily       metFORMIN (GLUCOPHAGE) 1000 MG tablet TAKE 1 TABLET TWICE DAILY  WITH MEALS 180 tablet 1     Semaglutide (RYBELSUS) 7 MG TABS Take 1 tablet by mouth daily 90 tablet 1     venlafaxine (EFFEXOR XR) 37.5 MG 24 hr capsule Take 1 capsule (37.5 mg) by mouth daily 90 capsule 1     zoledronic acid (ZOMETA) 4 MG/100ML SOLN Inject 4 mg into the vein every 6 months         Allergies   Allergen Reactions     Nickel Rash        Social History     Tobacco Use     Smoking status: Never Smoker     Smokeless tobacco: Never Used   Substance Use Topics     Alcohol use: Yes     Comment: 1/month        History   Drug Use No         Objective     /86 (BP Location: Right arm, Patient Position: Sitting, Cuff Size: Adult Regular)   Pulse 100   Temp 97  F (36.1  C) (Oral)   Wt 72.2 kg (159 lb 3.2 oz)   LMP 11/18/2013   SpO2 97%    BMI 30.08 kg/m      Physical Exam    GENERAL APPEARANCE: healthy, alert and no distress     EYES: EOMI, PERRL     HENT: ear canals and TM's normal and nose and mouth without ulcers or lesions     NECK: no adenopathy, no asymmetry, masses, or scars and thyroid normal to palpation     RESP: lungs clear to auscultation - no rales, rhonchi or wheezes     CV: regular rates and rhythm, normal S1 S2, no S3 or S4 and no murmur, click or rub     ABDOMEN:  soft, nontender, no HSM or masses and bowel sounds normal     MS: extremities normal- no gross deformities noted, no evidence of inflammation in joints, FROM in all extremities.     SKIN: no suspicious lesions or rashes     NEURO: Normal strength and tone, sensory exam grossly normal, mentation intact and speech normal     PSYCH: mentation appears normal. and affect normal/bright     LYMPHATICS: No cervical adenopathy    Recent Labs   Lab Test 05/26/22  0949 02/09/22  1453 11/18/21  1109   HGB  --   --  15.3   PLT  --   --  203     --  137   POTASSIUM 4.7  --  4.1   CR 0.60  --  0.65   A1C 7.3* 8.0* 8.2*        Diagnostics:  Labs pending at this time.  Results will be reviewed when available.   EKG: appears normal, NSR, normal axis, normal intervals, no acute ST/T changes c/w ischemia, no LVH by voltage criteria, unchanged from previous tracings    Revised Cardiac Risk Index (RCRI):  The patient has the following serious cardiovascular risks for perioperative complications:   - No serious cardiac risks = 0 points     RCRI Interpretation: 0 points: Class I (very low risk - 0.4% complication rate)           Signed Electronically by: José Luis Olvera MD  Copy of this evaluation report is provided to requesting physician.

## 2022-09-30 NOTE — PROGRESS NOTES
[facility]  [address] [address] [facility] [address] [address]  Phone: [phone]  Primary Provider: Celeste Valenzuela Ra  Pre-op Performing Provider: JOSÉ LUIS WINTER      PREOPERATIVE EVALUATION:  Today's date: 9/30/2022    Teresa Hahn is a 61 year old female who presents for a preoperative evaluation.    Surgical Information:  Surgery/Procedure: RIGHT ANKLE FUSION CONVERSION TO A TOTAL ANKLE ARTHROPLASTY  Surgery Location: [facility]  Surgeon: Dr BABATUNDE Bagley  Surgery Date: 10/17/2022  Time of Surgery: 0950  Where patient plans to recover: At home with family  Fax number for surgical facility: Note does not need to be faxed, will be available electronically in Epic.    Type of Anesthesia Anticipated: General    Assessment and Plan    (Z01.818) Pre-op exam  (primary encounter diagnosis)  Comment: cleared for surgery without further eval  Plan: EKG 12-lead complete w/read - Clinics            (Z98.1) S/P ankle arthrodesis  Comment:   Plan:     (E11.9) Type 2 diabetes mellitus without complication, without long-term current use of insulin (H)  Comment:   Plan: Hemoglobin A1c              RTC in 3m fo DM f/u    José Luis Winter MD      Subjective     HPI related to upcoming procedure: s/p fusion for ankle OA, now to have artificial joint.  Feeling well over all, no acute concerns.    Denies CP, palpitations, edema, dyspnea, HA, vision changes.  No n/v, change in bowel or urinary habit.    Glucose has been trending down.      Health Care Directive:  Patient does not have a Health Care Directive or Living Will:     Preoperative Review of :   reviewed - no record of controlled substances prescribed.      Status of Chronic Conditions:  DEPRESSION - Patient has a long history of Depression of moderate severity requiring medication for control with recent symptoms being stable..Current symptoms of depression include none.     DIABETES - Patient has a longstanding history of  DiabetesType Type II . Patient is being treated with oral agents and denies significant side effects. Control has been fair. Complicating factors include but are not limited to: hypertension and hyperlipidemia.     HYPERLIPIDEMIA - Patient has a long history of significant Hyperlipidemia requiring medication for treatment with recent good control. Patient reports no problems or side effects with the medication.     HYPERTENSION - Patient has longstanding history of HTN , currently denies any symptoms referable to elevated blood pressure. Specifically denies chest pain, palpitations, dyspnea, orthopnea, PND or peripheral edema. Blood pressure readings have been in normal range. Current medication regimen is as listed below. Patient denies any side effects of medication.       Review of Systems  CONSTITUTIONAL: NEGATIVE for fever, chills, change in weight  INTEGUMENTARY/SKIN: NEGATIVE for worrisome rashes, moles or lesions  EYES: NEGATIVE for vision changes or irritation  ENT/MOUTH: NEGATIVE for ear, mouth and throat problems  RESP: NEGATIVE for significant cough or SOB  CV: NEGATIVE for chest pain, palpitations or peripheral edema  GI: NEGATIVE for nausea, abdominal pain, heartburn, or change in bowel habits  : NEGATIVE for frequency, dysuria, or hematuria  MUSCULOSKELETAL: NEGATIVE for significant arthralgias or myalgia  NEURO: NEGATIVE for weakness, dizziness or paresthesias  ENDOCRINE: NEGATIVE for temperature intolerance, skin/hair changes  HEME: NEGATIVE for bleeding problems  PSYCHIATRIC: NEGATIVE for changes in mood or affect    Patient Active Problem List    Diagnosis Date Noted     Status post small bowel resection 07/19/2019     Priority: Medium     Adenomatous colon polyp 07/26/2018     Priority: Medium     Formatting of this note might be different from the original.  Colonoscopy 7/2018 polyp, repeat in 5 years       Type 2 diabetes mellitus without complication, without long-term current use of insulin  (H) 2018     Priority: Medium     Hyperlipidemia LDL goal <100 2017     Priority: Medium     Postoperative anemia due to acute blood loss 2017     Priority: Medium     Status post right breast reconstruction 2017     Priority: Medium     Status post right mastectomy 2017     Priority: Medium     Malignant neoplasm of right female breast (H) 2017     Priority: Medium     ADD (attention deficit disorder) 2013     Priority: Medium     Hyperplastic colon polyp 2012     Priority: Medium     Formatting of this note might be different from the original.  Colonoscopy 2012 normal repeat in 10 years       Anxiety 2010     Priority: Medium     GERD (gastroesophageal reflux disease) 2008     Priority: Medium     Arthropathy      Priority: Medium     from right ankle fracture.  Problem list name updated by automated process. Provider to review       Acute reaction to stress 2006     Priority: Medium     Problem list name updated by automated process. Provider to review        Past Medical History:   Diagnosis Date     arthritis     from right ankle fracture.     Diabetes (H)      PONV (postoperative nausea and vomiting)      TBI (traumatic brain injury) (H) 2012    age 2. coma x 3 weeks.      Past Surgical History:   Procedure Laterality Date     ABDOMEN SURGERY       x 4      ENT SURGERY      Tonsillectomy     MAMMOPLASTY REDUCTION Left 2017    Procedure: MAMMOPLASTY REDUCTION;  LEFT BREAST REDUCTION MAMMOPLASTY ; RIGHT BREAST REVISION TRAM FLAP ;  Surgeon: Charan Lane MD;  Location: [facility]     MASTECTOMY, RECONSTRUCT BREAST, COMBINED Right 2017     REVISION TRAM FLAP Right 2017    Procedure: REVISION TRAM FLAP;;  Surgeon: Charan Lane MD;  Location: [facility]     SURGICAL HISTORY OF -   2004    r leg broken in several areas; now arthritis. 3 surgeries     SURGICAL HISTORY OF -       right ankle fusion      SURGICAL HISTORY OF -  Left 12/30/2018    incisional hernia repair and small bowel resection (incarcerated)     Current Outpatient Medications   Medication Sig Dispense Refill     ACE/ARB/ARNI NOT PRESCRIBED, INTENTIONAL, Please choose reason not prescribed, below       atorvastatin (LIPITOR) 10 MG tablet Take 1 tablet (10 mg) by mouth daily 90 tablet 1     blood glucose (ACCU-CHEK GUIDE) test strip Use to test blood sugar 1 time daily or as directed. 50 strip 11     blood glucose (NO BRAND SPECIFIED) lancets standard Use to test blood sugar 1 times daily or as directed 1 each 0     blood glucose (NO BRAND SPECIFIED) test strip Use to test blood sugars 1 times daily or as directed 100 strip 1     blood glucose monitoring (NO BRAND SPECIFIED) meter device kit Use to test blood sugar 1 times daily or as directed.  Please give needed supplies as well, including lancets (#100), test strips (#100) and control solution. 1 kit 0     blood glucose monitoring (SOFTCLIX) lancets Use to test blood sugar 1 time daily or as directed. 100 each 11     exemestane (AROMASIN) 25 MG tablet Take 25 mg by mouth daily       metFORMIN (GLUCOPHAGE) 1000 MG tablet TAKE 1 TABLET TWICE DAILY  WITH MEALS 180 tablet 1     Semaglutide (RYBELSUS) 7 MG TABS Take 1 tablet by mouth daily 90 tablet 1     venlafaxine (EFFEXOR XR) 37.5 MG 24 hr capsule Take 1 capsule (37.5 mg) by mouth daily 90 capsule 1     zoledronic acid (ZOMETA) 4 MG/100ML SOLN Inject 4 mg into the vein every 6 months         Allergies   Allergen Reactions     Nickel Rash        Social History     Tobacco Use     Smoking status: Never Smoker     Smokeless tobacco: Never Used   Substance Use Topics     Alcohol use: Yes     Comment: 1/month        History   Drug Use No         Objective     /86 (BP Location: Right arm, Patient Position: Sitting, Cuff Size: Adult Regular)   Pulse 100   Temp 97  F (36.1  C) (Oral)   Wt 72.2 kg (159 lb 3.2 oz)   LMP 11/18/2013   SpO2 97%    BMI 30.08 kg/m      Physical Exam    GENERAL APPEARANCE: healthy, alert and no distress     EYES: EOMI, PERRL     HENT: ear canals and TM's normal and nose and mouth without ulcers or lesions     NECK: no adenopathy, no asymmetry, masses, or scars and thyroid normal to palpation     RESP: lungs clear to auscultation - no rales, rhonchi or wheezes     CV: regular rates and rhythm, normal S1 S2, no S3 or S4 and no murmur, click or rub     ABDOMEN:  soft, nontender, no HSM or masses and bowel sounds normal     MS: extremities normal- no gross deformities noted, no evidence of inflammation in joints, FROM in all extremities.     SKIN: no suspicious lesions or rashes     NEURO: Normal strength and tone, sensory exam grossly normal, mentation intact and speech normal     PSYCH: mentation appears normal. and affect normal/bright     LYMPHATICS: No cervical adenopathy    Recent Labs   Lab Test 05/26/22  0949 02/09/22  1453 11/18/21  1109   HGB  --   --  15.3   PLT  --   --  203     --  137   POTASSIUM 4.7  --  4.1   CR 0.60  --  0.65   A1C 7.3* 8.0* 8.2*        Diagnostics:  Labs pending at this time.  Results will be reviewed when available.   EKG: appears normal, NSR, normal axis, normal intervals, no acute ST/T changes c/w ischemia, no LVH by voltage criteria, unchanged from previous tracings    Revised Cardiac Risk Index (RCRI):  The patient has the following serious cardiovascular risks for perioperative complications:   - No serious cardiac risks = 0 points     RCRI Interpretation: 0 points: Class I (very low risk - 0.4% complication rate)           Signed Electronically by: José Luis Olvera MD  Copy of this evaluation report is provided to requesting physician.

## 2022-10-09 ENCOUNTER — HEALTH MAINTENANCE LETTER (OUTPATIENT)
Age: 62
End: 2022-10-09

## 2022-10-14 NOTE — PROGRESS NOTES
PTA medications updated by Medication Scribe prior to surgery via phone call with patient (last doses completed by Nurse)     Medication history sources: Patient, Surescripts and H&P  In the past week, patient estimated taking medication this percent of the time: Greater than 90%  Adherence assessment: N/A Not Observed    Significant changes made to the medication list:  None      Additional medication history information:   Patient was advised to bring: Exemestane     Medication reconciliation completed by provider prior to medication history? No    Time spent in this activity: 35 minutes    The information provided in this note is only as accurate as the sources available at the time of update(s)    Prior to Admission medications    Medication Sig Last Dose Taking? Auth Provider Long Term End Date   atorvastatin (LIPITOR) 10 MG tablet Take 1 tablet (10 mg) by mouth daily  at pm Yes Celeste Valenzuela Ra, APRN CNP Yes    Calcium Carbonate Antacid (TUMS PO) Take 1-2 chew tab by mouth as needed  at prn Yes Reported, Patient     exemestane (AROMASIN) 25 MG tablet Take 25 mg by mouth daily  at am Yes Reported, Patient Yes    metFORMIN (GLUCOPHAGE) 1000 MG tablet TAKE 1 TABLET TWICE DAILY  WITH MEALS  at pm Yes Celeste Valenzuela Ra, APRN CNP Yes    Semaglutide (RYBELSUS) 7 MG TABS Take 1 tablet by mouth daily  at am Yes Celeste Valenzuela Ra, APRN CNP     venlafaxine (EFFEXOR XR) 37.5 MG 24 hr capsule Take 1 capsule (37.5 mg) by mouth daily  at am Yes Celeste Valenzuela Ra, APRN CNP Yes    VITAMIN D PO Take 1 capsule by mouth daily 10/14/2022 at am Yes Reported, Patient     zoledronic acid (ZOMETA) 4 MG/100ML SOLN Inject 4 mg into the vein every 6 months September 2022 at Unknown Yes Reported, Patient Yes    ACE/ARB/ARNI NOT PRESCRIBED, INTENTIONAL, Please choose reason not prescribed, below   Yocasta Moreno MD     blood glucose (ACCU-CHEK GUIDE) test strip Use to test blood sugar 1 time daily or as directed.   Yocasta Moreno,  MD     blood glucose (NO BRAND SPECIFIED) lancets standard Use to test blood sugar 1 times daily or as directed   Alfredo Mendoza PA-C     blood glucose (NO BRAND SPECIFIED) test strip Use to test blood sugars 1 times daily or as directed   Alfredo Mendoza PA-C     blood glucose monitoring (NO BRAND SPECIFIED) meter device kit Use to test blood sugar 1 times daily or as directed.  Please give needed supplies as well, including lancets (#100), test strips (#100) and control solution.   Yocasta Moreno MD     blood glucose monitoring (SOFTCLIX) lancets Use to test blood sugar 1 time daily or as directed.   Yocasta Moreno MD       Medication history completed by:    Ernesto Villar CPhT  Medication [facility]

## 2022-10-15 ENCOUNTER — MYC MEDICAL ADVICE (OUTPATIENT)
Dept: FAMILY MEDICINE | Facility: CLINIC | Age: 62
End: 2022-10-15

## 2022-10-17 ENCOUNTER — HOSPITAL ENCOUNTER (OUTPATIENT)
Facility: CLINIC | Age: 62
Setting detail: OBSERVATION
Discharge: HOME | End: 2022-10-18
Attending: ORTHOPAEDIC SURGERY | Admitting: ORTHOPAEDIC SURGERY
Payer: COMMERCIAL

## 2022-10-17 ENCOUNTER — ANESTHESIA EVENT (OUTPATIENT)
Dept: SURGERY | Facility: CLINIC | Age: 62
End: 2022-10-17
Payer: COMMERCIAL

## 2022-10-17 ENCOUNTER — APPOINTMENT (OUTPATIENT)
Dept: GENERAL RADIOLOGY | Facility: CLINIC | Age: 62
End: 2022-10-17
Attending: ORTHOPAEDIC SURGERY
Payer: COMMERCIAL

## 2022-10-17 ENCOUNTER — ANESTHESIA (OUTPATIENT)
Dept: SURGERY | Facility: CLINIC | Age: 62
End: 2022-10-17
Payer: COMMERCIAL

## 2022-10-17 DIAGNOSIS — Z96.661 S/P ANKLE JOINT REPLACEMENT, RIGHT: Primary | ICD-10-CM

## 2022-10-17 DIAGNOSIS — Z96.661 STATUS POST RIGHT ANKLE JOINT REPLACEMENT: ICD-10-CM

## 2022-10-17 PROBLEM — Z96.669 S/P ANKLE JOINT REPLACEMENT: Status: ACTIVE | Noted: 2022-10-17

## 2022-10-17 LAB
FASTING STATUS PATIENT QL REPORTED: YES
GLUCOSE BLD-MCNC: 197 MG/DL (ref 70–99)
GLUCOSE BLDC GLUCOMTR-MCNC: 152 MG/DL (ref 70–99)
POTASSIUM BLD-SCNC: 3.8 MMOL/L (ref 3.4–5.3)

## 2022-10-17 PROCEDURE — 999N000179 XR SURGERY CARM FLUORO LESS THAN 5 MIN W STILLS

## 2022-10-17 PROCEDURE — 271N000001 HC OR GENERAL SUPPLY NON-STERILE: Performed by: ORTHOPAEDIC SURGERY

## 2022-10-17 PROCEDURE — 82947 ASSAY GLUCOSE BLOOD QUANT: CPT | Performed by: ANESTHESIOLOGY

## 2022-10-17 PROCEDURE — C1776 JOINT DEVICE (IMPLANTABLE): HCPCS | Performed by: ORTHOPAEDIC SURGERY

## 2022-10-17 PROCEDURE — 258N000003 HC RX IP 258 OP 636: Performed by: PHYSICIAN ASSISTANT

## 2022-10-17 PROCEDURE — 120N000001 HC R&B MED SURG/OB

## 2022-10-17 PROCEDURE — 250N000011 HC RX IP 250 OP 636: Performed by: PHYSICIAN ASSISTANT

## 2022-10-17 PROCEDURE — 999N000141 HC STATISTIC PRE-PROCEDURE NURSING ASSESSMENT: Performed by: ORTHOPAEDIC SURGERY

## 2022-10-17 PROCEDURE — 258N000003 HC RX IP 258 OP 636: Performed by: ANESTHESIOLOGY

## 2022-10-17 PROCEDURE — 250N000009 HC RX 250: Performed by: NURSE ANESTHETIST, CERTIFIED REGISTERED

## 2022-10-17 PROCEDURE — 360N000078 HC SURGERY LEVEL 5, PER MIN: Performed by: ORTHOPAEDIC SURGERY

## 2022-10-17 PROCEDURE — 36415 COLL VENOUS BLD VENIPUNCTURE: CPT | Performed by: ANESTHESIOLOGY

## 2022-10-17 PROCEDURE — G0378 HOSPITAL OBSERVATION PER HR: HCPCS

## 2022-10-17 PROCEDURE — 370N000017 HC ANESTHESIA TECHNICAL FEE, PER MIN: Performed by: ORTHOPAEDIC SURGERY

## 2022-10-17 PROCEDURE — 250N000013 HC RX MED GY IP 250 OP 250 PS 637: Performed by: PHYSICIAN ASSISTANT

## 2022-10-17 PROCEDURE — 250N000009 HC RX 250: Performed by: ANESTHESIOLOGY

## 2022-10-17 PROCEDURE — 710N000009 HC RECOVERY PHASE 1, LEVEL 1, PER MIN: Performed by: ORTHOPAEDIC SURGERY

## 2022-10-17 PROCEDURE — 82962 GLUCOSE BLOOD TEST: CPT

## 2022-10-17 PROCEDURE — 272N000001 HC OR GENERAL SUPPLY STERILE: Performed by: ORTHOPAEDIC SURGERY

## 2022-10-17 PROCEDURE — 250N000011 HC RX IP 250 OP 636: Performed by: NURSE ANESTHETIST, CERTIFIED REGISTERED

## 2022-10-17 PROCEDURE — 250N000009 HC RX 250: Performed by: ORTHOPAEDIC SURGERY

## 2022-10-17 PROCEDURE — 250N000011 HC RX IP 250 OP 636: Performed by: ANESTHESIOLOGY

## 2022-10-17 PROCEDURE — 84132 ASSAY OF SERUM POTASSIUM: CPT | Performed by: ANESTHESIOLOGY

## 2022-10-17 DEVICE — INSERT, SIZE 1, RIGHT, TH8
Type: IMPLANTABLE DEVICE | Site: ANKLE | Status: FUNCTIONAL
Brand: SALTO TALARIS®

## 2022-10-17 DEVICE — TALAR DOME, FLAT CUT, SIZE 1, RIGHT
Type: IMPLANTABLE DEVICE | Site: ANKLE | Status: FUNCTIONAL
Brand: SALTO TALARIS®

## 2022-10-17 DEVICE — TIBIAL TRAY, XL, SIZE 1
Type: IMPLANTABLE DEVICE | Site: ANKLE | Status: FUNCTIONAL
Brand: SALTO TALARIS®

## 2022-10-17 RX ORDER — SODIUM CHLORIDE, SODIUM LACTATE, POTASSIUM CHLORIDE, CALCIUM CHLORIDE 600; 310; 30; 20 MG/100ML; MG/100ML; MG/100ML; MG/100ML
INJECTION, SOLUTION INTRAVENOUS CONTINUOUS
Status: DISCONTINUED | OUTPATIENT
Start: 2022-10-17 | End: 2022-10-17 | Stop reason: HOSPADM

## 2022-10-17 RX ORDER — ONDANSETRON 2 MG/ML
4 INJECTION INTRAMUSCULAR; INTRAVENOUS EVERY 6 HOURS PRN
Status: DISCONTINUED | OUTPATIENT
Start: 2022-10-17 | End: 2022-10-18 | Stop reason: HOSPADM

## 2022-10-17 RX ORDER — ONDANSETRON 2 MG/ML
INJECTION INTRAMUSCULAR; INTRAVENOUS PRN
Status: DISCONTINUED | OUTPATIENT
Start: 2022-10-17 | End: 2022-10-17

## 2022-10-17 RX ORDER — ACETAMINOPHEN 325 MG/1
975 TABLET ORAL EVERY 8 HOURS
Status: DISCONTINUED | OUTPATIENT
Start: 2022-10-17 | End: 2022-10-18 | Stop reason: HOSPADM

## 2022-10-17 RX ORDER — OXYCODONE HYDROCHLORIDE 5 MG/1
5 TABLET ORAL EVERY 4 HOURS PRN
Status: DISCONTINUED | OUTPATIENT
Start: 2022-10-17 | End: 2022-10-18 | Stop reason: HOSPADM

## 2022-10-17 RX ORDER — PROPOFOL 10 MG/ML
INJECTION, EMULSION INTRAVENOUS PRN
Status: DISCONTINUED | OUTPATIENT
Start: 2022-10-17 | End: 2022-10-17

## 2022-10-17 RX ORDER — NALOXONE HYDROCHLORIDE 0.4 MG/ML
0.4 INJECTION, SOLUTION INTRAMUSCULAR; INTRAVENOUS; SUBCUTANEOUS
Status: DISCONTINUED | OUTPATIENT
Start: 2022-10-17 | End: 2022-10-18 | Stop reason: HOSPADM

## 2022-10-17 RX ORDER — FENTANYL CITRATE 0.05 MG/ML
25 INJECTION, SOLUTION INTRAMUSCULAR; INTRAVENOUS EVERY 5 MIN PRN
Status: DISCONTINUED | OUTPATIENT
Start: 2022-10-17 | End: 2022-10-17 | Stop reason: HOSPADM

## 2022-10-17 RX ORDER — OXYCODONE HYDROCHLORIDE 5 MG/1
10 TABLET ORAL EVERY 4 HOURS PRN
Status: DISCONTINUED | OUTPATIENT
Start: 2022-10-17 | End: 2022-10-18 | Stop reason: HOSPADM

## 2022-10-17 RX ORDER — VITAMIN B COMPLEX
25 TABLET ORAL DAILY
Status: DISCONTINUED | OUTPATIENT
Start: 2022-10-17 | End: 2022-10-18 | Stop reason: HOSPADM

## 2022-10-17 RX ORDER — CEFAZOLIN SODIUM/WATER 2 G/20 ML
2 SYRINGE (ML) INTRAVENOUS
Status: DISCONTINUED | OUTPATIENT
Start: 2022-10-17 | End: 2022-10-17 | Stop reason: HOSPADM

## 2022-10-17 RX ORDER — SODIUM CHLORIDE, SODIUM LACTATE, POTASSIUM CHLORIDE, CALCIUM CHLORIDE 600; 310; 30; 20 MG/100ML; MG/100ML; MG/100ML; MG/100ML
INJECTION, SOLUTION INTRAVENOUS CONTINUOUS
Status: DISCONTINUED | OUTPATIENT
Start: 2022-10-17 | End: 2022-10-18 | Stop reason: HOSPADM

## 2022-10-17 RX ORDER — ACETAMINOPHEN 325 MG/1
650 TABLET ORAL EVERY 4 HOURS PRN
Status: DISCONTINUED | OUTPATIENT
Start: 2022-10-20 | End: 2022-10-18 | Stop reason: HOSPADM

## 2022-10-17 RX ORDER — OXYCODONE HYDROCHLORIDE 5 MG/1
5 TABLET ORAL EVERY 4 HOURS PRN
Status: DISCONTINUED | OUTPATIENT
Start: 2022-10-17 | End: 2022-10-17 | Stop reason: HOSPADM

## 2022-10-17 RX ORDER — AMOXICILLIN 250 MG
1 CAPSULE ORAL 2 TIMES DAILY
Status: DISCONTINUED | OUTPATIENT
Start: 2022-10-17 | End: 2022-10-18 | Stop reason: HOSPADM

## 2022-10-17 RX ORDER — HYDROMORPHONE HCL IN WATER/PF 6 MG/30 ML
0.2 PATIENT CONTROLLED ANALGESIA SYRINGE INTRAVENOUS EVERY 5 MIN PRN
Status: DISCONTINUED | OUTPATIENT
Start: 2022-10-17 | End: 2022-10-17 | Stop reason: HOSPADM

## 2022-10-17 RX ORDER — FENTANYL CITRATE 50 UG/ML
INJECTION, SOLUTION INTRAMUSCULAR; INTRAVENOUS PRN
Status: DISCONTINUED | OUTPATIENT
Start: 2022-10-17 | End: 2022-10-17

## 2022-10-17 RX ORDER — HYDROXYZINE HYDROCHLORIDE 25 MG/1
25 TABLET, FILM COATED ORAL EVERY 6 HOURS PRN
Status: DISCONTINUED | OUTPATIENT
Start: 2022-10-17 | End: 2022-10-18 | Stop reason: HOSPADM

## 2022-10-17 RX ORDER — LIDOCAINE 40 MG/G
CREAM TOPICAL
Status: DISCONTINUED | OUTPATIENT
Start: 2022-10-17 | End: 2022-10-17 | Stop reason: HOSPADM

## 2022-10-17 RX ORDER — POLYETHYLENE GLYCOL 3350 17 G/17G
17 POWDER, FOR SOLUTION ORAL DAILY
Status: DISCONTINUED | OUTPATIENT
Start: 2022-10-18 | End: 2022-10-18 | Stop reason: HOSPADM

## 2022-10-17 RX ORDER — MAGNESIUM HYDROXIDE 1200 MG/15ML
LIQUID ORAL PRN
Status: DISCONTINUED | OUTPATIENT
Start: 2022-10-17 | End: 2022-10-17 | Stop reason: HOSPADM

## 2022-10-17 RX ORDER — HYDROMORPHONE HCL IN WATER/PF 6 MG/30 ML
0.2 PATIENT CONTROLLED ANALGESIA SYRINGE INTRAVENOUS
Status: DISCONTINUED | OUTPATIENT
Start: 2022-10-17 | End: 2022-10-18 | Stop reason: HOSPADM

## 2022-10-17 RX ORDER — HYDROMORPHONE HCL IN WATER/PF 6 MG/30 ML
0.4 PATIENT CONTROLLED ANALGESIA SYRINGE INTRAVENOUS
Status: DISCONTINUED | OUTPATIENT
Start: 2022-10-17 | End: 2022-10-18 | Stop reason: HOSPADM

## 2022-10-17 RX ORDER — BISACODYL 10 MG
10 SUPPOSITORY, RECTAL RECTAL DAILY PRN
Status: DISCONTINUED | OUTPATIENT
Start: 2022-10-17 | End: 2022-10-18 | Stop reason: HOSPADM

## 2022-10-17 RX ORDER — NALOXONE HYDROCHLORIDE 0.4 MG/ML
0.2 INJECTION, SOLUTION INTRAMUSCULAR; INTRAVENOUS; SUBCUTANEOUS
Status: DISCONTINUED | OUTPATIENT
Start: 2022-10-17 | End: 2022-10-18 | Stop reason: HOSPADM

## 2022-10-17 RX ORDER — ATORVASTATIN CALCIUM 10 MG/1
10 TABLET, FILM COATED ORAL DAILY
Status: DISCONTINUED | OUTPATIENT
Start: 2022-10-17 | End: 2022-10-18 | Stop reason: HOSPADM

## 2022-10-17 RX ORDER — LIDOCAINE 40 MG/G
CREAM TOPICAL
Status: DISCONTINUED | OUTPATIENT
Start: 2022-10-17 | End: 2022-10-18 | Stop reason: HOSPADM

## 2022-10-17 RX ORDER — DIAZEPAM 5 MG
5 TABLET ORAL EVERY 6 HOURS PRN
Status: DISCONTINUED | OUTPATIENT
Start: 2022-10-17 | End: 2022-10-18 | Stop reason: HOSPADM

## 2022-10-17 RX ORDER — ONDANSETRON 4 MG/1
4 TABLET, ORALLY DISINTEGRATING ORAL EVERY 6 HOURS PRN
Status: DISCONTINUED | OUTPATIENT
Start: 2022-10-17 | End: 2022-10-18 | Stop reason: HOSPADM

## 2022-10-17 RX ORDER — CALCIUM CARBONATE 500 MG/1
1000 TABLET, CHEWABLE ORAL DAILY PRN
Status: DISCONTINUED | OUTPATIENT
Start: 2022-10-17 | End: 2022-10-18 | Stop reason: HOSPADM

## 2022-10-17 RX ORDER — CEFAZOLIN SODIUM 1 G/3ML
1 INJECTION, POWDER, FOR SOLUTION INTRAMUSCULAR; INTRAVENOUS EVERY 8 HOURS
Status: COMPLETED | OUTPATIENT
Start: 2022-10-17 | End: 2022-10-18

## 2022-10-17 RX ORDER — KETOROLAC TROMETHAMINE 15 MG/ML
15 INJECTION, SOLUTION INTRAMUSCULAR; INTRAVENOUS EVERY 6 HOURS PRN
Status: DISCONTINUED | OUTPATIENT
Start: 2022-10-17 | End: 2022-10-18 | Stop reason: HOSPADM

## 2022-10-17 RX ORDER — FENTANYL CITRATE 0.05 MG/ML
50 INJECTION, SOLUTION INTRAMUSCULAR; INTRAVENOUS
Status: DISCONTINUED | OUTPATIENT
Start: 2022-10-17 | End: 2022-10-17 | Stop reason: HOSPADM

## 2022-10-17 RX ORDER — CEFAZOLIN SODIUM/WATER 2 G/20 ML
2 SYRINGE (ML) INTRAVENOUS SEE ADMIN INSTRUCTIONS
Status: DISCONTINUED | OUTPATIENT
Start: 2022-10-17 | End: 2022-10-17 | Stop reason: HOSPADM

## 2022-10-17 RX ORDER — VENLAFAXINE HYDROCHLORIDE 37.5 MG/1
37.5 CAPSULE, EXTENDED RELEASE ORAL DAILY
Status: DISCONTINUED | OUTPATIENT
Start: 2022-10-18 | End: 2022-10-18 | Stop reason: HOSPADM

## 2022-10-17 RX ORDER — ASPIRIN 81 MG/1
81 TABLET ORAL 2 TIMES DAILY
Status: DISCONTINUED | OUTPATIENT
Start: 2022-10-17 | End: 2022-10-18 | Stop reason: HOSPADM

## 2022-10-17 RX ORDER — PROCHLORPERAZINE MALEATE 10 MG
10 TABLET ORAL EVERY 6 HOURS PRN
Status: DISCONTINUED | OUTPATIENT
Start: 2022-10-17 | End: 2022-10-18 | Stop reason: HOSPADM

## 2022-10-17 RX ORDER — PROPOFOL 10 MG/ML
INJECTION, EMULSION INTRAVENOUS CONTINUOUS PRN
Status: DISCONTINUED | OUTPATIENT
Start: 2022-10-17 | End: 2022-10-17

## 2022-10-17 RX ORDER — ONDANSETRON 2 MG/ML
4 INJECTION INTRAMUSCULAR; INTRAVENOUS EVERY 30 MIN PRN
Status: DISCONTINUED | OUTPATIENT
Start: 2022-10-17 | End: 2022-10-17 | Stop reason: HOSPADM

## 2022-10-17 RX ORDER — ONDANSETRON 4 MG/1
4 TABLET, ORALLY DISINTEGRATING ORAL EVERY 30 MIN PRN
Status: DISCONTINUED | OUTPATIENT
Start: 2022-10-17 | End: 2022-10-17 | Stop reason: HOSPADM

## 2022-10-17 RX ORDER — LIDOCAINE HYDROCHLORIDE 20 MG/ML
INJECTION, SOLUTION INFILTRATION; PERINEURAL PRN
Status: DISCONTINUED | OUTPATIENT
Start: 2022-10-17 | End: 2022-10-17

## 2022-10-17 RX ADMIN — PROPOFOL 175 MCG/KG/MIN: 10 INJECTION, EMULSION INTRAVENOUS at 09:38

## 2022-10-17 RX ADMIN — CEFAZOLIN 1 G: 1 INJECTION, POWDER, FOR SOLUTION INTRAMUSCULAR; INTRAVENOUS at 16:56

## 2022-10-17 RX ADMIN — FENTANYL CITRATE 25 MCG: 50 INJECTION, SOLUTION INTRAMUSCULAR; INTRAVENOUS at 09:45

## 2022-10-17 RX ADMIN — METFORMIN HYDROCHLORIDE 1000 MG: 500 TABLET, FILM COATED ORAL at 17:02

## 2022-10-17 RX ADMIN — ATORVASTATIN CALCIUM 10 MG: 10 TABLET, FILM COATED ORAL at 21:35

## 2022-10-17 RX ADMIN — LIDOCAINE HYDROCHLORIDE 100 MG: 20 INJECTION, SOLUTION INFILTRATION; PERINEURAL at 09:38

## 2022-10-17 RX ADMIN — Medication 2 G: at 09:27

## 2022-10-17 RX ADMIN — ACETAMINOPHEN 975 MG: 325 TABLET, FILM COATED ORAL at 16:56

## 2022-10-17 RX ADMIN — ONDANSETRON 4 MG: 2 INJECTION INTRAMUSCULAR; INTRAVENOUS at 10:17

## 2022-10-17 RX ADMIN — MIDAZOLAM HYDROCHLORIDE 2 MG: 1 INJECTION, SOLUTION INTRAMUSCULAR; INTRAVENOUS at 08:43

## 2022-10-17 RX ADMIN — ACETAMINOPHEN 975 MG: 325 TABLET, FILM COATED ORAL at 23:21

## 2022-10-17 RX ADMIN — ASPIRIN 81 MG: 81 TABLET, COATED ORAL at 21:35

## 2022-10-17 RX ADMIN — SODIUM CHLORIDE, POTASSIUM CHLORIDE, SODIUM LACTATE AND CALCIUM CHLORIDE: 600; 310; 30; 20 INJECTION, SOLUTION INTRAVENOUS at 17:11

## 2022-10-17 RX ADMIN — SODIUM CHLORIDE, POTASSIUM CHLORIDE, SODIUM LACTATE AND CALCIUM CHLORIDE: 600; 310; 30; 20 INJECTION, SOLUTION INTRAVENOUS at 08:44

## 2022-10-17 RX ADMIN — BUPIVACAINE HYDROCHLORIDE 10 ML: 5 INJECTION, SOLUTION EPIDURAL; INTRACAUDAL at 08:35

## 2022-10-17 RX ADMIN — Medication 25 MCG: at 21:35

## 2022-10-17 RX ADMIN — FENTANYL CITRATE 50 MCG: 50 INJECTION, SOLUTION INTRAMUSCULAR; INTRAVENOUS at 09:38

## 2022-10-17 RX ADMIN — PROPOFOL 180 MG: 10 INJECTION, EMULSION INTRAVENOUS at 09:38

## 2022-10-17 ASSESSMENT — ACTIVITIES OF DAILY LIVING (ADL)
ADLS_ACUITY_SCORE: 20
ADLS_ACUITY_SCORE: 20
ADLS_ACUITY_SCORE: 23
ADLS_ACUITY_SCORE: 20
ADLS_ACUITY_SCORE: 23
ADLS_ACUITY_SCORE: 20

## 2022-10-17 ASSESSMENT — LIFESTYLE VARIABLES: TOBACCO_USE: 0

## 2022-10-17 NOTE — ANESTHESIA PROCEDURE NOTES
Adductor canal and Femoral Procedure Note    Pre-Procedure   Staff -        Anesthesiologist:  Denver Azar MD       Performed By: anesthesiologist       Location: pre-op       Pre-Anesthestic Checklist: patient identified, IV checked, site marked, risks and benefits discussed, informed consent, monitors and equipment checked, pre-op evaluation, at physician/surgeon's request and post-op pain management  Timeout:       Correct Patient: Yes        Correct Procedure: Yes        Correct Site: Yes        Correct Position: Yes        Correct Laterality: Yes        Site Marked: Yes  Procedure Documentation  Procedure: Adductor canal, Femoral       Laterality: right       Patient Position: supine       Patient Prep/Sterile Barriers: sterile gloves, mask, patient draped       Skin prep: Chloraprep       Local skin infiltrated with 2 mL of 1% lidocaine.        Needle Type: insulated       Ultrasound guided       1. Ultrasound was used to identify targeted nerve, plexus, vascular marker, or fascial plane and place a needle adjacent to it in real-time.       2. Ultrasound was used to visualize the spread of anesthetic in close proximity to the above referenced structure.       3. A permanent image is entered into the patient's record.       4. The visualized anatomic structures appeared normal.       5. There were no apparent abnormal pathologic findings.    Assessment/Narrative         The placement was negative for: blood aspirated, painful injection and site bleeding       Paresthesias: No.       Bolus given via needle..        Secured via.        Insertion/Infusion Method: Single Shot       Complications: none       Injection made incrementally with aspirations every 5 mL.    Medication(s) Administered   Bupivacaine 0.5% w/ 1:400K Epi (Injection) - Injection   10 mL - 10/17/2022 8:35:00 AM   Comments:  Patient tolerated the procedure well.    The surgeon has given a verbal order transferring care of this patient to  Take glimpeiride 2mg qwith dinner  Take actos or pioglitazone 30mg daily  metformin 1000mg twice a day  Steglatro 15mg daily   "me for the performance of a regional analgesia block for post-op pain control. It is requested of me because I am uniquely trained and qualified to perform this block and the surgeon is neither trained nor qualified to perform this procedure.Ultrasound Interpretation, peripheral nerve block.        FOR [facility] ([facility]) ONLY:   Pain Team Contact information: please page the Pain Team Via [employer]. Search \"Pain\". During daytime hours, please page the attending first. At night please page the resident first.    "

## 2022-10-17 NOTE — ANESTHESIA POSTPROCEDURE EVALUATION
Patient: Teresa Hahn    Procedure: Procedure(s):  RIGHT ANKLE FUSION CONVERSION TO A TOTAL ANKLE ARTHROPLASTY       Anesthesia Type:  General    Note:  Disposition: Inpatient   Postop Pain Control: Uneventful            Sign Out: Well controlled pain   PONV: No   Neuro/Psych: Uneventful            Sign Out: Acceptable/Baseline neuro status   Airway/Respiratory: Uneventful            Sign Out: Acceptable/Baseline resp. status   CV/Hemodynamics: Uneventful            Sign Out: Acceptable CV status   Other NRE: NONE   DID A NON-ROUTINE EVENT OCCUR? No           Last vitals:  Vitals Value Taken Time   /74 10/17/22 1400   Temp 36.1  C (97  F) 10/17/22 1400   Pulse 90 10/17/22 1425   Resp 15 10/17/22 1425   SpO2 94 % 10/17/22 1425   Vitals shown include unvalidated device data.    Electronically Signed By: Denver Azar MD  October 17, 2022  3:44 PM

## 2022-10-17 NOTE — ANESTHESIA PROCEDURE NOTES
Popliteal and Sciatic Procedure Note    Pre-Procedure   Staff -        Anesthesiologist:  Denver Azar MD       Performed By: anesthesiologist       Location: pre-op       Pre-Anesthestic Checklist: patient identified, IV checked, site marked, risks and benefits discussed, informed consent, monitors and equipment checked, pre-op evaluation, at physician/surgeon's request and post-op pain management  Timeout:       Correct Patient: Yes        Correct Procedure: Yes        Correct Site: Yes        Correct Position: Yes        Correct Laterality: Yes        Site Marked: Yes  Procedure Documentation  Procedure: Popliteal, Sciatic       Laterality: right       Patient Position: supine       Patient Prep/Sterile Barriers: sterile gloves, mask, patient draped       Skin prep: Chloraprep       Local skin infiltrated with 4 mL of 1% lidocaine.        Needle Type: insulated       Ultrasound guided       1. Ultrasound was used to identify targeted nerve, plexus, vascular marker, or fascial plane and place a needle adjacent to it in real-time.       2. Ultrasound was used to visualize the spread of anesthetic in close proximity to the above referenced structure.       3. A permanent image is entered into the patient's record.    Assessment/Narrative         The placement was negative for: blood aspirated, painful injection and site bleeding       Paresthesias: No.       Bolus given via needle..        Secured via.        Insertion/Infusion Method: Single Shot       Complications: none       Injection made incrementally with aspirations every 5 mL.    Medication(s) Administered   Bupivacaine 0.5% w/ 1:400K Epi (Injection) - Injection   20 mL - 10/17/2022 8:35:00 AM   Comments:  Sciatic nerve block via popliteal approach    Patient tolerated the procedure well.    1.  Under ultrasound guidance, the needle was inserted and placed in close proximity to the target nerve(s).  2. Ultrasound was also used to visualize the spread  "of the anesthetic in close proximity to the nerve(s) being blocked.  Local anesthetic was given in incremental doses with intermittent negative aspiration.    3. The nerve(s) appeared anatomically normal.    4. There were no apparent abnormal pathological findings.    5. A permanent ultrasound image was saved in the patient's record.      The surgeon has given a verbal order transferring care of this patient to me for the performance of a regional analgesia block for post-op pain control. It is requested of me because I am uniquely trained and qualified to perform this block and the surgeon is neither trained nor qualified to perform this procedure.Ultrasound Interpretation, peripheral nerve block.        FOR [facility] ([facility]) ONLY:   Pain Team Contact information: please page the Pain Team Via [employer]. Search \"Pain\". During daytime hours, please page the attending first. At night please page the resident first.    "

## 2022-10-17 NOTE — ANESTHESIA PROCEDURE NOTES
Airway       Patient location: [facility] - Operating Room or Procedural Area.  Staff -        Anesthesiologist:  Denver Azar MD       CRNA: Madison Cardenas APRN CRNA       Performed By: CRNA  Consent for Airway        Urgency: elective  Indications and Patient Condition       Indications for airway management: purnima-procedural       Induction type:intravenous       Mask difficulty assessment: 0 - not attempted    Final Airway Details       Final airway type: supraglottic airway    Supraglottic Airway Details        Type: LMA       Brand: I-Gel       LMA size: 4    Post intubation assessment        Placement verified by: capnometry, equal breath sounds and chest rise        Number of attempts at approach: 1       Number of other approaches attempted: 0       Secured with: commercial tube perdue       Ease of procedure: easy       Dentition: Intact and Unchanged

## 2022-10-17 NOTE — INTERVAL H&P NOTE
"I have reviewed the surgical (or preoperative) H&P that is linked to this encounter, and examined the patient. There are no significant changes    Clinical Conditions Present on Arrival:  Clinically Significant Risk Factors Present on Admission                   # DMII: A1C = N/A within past 3 months  # Overweight: Estimated body mass index is 29.12 kg/m  as calculated from the following:    Height as of this encounter: 1.575 m (5' 2\").    Weight as of 9/30/22: 72.2 kg (159 lb 3.2 oz).       "

## 2022-10-17 NOTE — ANESTHESIA CARE TRANSFER NOTE
Patient: Teresa Hahn    Procedure: Procedure(s):  RIGHT ANKLE FUSION CONVERSION TO A TOTAL ANKLE ARTHROPLASTY       Diagnosis: Pain in joint involving right ankle and foot [M25.571]  Right ankle pain [M25.571]  Arthrodesis status [Z98.1]  Diagnosis Additional Information: No value filed.    Anesthesia Type:   General     Note:    Oropharynx: spontaneously breathing  Level of Consciousness: awake  Oxygen Supplementation: nasal cannula  Level of Supplemental Oxygen (L/min / FiO2): 3  Independent Airway: airway patency satisfactory and stable  Dentition: dentition unchanged  Vital Signs Stable: post-procedure vital signs reviewed and stable  Report to RN Given: handoff report given  Patient transferred to: PACU  Comments: At end of procedure, spontaneous respirations, adequate tidal volumes, followed commands to voice, oropharynx suctioned, airway patent after LMA removal. Oxygen via nasal cannula at 4 liters per minute to PACU. Oxygen tubing connected to wall O2 in PACU, SpO2, NiBP, and EKG monitors and alarms on and functioning, report on patient's clinical status given to PACU RN, RN questions answered.  Handoff Report: Identifed the Patient, Identified the Reponsible Provider, Reviewed the pertinent medical history, Discussed the surgical course, Reviewed Intra-OP anesthesia mangement and issues during anesthesia, Set expectations for post-procedure period and Allowed opportunity for questions and acknowledgement of understanding      Vitals:  Vitals Value Taken Time   /78 10/17/22 1034   Temp     Pulse 100 10/17/22 1036   Resp 14 10/17/22 1036   SpO2 94 % 10/17/22 1036   Vitals shown include unvalidated device data.    Electronically Signed By: CARL Gabriel CRNA  October 17, 2022  10:37 AM

## 2022-10-17 NOTE — ANESTHESIA PREPROCEDURE EVALUATION
Anesthesia Pre-Procedure Evaluation    Patient: Teresa Hahn   MRN: [id number] : 1960        Procedure : Procedure(s):  RIGHT ANKLE FUSION CONVERSION TO A TOTAL ANKLE ARTHROPLASTY          Past Medical History:   Diagnosis Date     arthritis     from right ankle fracture.     Diabetes (H)      Malignant neoplasm of right female breast (H) 2017     PONV (postoperative nausea and vomiting)      TBI (traumatic brain injury) (H) 2012    age 2. coma x 3 weeks.       Past Surgical History:   Procedure Laterality Date     ABDOMEN SURGERY       x 4      ENT SURGERY      Tonsillectomy     MAMMOPLASTY REDUCTION Left 2017    Procedure: MAMMOPLASTY REDUCTION;  LEFT BREAST REDUCTION MAMMOPLASTY ; RIGHT BREAST REVISION TRAM FLAP ;  Surgeon: Charan Lane MD;  Location: [facility]     MASTECTOMY, RECONSTRUCT BREAST, COMBINED Right 2017     REVISION TRAM FLAP Right 2017    Procedure: REVISION TRAM FLAP;;  Surgeon: Charan Lane MD;  Location: [facility]     SURGICAL HISTORY OF -   2004    r leg broken in several areas; now arthritis. 3 surgeries     SURGICAL HISTORY OF -       right ankle fusion     SURGICAL HISTORY OF -  Left 2018    incisional hernia repair and small bowel resection (incarcerated)      Allergies   Allergen Reactions     Nickel Rash      Social History     Tobacco Use     Smoking status: Never     Smokeless tobacco: Never   Substance Use Topics     Alcohol use: Yes     Comment: 1/month       Wt Readings from Last 1 Encounters:   22 72.2 kg (159 lb 3.2 oz)        Anesthesia Evaluation   Pt has had prior anesthetic.     History of anesthetic complications  - PONV.      ROS/MED HX  ENT/Pulmonary:    (-) tobacco use and sleep apnea   Neurologic:       Cardiovascular:     (+) Dyslipidemia hypertension-----    METS/Exercise Tolerance:     Hematologic:       Musculoskeletal:   (+) arthritis,     GI/Hepatic:    (-) GERD   Renal/Genitourinary:       Endo:      (+) type II DM,     Psychiatric/Substance Use:     (+) psychiatric history anxiety and other (comment)     Infectious Disease:       Malignancy:   (+) Malignancy, History of Breast.    Other:            Physical Exam    Airway        Mallampati: II   TM distance: > 3 FB   Neck ROM: full   Mouth opening: > 3 cm    Respiratory Devices and Support         Dental  no notable dental history         Cardiovascular   cardiovascular exam normal          Pulmonary   pulmonary exam normal                OUTSIDE LABS:  CBC:   Lab Results   Component Value Date    WBC 8.7 11/18/2021    WBC 8.5 03/07/2017    HGB 15.3 11/18/2021    HGB 14.1 12/18/2017    HCT 46.0 11/18/2021    HCT 44.3 03/07/2017     11/18/2021     03/07/2017     BMP:   Lab Results   Component Value Date     05/26/2022     11/18/2021    POTASSIUM 4.7 05/26/2022    POTASSIUM 4.1 11/18/2021    CHLORIDE 103 05/26/2022    CHLORIDE 103 11/18/2021    CO2 27 05/26/2022    CO2 27 11/18/2021    BUN 10 05/26/2022    BUN 8 11/18/2021    CR 0.60 05/26/2022    CR 0.65 11/18/2021     (H) 05/26/2022     (H) 11/18/2021     COAGS: No results found for: PTT, INR, FIBR  POC: No results found for: BGM, HCG, HCGS  HEPATIC:   Lab Results   Component Value Date    ALBUMIN 4.2 05/26/2022    PROTTOTAL 7.4 05/26/2022    ALT 61 (H) 05/26/2022    AST 20 05/26/2022    ALKPHOS 94 05/26/2022    BILITOTAL 0.6 05/26/2022     OTHER:   Lab Results   Component Value Date    A1C 8.2 (H) 09/30/2022    SAUD 9.5 05/26/2022    TSH 1.50 05/26/2022       Anesthesia Plan    ASA Status:  2   NPO Status:  NPO Appropriate    Anesthesia Type: General.     - Airway: LMA   Induction: Intravenous, Propofol.   Maintenance: TIVA.        Consents    Anesthesia Plan(s) and associated risks, benefits, and realistic alternatives discussed. Questions answered and patient/representative(s) expressed understanding.    - Discussed:     - Discussed with:  Patient          Postoperative Care    Pain management: Multi-modal analgesia.   PONV prophylaxis: Ondansetron (or other 5HT-3)     Comments:                Denver Azar MD

## 2022-10-17 NOTE — OP NOTE
Procedure Date: 10/17/2022    PREOPERATIVE DIAGNOSIS:  Painful right ankle fusion.    POSTOPERATIVE DIAGNOSIS:  Painful right ankle fusion.    SURGICAL PROCEDURES:     1.  Conversion of her right ankle fusion to a replacement using a Avi Talaris size 1 long base tibia, size 1 XT flat cut talus, and an 8 mm polyethylene spacer.  2.  Removal of screws from the previous ankle fusion.    ANESTHETIC:  General.    SURGEON:  Shahab Bagley MD    ASSISTANT:  Colton Coyle PA-C    PREAMBLE:  Ms. Hahn had a previous ankle replacement done, which did well for a number of years.  Over time, she developed subtalar and talonavicular arthritis with progressive pain.  We decided instead of a pantalar fusion to convert to a replacement.    Informed consent was obtained.    NEED FOR AN ASSISTANT:  A skilled first assistant was necessary through all portions of the case in order to assist with positioning, retracting, wound closure, dressing and splint application.    DESCRIPTION OF PROCEDURE:  After adequate induction of a general anesthetic, the patient was positioned supine on the operating table.  The right leg was sterilely prepped and free-draped in the usual fashion.  Tourniquet around the thigh was inflated to 300 mmHg.    The previous medial incision was used over the tibia.  There were 2 screws and a washer that were removed without much difficulty.  This was followed by a 10 cm midline incision anterior over the ankle.  The interval between the extensor hallucis longus and tibialis anterior was used to expose the joint.  Under fluoroscopic guidance, the tibial cut was marked about 9 mm proximal to the previous joint line.  Same was done for the talar cut to about 3 mm plantar to the previous joint line.  A bone block was removed.  This was followed by opening the gutters under fluoroscopic guidance with a reciprocating saw.  This allowed free motion of the foot.    The ankle measured to a size 1 long base tibia and a  size 1 flat cut talus, and there was excellent stability and range of motion with an 8 mm polyethylene spacer.    The cutting blocks were then used to do the keel cuts for the tibia and talus.  With all the cuts made, there was excellent stability and range of motion.  The components were removed and the ankle was thoroughly rinsed, removing bone debris.  The definitive size 1 long base tibia and talus were then inserted with an 8 mm polyethylene spacer.    The tourniquet was deflated.  Hemostasis obtained.  The wound was closed in layers.  A sterile dressing and a light compressive bandage applied followed by a short leg cast.  She tolerated the procedure well and was taken to the recovery room in satisfactory condition.    She can ambulate partial weightbearing with crutches.  Sutures will be removed in 2 weeks.    Shahab Bagley MD        D: 10/17/2022   T: 10/17/2022   MT: TIMOTHY    Name:     FRANKIE PELAEZ  MRN:      [id number]        Account:        [id number]   :      1960           Procedure Date: 10/17/2022     Document: [id number]

## 2022-10-18 ENCOUNTER — APPOINTMENT (OUTPATIENT)
Dept: PHYSICAL THERAPY | Facility: CLINIC | Age: 62
End: 2022-10-18
Attending: PHYSICIAN ASSISTANT
Payer: COMMERCIAL

## 2022-10-18 ENCOUNTER — APPOINTMENT (OUTPATIENT)
Dept: PHYSICAL THERAPY | Facility: CLINIC | Age: 62
End: 2022-10-18
Attending: ORTHOPAEDIC SURGERY
Payer: COMMERCIAL

## 2022-10-18 VITALS
HEART RATE: 91 BPM | TEMPERATURE: 98.5 F | DIASTOLIC BLOOD PRESSURE: 79 MMHG | OXYGEN SATURATION: 93 % | WEIGHT: 155.4 LBS | HEIGHT: 62 IN | BODY MASS INDEX: 28.6 KG/M2 | SYSTOLIC BLOOD PRESSURE: 131 MMHG | RESPIRATION RATE: 18 BRPM

## 2022-10-18 LAB
GLUCOSE BLDC GLUCOMTR-MCNC: 85 MG/DL (ref 70–99)
HGB BLD-MCNC: 12.8 G/DL (ref 11.7–15.7)

## 2022-10-18 PROCEDURE — 250N000013 HC RX MED GY IP 250 OP 250 PS 637: Performed by: PHYSICIAN ASSISTANT

## 2022-10-18 PROCEDURE — 97162 PT EVAL MOD COMPLEX 30 MIN: CPT | Mod: GP | Performed by: PHYSICAL THERAPIST

## 2022-10-18 PROCEDURE — 85018 HEMOGLOBIN: CPT | Performed by: PHYSICIAN ASSISTANT

## 2022-10-18 PROCEDURE — 36415 COLL VENOUS BLD VENIPUNCTURE: CPT | Performed by: PHYSICIAN ASSISTANT

## 2022-10-18 PROCEDURE — 250N000011 HC RX IP 250 OP 636: Performed by: PHYSICIAN ASSISTANT

## 2022-10-18 PROCEDURE — G0378 HOSPITAL OBSERVATION PER HR: HCPCS

## 2022-10-18 PROCEDURE — 97116 GAIT TRAINING THERAPY: CPT | Mod: GP | Performed by: PHYSICAL THERAPIST

## 2022-10-18 PROCEDURE — 97530 THERAPEUTIC ACTIVITIES: CPT | Mod: GP | Performed by: PHYSICAL THERAPIST

## 2022-10-18 RX ORDER — OXYCODONE HYDROCHLORIDE 5 MG/1
5 TABLET ORAL EVERY 4 HOURS PRN
Qty: 40 TABLET | Refills: 0 | Status: SHIPPED | OUTPATIENT
Start: 2022-10-18 | End: 2022-12-01

## 2022-10-18 RX ORDER — ONDANSETRON 4 MG/1
4 TABLET, ORALLY DISINTEGRATING ORAL EVERY 6 HOURS PRN
Qty: 15 TABLET | Refills: 1 | Status: SHIPPED | OUTPATIENT
Start: 2022-10-18 | End: 2022-12-01

## 2022-10-18 RX ORDER — DIAZEPAM 5 MG
5 TABLET ORAL EVERY 6 HOURS PRN
Qty: 15 TABLET | Refills: 0 | Status: SHIPPED | OUTPATIENT
Start: 2022-10-18 | End: 2022-12-01

## 2022-10-18 RX ORDER — HYDROXYZINE HYDROCHLORIDE 25 MG/1
25 TABLET, FILM COATED ORAL EVERY 6 HOURS PRN
Qty: 40 TABLET | Refills: 1 | Status: SHIPPED | OUTPATIENT
Start: 2022-10-18 | End: 2022-12-01

## 2022-10-18 RX ADMIN — SENNOSIDES AND DOCUSATE SODIUM 1 TABLET: 50; 8.6 TABLET ORAL at 08:09

## 2022-10-18 RX ADMIN — CEFAZOLIN 1 G: 1 INJECTION, POWDER, FOR SOLUTION INTRAMUSCULAR; INTRAVENOUS at 01:32

## 2022-10-18 RX ADMIN — HYDROXYZINE HYDROCHLORIDE 25 MG: 25 TABLET, FILM COATED ORAL at 08:09

## 2022-10-18 RX ADMIN — ASPIRIN 81 MG: 81 TABLET, COATED ORAL at 08:09

## 2022-10-18 RX ADMIN — ACETAMINOPHEN 975 MG: 325 TABLET, FILM COATED ORAL at 14:25

## 2022-10-18 RX ADMIN — OXYCODONE HYDROCHLORIDE 10 MG: 5 TABLET ORAL at 14:25

## 2022-10-18 RX ADMIN — OXYCODONE HYDROCHLORIDE 10 MG: 5 TABLET ORAL at 09:59

## 2022-10-18 RX ADMIN — ACETAMINOPHEN 975 MG: 325 TABLET, FILM COATED ORAL at 06:15

## 2022-10-18 RX ADMIN — VENLAFAXINE HYDROCHLORIDE 37.5 MG: 37.5 CAPSULE, EXTENDED RELEASE ORAL at 08:09

## 2022-10-18 RX ADMIN — METFORMIN HYDROCHLORIDE 1000 MG: 500 TABLET, FILM COATED ORAL at 08:09

## 2022-10-18 RX ADMIN — POLYETHYLENE GLYCOL 3350 17 G: 17 POWDER, FOR SOLUTION ORAL at 08:08

## 2022-10-18 RX ADMIN — Medication 25 MCG: at 08:09

## 2022-10-18 ASSESSMENT — ACTIVITIES OF DAILY LIVING (ADL)
ADLS_ACUITY_SCORE: 23

## 2022-10-18 NOTE — PROGRESS NOTES
Orthopedic Surgery  Teresa Hahn  10/18/2022  Admit Date:  10/17/2022  POD 1 Day Post-Op  S/P Procedure(s):  RIGHT ANKLE FUSION CONVERSION TO A TOTAL ANKLE ARTHROPLASTY    Patient is doing well.  Block has worn off, pain controlled.  Tolerating oral intake.  No events overnight.     Alert and orient to person, place, and time.  Vital Sign Ranges  Temperature Temp  Av.2  F (36.8  C)  Min: 97  F (36.1  C)  Max: 98.9  F (37.2  C)   Blood pressure Systolic (24hrs), Av , Min:110 , Max:140        Diastolic (24hrs), Av, Min:65, Max:85      Pulse Pulse  Av.9  Min: 70  Max: 98   Respirations Resp  Av  Min: 11  Max: 22   Pulse oximetry SpO2  Av.6 %  Min: 92 %  Max: 98 %       Splint is intact, some bleeding on ACE on posterior proximal calf. Minimal erythema of the surrounding skin.  Left calf is soft, non-tender.  R lower extremity is NVI.    Labs:  Recent Labs   Lab Test 10/17/22  0807 22  0949 21  1109   POTASSIUM 3.8 4.7 4.1     Recent Labs   Lab Test 10/18/22  0743 21  1109 17  1410   HGB 12.8 15.3 14.1     No results for input(s): INR in the last 27774 hours.  Recent Labs   Lab Test 21  1109 17  1107    170       A/P  1. S/p R ankle fusion conversion to TAA   Continue ASA for DVT prophylaxis.     Mobilize with PT/OT 50%WB.     Continue current pain regiment.   Nurse to re-enforce splint with another ACE before discharge    2. Disposition   Anticipate d/c to home today     Aurelia Sigala PA-C

## 2022-10-18 NOTE — PLAN OF CARE
Physical Therapy Discharge Summary    Reason for therapy discharge:    Discharged to home.    Progress towards therapy goal(s). See goals on Care Plan in [facility] electronic health record for goal details.  Goals met    Therapy recommendation(s):    No further therapy is recommended.    Goal Outcome Evaluation:

## 2022-10-18 NOTE — PLAN OF CARE
Goal Outcome Evaluation:  A/Ox4. VSS. CMS intact. Pain managed with scheduled Tylenol. R ankle dressing with small moist bloody drainage to upper calf area, unchanged. Ambulating A1 gb/walker to [facility], adequate UOP. IV SL. Tolerating regular diet. Discharge home pending for today.

## 2022-10-18 NOTE — PLAN OF CARE
Patient vital signs are at baseline: Yes  Patient able to ambulate as they were prior to admission or with assist devices provided by therapies during their stay:  Yes  Patient MUST void prior to discharge:  Yes  Patient able to tolerate oral intake:  Yes  Pain has adequate pain control using Oral analgesics:  Yes  Does patient have an identified :  Yes  Has goal D/C date and time been discussed with patient:  Yes     Patient A/Ox4, Incision has about 80 percent dry blood on the cast, back of the leg area., new ace wrap applied, about 1330 reported tingling on the right toes but already have before once in a while.  Educated regarding incision care, pain and medication management,and follow up.   12122 Discharge was pending until afternoon per PT assesment, she needs to be seen again one more before going home.     1500 patient discharged at 1550

## 2022-10-18 NOTE — PLAN OF CARE
Goal Outcome Evaluation:                      Patient A&0x4. Up with 1 GB and walker to [facility]. She is not bearing weight on her R foot. PT to assess her in the AM. The dressing on her R foot has a small amount sanguinous drainage noted. R Leg is elevated.  . She is not able to wiggle her toes and her foot remains numb.  ISABELLE assess  DP and PT pulses. Toes are warm and no edema noted. Lungs clear. On RA. Getting IS up to 2000. Denies pain so far as Foot remains numb, She is tolerating a regular diet.  IV running LR at 100 nl. Full code, POD 0. Discharge plan TBD.

## 2022-10-20 NOTE — DISCHARGE SUMMARY
Service Date: 10/18/2022  Discharge Date: 10/18/2022    ADMISSION DIAGNOSIS:  Right ankle fusion takedown conversion to total hip arthroplasty.    DISCHARGE DIAGNOSES:  Right ankle fusion takedown conversion to total hip arthroplasty.    OPERATIVE PROCEDURE:  Right ankle fusion takedown conversion to total hip arthroplasty.    ATTENDING PHYSICIAN:     Shahab Bagley MD    HOSPITAL COURSE:  Ms. Pelaez was admitted to [facility] after undergoing the aforementioned procedure.  [facility].  She tolerated the procedure well and was transferred from the operating room to the postanesthesia care unit and then to the orthopedic floor where she underwent IV as well as p.o. pain management and mobility training, physical therapy.  She had an uneventful hospital course, and was able to discharge home on postoperative day #1.  She will follow up with us at 2 and 6-week intervals.  She will be 50% weightbearing until her 6 week follow up.  She will contact our office if any questions in the interim or concerns.    Shahab Bagley MD    As Dictated by OSVALDO OLMSTEAD        D: 10/20/2022   T: 10/20/2022   MT: LORENZA    Name:     FRANKIE PELAEZ  MRN:      [id number]        Account:      [id number]   :      1960           Service Date: 10/18/2022                                  Discharge Date: 10/18/2022     Document: [id number]

## 2022-11-01 ENCOUNTER — TRANSFERRED RECORDS (OUTPATIENT)
Dept: HEALTH INFORMATION MANAGEMENT | Facility: CLINIC | Age: 62
End: 2022-11-01

## 2022-11-30 ENCOUNTER — MYC MEDICAL ADVICE (OUTPATIENT)
Dept: FAMILY MEDICINE | Facility: CLINIC | Age: 62
End: 2022-11-30

## 2022-12-01 ENCOUNTER — TRANSFERRED RECORDS (OUTPATIENT)
Dept: HEALTH INFORMATION MANAGEMENT | Facility: CLINIC | Age: 62
End: 2022-12-01

## 2022-12-01 ENCOUNTER — MYC MEDICAL ADVICE (OUTPATIENT)
Dept: FAMILY MEDICINE | Facility: CLINIC | Age: 62
End: 2022-12-01

## 2022-12-01 ENCOUNTER — OFFICE VISIT (OUTPATIENT)
Dept: FAMILY MEDICINE | Facility: CLINIC | Age: 62
End: 2022-12-01
Payer: COMMERCIAL

## 2022-12-01 VITALS
WEIGHT: 156.2 LBS | BODY MASS INDEX: 29.49 KG/M2 | OXYGEN SATURATION: 93 % | TEMPERATURE: 97.9 F | HEART RATE: 109 BPM | DIASTOLIC BLOOD PRESSURE: 84 MMHG | HEIGHT: 61 IN | RESPIRATION RATE: 15 BRPM | SYSTOLIC BLOOD PRESSURE: 122 MMHG

## 2022-12-01 DIAGNOSIS — E11.9 TYPE 2 DIABETES MELLITUS WITHOUT COMPLICATION, WITHOUT LONG-TERM CURRENT USE OF INSULIN (H): ICD-10-CM

## 2022-12-01 DIAGNOSIS — H25.9 AGE-RELATED CATARACT OF BOTH EYES, UNSPECIFIED AGE-RELATED CATARACT TYPE: ICD-10-CM

## 2022-12-01 DIAGNOSIS — Z01.818 PREOP GENERAL PHYSICAL EXAM: Primary | ICD-10-CM

## 2022-12-01 PROCEDURE — 99214 OFFICE O/P EST MOD 30 MIN: CPT | Performed by: FAMILY MEDICINE

## 2022-12-01 RX ORDER — METFORMIN HCL 500 MG
1000 TABLET, EXTENDED RELEASE 24 HR ORAL 2 TIMES DAILY WITH MEALS
Qty: 360 TABLET | Refills: 1 | Status: SHIPPED | OUTPATIENT
Start: 2022-12-01 | End: 2023-05-22

## 2022-12-01 ASSESSMENT — PAIN SCALES - GENERAL: PAINLEVEL: MILD PAIN (2)

## 2022-12-01 ASSESSMENT — ANXIETY QUESTIONNAIRES
IF YOU CHECKED OFF ANY PROBLEMS ON THIS QUESTIONNAIRE, HOW DIFFICULT HAVE THESE PROBLEMS MADE IT FOR YOU TO DO YOUR WORK, TAKE CARE OF THINGS AT HOME, OR GET ALONG WITH OTHER PEOPLE: NOT DIFFICULT AT ALL
5. BEING SO RESTLESS THAT IT IS HARD TO SIT STILL: NOT AT ALL
1. FEELING NERVOUS, ANXIOUS, OR ON EDGE: SEVERAL DAYS
GAD7 TOTAL SCORE: 5
6. BECOMING EASILY ANNOYED OR IRRITABLE: NOT AT ALL
2. NOT BEING ABLE TO STOP OR CONTROL WORRYING: SEVERAL DAYS
3. WORRYING TOO MUCH ABOUT DIFFERENT THINGS: SEVERAL DAYS
GAD7 TOTAL SCORE: 5
7. FEELING AFRAID AS IF SOMETHING AWFUL MIGHT HAPPEN: SEVERAL DAYS

## 2022-12-01 ASSESSMENT — PATIENT HEALTH QUESTIONNAIRE - PHQ9
SUM OF ALL RESPONSES TO PHQ QUESTIONS 1-9: 2
5. POOR APPETITE OR OVEREATING: SEVERAL DAYS

## 2022-12-01 NOTE — PROGRESS NOTES
[facility]  [address] [address] [address]  Phone: [phone]  Primary Provider: Celeste Valenzuela Ra  Pre-op Performing Provider: JOSÉ LUIS WINTER      PREOPERATIVE EVALUATION:  Today's date: 12/1/2022    Teresa Hahn is a 62 year old female who presents for a preoperative evaluation.    Surgical Information:  Surgery/Procedure: Cataract surgery  Surgery Location: [facility]   Surgeon: Dr. Betsy Serna  Surgery Date: 12/14/22 12/28/22  Time of Surgery: Unknown  Where patient plans to recover: At home with family  Fax number for surgical facility: [phone]      Type of Anesthesia Anticipated: General    Assessment and Plan    (Z01.818) Preop general physical exam  (primary encounter diagnosis)  Comment: cleared for surgery without further eval  Plan:     (H25.9) Age-related cataract of both eyes, unspecified age-related cataract type  Comment:   Plan:     (E11.9) Type 2 diabetes mellitus without complication, without long-term current use of insulin (H)  Comment: chaning to 500mg tabs  Plan: metFORMIN (GLUCOPHAGE XR) 500 MG 24 hr tablet              RTC in 3m CPE    José Luis Winter MD        Subjective     HPI related to upcoming procedure: routine senile cataract.  Overall well, no specific concerns.  Denies CP, palpitations, edema, dyspnea, HA, vision changes.        Preop Questions 9/30/2022   1. Have you ever had a heart attack or stroke? No   2. Have you ever had surgery on your heart or blood vessels, such as a stent placement, a coronary artery bypass, or surgery on an artery in your head, neck, heart, or legs? No   3. Do you have chest pain with activity? No   4. Do you have a history of  heart failure? No   5. Do you currently have a cold, bronchitis or symptoms of other infection? No   6. Do you have a cough, shortness of breath, or wheezing? No   7. Do you or anyone in your family have previous history of blood clots? UNKNOWN - Not that she knows  of    8. Do you or does anyone in your family have a serious bleeding problem such as prolonged bleeding following surgeries or cuts? YES - She had when her son was born   9. Have you ever had problems with anemia or been told to take iron pills? She has low iron after surgerys   10. Have you had any abnormal blood loss such as black, tarry or bloody stools, or abnormal vaginal bleeding? yes   11. Have you ever had a blood transfusion? YES    11a. Have you ever had a transfusion reaction? No   12. Are you willing to have a blood transfusion if it is medically needed before, during, or after your surgery? Yes   13. Have you or any of your relatives ever had problems with anesthesia? YES - she has had problems. One time she had an allergic reaction but she also throws up   14. Do you have sleep apnea, excessive snoring or daytime drowsiness? YES - Snoring    14a. Do you have a CPAP machine? No   15. Do you have any artifical heart valves or other implanted medical devices like a pacemaker, defibrillator, or continuous glucose monitor? No   16. Do you have artificial joints? No   17. Are you allergic to latex? No   18. Is there any chance that you may be pregnant? -     Health Care Directive:  Patient does not have a Health Care Directive or Living Will: Discussed advance care planning with patient; however, patient declined at this time.    Preoperative Review of :   reviewed - controlled substances reflected in medication list.      Status of Chronic Conditions:  DIABETES - Patient has a longstanding history of DiabetesType Type II . Patient is being treated with oral agents and denies significant side effects. Control has been good. Complicating factors include but are not limited to: hyperlipidemia.     HYPERLIPIDEMIA - Patient has a long history of significant Hyperlipidemia requiring medication for treatment with recent good control. Patient reports no problems or side effects with the medication.        Review of Systems  CONSTITUTIONAL: NEGATIVE for fever, chills, change in weight  INTEGUMENTARY/SKIN: NEGATIVE for worrisome rashes, moles or lesions  EYES: NEGATIVE for vision changes or irritation  ENT/MOUTH: NEGATIVE for ear, mouth and throat problems  RESP: NEGATIVE for significant cough or SOB  CV: NEGATIVE for chest pain, palpitations or peripheral edema  GI: NEGATIVE for nausea, abdominal pain, heartburn, or change in bowel habits  : NEGATIVE for frequency, dysuria, or hematuria  MUSCULOSKELETAL: NEGATIVE for significant arthralgias or myalgia  NEURO: NEGATIVE for weakness, dizziness or paresthesias  ENDOCRINE: NEGATIVE for temperature intolerance, skin/hair changes  HEME: NEGATIVE for bleeding problems  PSYCHIATRIC: NEGATIVE for changes in mood or affect    Patient Active Problem List    Diagnosis Date Noted     S/P ankle joint replacement 10/17/2022     Priority: Medium     Status post small bowel resection 07/19/2019     Priority: Medium     Adenomatous colon polyp 07/26/2018     Priority: Medium     Formatting of this note might be different from the original.  Colonoscopy 7/2018 polyp, repeat in 5 years       Type 2 diabetes mellitus without complication, without long-term current use of insulin (H) 07/19/2018     Priority: Medium     Hyperlipidemia LDL goal <100 07/11/2017     Priority: Medium     Status post right breast reconstruction 03/17/2017     Priority: Medium     Status post right mastectomy 03/17/2017     Priority: Medium     ADD (attention deficit disorder) 04/12/2013     Priority: Medium     Hyperplastic colon polyp 09/28/2012     Priority: Medium     Formatting of this note might be different from the original.  Colonoscopy 9/2012 normal repeat in 10 years       Anxiety 05/18/2010     Priority: Medium     GERD (gastroesophageal reflux disease) 05/20/2008     Priority: Medium     Arthropathy      Priority: Medium     from right ankle fracture.  Problem list name updated by automated  process. Provider to review       Acute reaction to stress 2006     Priority: Medium     Problem list name updated by automated process. Provider to review        Past Medical History:   Diagnosis Date     arthritis     from right ankle fracture.     Diabetes (H)      Malignant neoplasm of right female breast (H) 2017     PONV (postoperative nausea and vomiting)      TBI (traumatic brain injury) 2012    age 2. coma x 3 weeks.      Past Surgical History:   Procedure Laterality Date     ABDOMEN SURGERY       x 4      ARTHROPLASTY REVISION ANKLE Right 10/17/2022    Procedure: RIGHT ANKLE FUSION CONVERSION TO A TOTAL ANKLE ARTHROPLASTY;  Surgeon: Shahab Bagley MD;  Location:  OR     ENT SURGERY      Tonsillectomy     MAMMOPLASTY REDUCTION Left 2017    Procedure: MAMMOPLASTY REDUCTION;  LEFT BREAST REDUCTION MAMMOPLASTY ; RIGHT BREAST REVISION TRAM FLAP ;  Surgeon: Charan Lane MD;  Location: [facility]     MASTECTOMY, RECONSTRUCT BREAST, COMBINED Right 2017     REVISION TRAM FLAP Right 2017    Procedure: REVISION TRAM FLAP;;  Surgeon: Charan Lane MD;  Location: [facility]     SURGICAL HISTORY OF -   2004    r leg broken in several areas; now arthritis. 3 surgeries     SURGICAL HISTORY OF -       right ankle fusion     SURGICAL HISTORY OF -  Left 2018    incisional hernia repair and small bowel resection (incarcerated)     Current Outpatient Medications   Medication Sig Dispense Refill     atorvastatin (LIPITOR) 10 MG tablet Take 1 tablet (10 mg) by mouth daily 90 tablet 1     Calcium Carbonate Antacid (TUMS PO) Take 1-2 chew tab by mouth as needed       metFORMIN (GLUCOPHAGE) 1000 MG tablet TAKE 1 TABLET TWICE DAILY  WITH MEALS 180 tablet 1     Semaglutide (RYBELSUS) 7 MG TABS Take 1 tablet by mouth daily 90 tablet 1     venlafaxine (EFFEXOR XR) 37.5 MG 24 hr capsule Take 1 capsule (37.5 mg) by mouth daily 90 capsule 1     VITAMIN D PO Take 1  capsule by mouth daily       zoledronic acid (ZOMETA) 4 MG/100ML SOLN Inject 4 mg into the vein every 6 months       ACE/ARB/ARNI NOT PRESCRIBED, INTENTIONAL, Please choose reason not prescribed, below       aspirin (ASA) 81 MG EC tablet Take 1 tablet (81 mg) by mouth 2 times daily (Patient not taking: Reported on 12/1/2022) 60 tablet 0     blood glucose (ACCU-CHEK GUIDE) test strip Use to test blood sugar 1 time daily or as directed. (Patient not taking: Reported on 12/1/2022) 50 strip 11     blood glucose (NO BRAND SPECIFIED) lancets standard Use to test blood sugar 1 times daily or as directed (Patient not taking: Reported on 12/1/2022) 1 each 0     blood glucose (NO BRAND SPECIFIED) test strip Use to test blood sugars 1 times daily or as directed (Patient not taking: Reported on 12/1/2022) 100 strip 1     blood glucose monitoring (NO BRAND SPECIFIED) meter device kit Use to test blood sugar 1 times daily or as directed.  Please give needed supplies as well, including lancets (#100), test strips (#100) and control solution. (Patient not taking: Reported on 12/1/2022) 1 kit 0     blood glucose monitoring (SOFTCLIX) lancets Use to test blood sugar 1 time daily or as directed. (Patient not taking: Reported on 12/1/2022) 100 each 11     diazepam (VALIUM) 5 MG tablet Take 1 tablet (5 mg) by mouth every 6 hours as needed for muscle spasms (Patient not taking: Reported on 12/1/2022) 15 tablet 0     exemestane (AROMASIN) 25 MG tablet Take 25 mg by mouth daily       hydrOXYzine (ATARAX) 25 MG tablet Take 1 tablet (25 mg) by mouth every 6 hours as needed for other (adjuvant pain) (Patient not taking: Reported on 12/1/2022) 40 tablet 1     ondansetron (ZOFRAN ODT) 4 MG ODT tab Take 1 tablet (4 mg) by mouth every 6 hours as needed for nausea or vomiting (Patient not taking: Reported on 12/1/2022) 15 tablet 1     oxyCODONE (ROXICODONE) 5 MG tablet Take 1 tablet (5 mg) by mouth every 4 hours as needed for moderate to severe  "pain (Patient not taking: Reported on 12/1/2022) 40 tablet 0       Allergies   Allergen Reactions     Other Drug Allergy (See Comments)      Low BP and itching from \"some kind of anesthesia agent\" with birth of child and colonoscopy. Patient to discuss this with MDA. \"I was fine in 2008\"     Nickel Rash        Social History     Tobacco Use     Smoking status: Never     Smokeless tobacco: Never   Substance Use Topics     Alcohol use: Yes     Comment: 1/month        History   Drug Use No         Objective     /84 (BP Location: Right arm, Patient Position: Sitting, Cuff Size: Adult Large)   Pulse 109   Temp 97.9  F (36.6  C) (Tympanic)   Resp 15   Ht 1.549 m (5' 1\")   Wt 70.9 kg (156 lb 3.2 oz)   LMP 11/18/2013   SpO2 93%   BMI 29.51 kg/m      Physical Exam  GENERAL APPEARANCE: healthy, alert and no distress  HENT: ear canals and TM's normal and nose and mouth without ulcers or lesions  RESP: lungs clear to auscultation - no rales, rhonchi or wheezes  CV: regular rate and rhythm, normal S1 S2, no S3 or S4 and no murmur, click or rub   ABDOMEN: soft, nontender, no HSM or masses and bowel sounds normal  NEURO: Normal strength and tone, sensory exam grossly normal, mentation intact and speech normal    Recent Labs   Lab Test 10/18/22  0743 10/17/22  0807 09/30/22  0909 05/26/22  0949 02/09/22  1453 11/18/21  1109   HGB 12.8  --   --   --   --  15.3   PLT  --   --   --   --   --  203   NA  --   --   --  138  --  137   POTASSIUM  --  3.8  --  4.7  --  4.1   CR  --   --   --  0.60  --  0.65   A1C  --   --  8.2* 7.3*   < > 8.2*    < > = values in this interval not displayed.        Diagnostics:  No labs were ordered during this visit.       Revised Cardiac Risk Index (RCRI):  The patient has the following serious cardiovascular risks for perioperative complications:   - No serious cardiac risks = 0 points     RCRI Interpretation: 0 points: Class I (very low risk - 0.4% complication rate)           Signed " Electronically by: José Luis Olvera MD  Copy of this evaluation report is provided to requesting physician.

## 2022-12-01 NOTE — TELEPHONE ENCOUNTER
Please see tibdit message in reference to Change to metformin for ease in swallowing. Routed to PCP, Please review and advise.     Thank you,    Grupo Lima RN

## 2022-12-01 NOTE — PATIENT INSTRUCTIONS
Preparing for Your Surgery  Getting started  A nurse will call you to review your health history and instructions. They will give you an arrival time based on your scheduled surgery time. Please be ready to share:    Your doctor's clinic name and phone number    Your medical, surgical, and anesthesia history    A list of allergies and sensitivities    A list of medicines, including herbal treatments and over-the-counter drugs    Whether the patient has a legal guardian (ask how to send us the papers in advance)  Please tell us if you're pregnant--or if there's any chance you might be pregnant. Some surgeries may injure a fetus (unborn baby), so they require a pregnancy test. Surgeries that are safe for a fetus don't always need a test, and you can choose whether to have one.   If you have a child who's having surgery, please ask for a copy of Preparing for Your Child's Surgery.    Preparing for surgery    Within 10 to 30 days of surgery: Have a pre-op exam (sometimes called an H&P, or History and Physical). This can be done at a clinic or pre-operative center.  ? If you're having a , you may not need this exam. Talk to your care team.    At your pre-op exam, talk to your care team about all medicines you take. If you need to stop any medicines before surgery, ask when to start taking them again.  ? We do this for your safety. Many medicines can make you bleed too much during surgery. Some change how well surgery (anesthesia) drugs work.    Call your insurance company to let them know you're having surgery. (If you don't have insurance, call [phone].)    Call your clinic if there's any change in your health. This includes signs of a cold or flu (sore throat, runny nose, cough, rash, fever). It also includes a scrape or scratch near the surgery site.    If you have questions on the day of surgery, call your hospital or surgery center.  COVID testing  You may need to be tested for COVID-19 before having  surgery. If so, we will give you instructions (or click here).  Eating and drinking guidelines  For your safety: Unless your surgeon tells you otherwise, follow the guidelines below.    Eat and drink as usual until 8 hours before you arrive for surgery. After that, no food or milk.    Drink clear liquids until 2 hours before you arrive. These are liquids you can see through, like water, Gatorade, and Propel Water. They also include plain black coffee and tea (no cream or milk), candy, and breath mints. You can spit out gum when you arrive.    If you drink alcohol: Stop drinking it the night before surgery.    If your care team tells you to take medicine on the morning of surgery, it's okay to take it with a sip of water.  Preventing infection    Shower or bathe the night before and morning of your surgery. Follow the instructions your clinic gave you. (If no instructions, use regular soap.)    Don't shave or clip hair near your surgery site. We'll remove the hair if needed.    Don't smoke or vape the morning of surgery. You may chew nicotine gum up to 2 hours before surgery. A nicotine patch is okay.  ? Note: Some surgeries require you to completely quit smoking and nicotine. Check with your surgeon.    Your care team will make every effort to keep you safe from infection. We will:  ? Clean our hands often with soap and water (or an alcohol-based hand rub).  ? Clean the skin at your surgery site with a special soap that kills germs.  ? Give you a special gown to keep you warm. (Cold raises the risk of infection.)  ? Wear special hair covers, masks, gowns and gloves during surgery.  ? Give antibiotic medicine, if prescribed. Not all surgeries need antibiotics.  What to bring on the day of surgery    Photo ID and insurance card    Copy of your health care directive, if you have one    Glasses and hearing aids (bring cases)  ? You can't wear contacts during surgery    Inhaler and eye drops, if you use them (tell us  about these when you arrive)    CPAP machine or breathing device, if you use them    A few personal items, if spending the night    If you have . . .  ? A pacemaker, ICD (cardiac defibrillator) or other implant: Bring the ID card.  ? An implanted stimulator: Bring the remote control.  ? A legal guardian: Bring a copy of the certified (court-stamped) guardianship papers.  Please remove any jewelry, including body piercings. Leave jewelry and other valuables at home.  If you're going home the day of surgery    You must have a responsible adult drive you home. They should stay with you overnight as well.    If you don't have someone to stay with you, and you aren't safe to go home alone, we may keep you overnight. Insurance often won't pay for this.  After surgery  If it's hard to control your pain or you need more pain medicine, please call your surgeon's office.  Questions?   If you have any questions for your care team, list them here: _________________________________________________________________________________________________________________________________________________________________________ ____________________________________ ____________________________________ ____________________________________  For informational purposes only. Not to replace the advice of your health care provider. Copyright   2003, 2019 [facility]. All rights reserved. Clinically reviewed by Ofe Gan MD. [employer] [id number] - REV 10/22.

## 2022-12-05 NOTE — TELEPHONE ENCOUNTER
Per lab result note of 09/30/22-Your A1c is actually up a bit.  For now I'd advise that you double your Rybelsus.  Refill request is for 7 mg daily.  Please advise.     CATHY Dash RN

## 2022-12-05 NOTE — TELEPHONE ENCOUNTER
Teed up medication and sending to refill pool for processing.    Grupo Lima RN on 12/5/2022 at 7:31 AM

## 2022-12-06 NOTE — TELEPHONE ENCOUNTER
It looks like she had been taking Rybelsus 7 mg daily and was recommended to double her dose - so increase to 14 mg daily - after labs 9/30/22 showed elevated A1c at 8.2. Sent Zang message to see if she increased dose or is still taking 7 mg daily.    Em Espino PA-C

## 2022-12-09 DIAGNOSIS — E11.9 TYPE 2 DIABETES MELLITUS WITHOUT COMPLICATION, WITHOUT LONG-TERM CURRENT USE OF INSULIN (H): ICD-10-CM

## 2022-12-09 RX ORDER — ORAL SEMAGLUTIDE 7 MG/1
TABLET ORAL
Qty: 90 TABLET | Refills: 1
Start: 2022-12-09

## 2022-12-09 RX ORDER — ORAL SEMAGLUTIDE 7 MG/1
1 TABLET ORAL DAILY
Qty: 90 TABLET | Refills: 0 | Status: SHIPPED | OUTPATIENT
Start: 2022-12-09 | End: 2023-03-10

## 2023-03-10 ENCOUNTER — MYC MEDICAL ADVICE (OUTPATIENT)
Dept: FAMILY MEDICINE | Facility: CLINIC | Age: 63
End: 2023-03-10
Payer: COMMERCIAL

## 2023-03-10 ENCOUNTER — TELEPHONE (OUTPATIENT)
Dept: FAMILY MEDICINE | Facility: CLINIC | Age: 63
End: 2023-03-10
Payer: COMMERCIAL

## 2023-03-10 DIAGNOSIS — E11.9 TYPE 2 DIABETES MELLITUS WITHOUT COMPLICATION, WITHOUT LONG-TERM CURRENT USE OF INSULIN (H): ICD-10-CM

## 2023-03-10 RX ORDER — ORAL SEMAGLUTIDE 7 MG/1
TABLET ORAL
Qty: 90 TABLET | Refills: 0 | Status: SHIPPED | OUTPATIENT
Start: 2023-03-10 | End: 2023-03-13 | Stop reason: DRUGHIGH

## 2023-03-10 NOTE — TELEPHONE ENCOUNTER
Routing refill request to provider for review/approval because:  Labs out of range: A1c   Labs not current: A1c     Hemoglobin A1C   Date Value Ref Range Status   09/30/2022 8.2 (H) 0.0 - 5.6 % Final     Comment:     Normal <5.7%   Prediabetes 5.7-6.4%    Diabetes 6.5% or higher     Note: Adopted from ADA consensus guidelines.   05/25/2021 9.5 (H) 0 - 5.6 % Final     Comment:     Normal <5.7% Prediabetes 5.7-6.4%  Diabetes 6.5% or higher - adopted from ADA   consensus guidelines.  Reviewed: OK with previous       Sun BRADFORD RN   [facility]

## 2023-03-10 NOTE — TELEPHONE ENCOUNTER
Reason for Call:  Appointment Request    Patient requesting this type of appt:  Follow up    Requested provider: José Luis Olvera or Celeste Valenzuela    Reason patient unable to be scheduled: Not within requested timeframe    When does patient want to be seen/preferred time: 1-2 weeks    Comments: Patient is looking to complete a diabetic follow up with labs sometime in March with either Celeste Valenzuela or José Luis Olvera, pt would also like to discuss a medication change.     Could we send this information to you in [employer] or would you prefer to receive a phone call?:   Patient would prefer a phone call most, and BTR message if possible.  Okay to leave a detailed message?: Yes at Cell number on file:    Telephone Information:   Mobile [phone]       Call taken on 3/10/2023 at 2:57 PM by Renae Snog

## 2023-03-13 ENCOUNTER — OFFICE VISIT (OUTPATIENT)
Dept: FAMILY MEDICINE | Facility: CLINIC | Age: 63
End: 2023-03-13
Payer: COMMERCIAL

## 2023-03-13 VITALS
TEMPERATURE: 98.6 F | DIASTOLIC BLOOD PRESSURE: 88 MMHG | HEART RATE: 104 BPM | WEIGHT: 154.4 LBS | RESPIRATION RATE: 16 BRPM | OXYGEN SATURATION: 96 % | BODY MASS INDEX: 29.15 KG/M2 | SYSTOLIC BLOOD PRESSURE: 130 MMHG | HEIGHT: 61 IN

## 2023-03-13 DIAGNOSIS — E11.9 TYPE 2 DIABETES MELLITUS WITHOUT COMPLICATION, WITHOUT LONG-TERM CURRENT USE OF INSULIN (H): ICD-10-CM

## 2023-03-13 DIAGNOSIS — E78.5 HYPERLIPIDEMIA LDL GOAL <100: ICD-10-CM

## 2023-03-13 LAB
CREAT UR-MCNC: 112 MG/DL
HBA1C MFR BLD: 8.4 % (ref 0–5.6)
MICROALBUMIN UR-MCNC: 14.4 MG/L
MICROALBUMIN/CREAT UR: 12.86 MG/G CR (ref 0–25)

## 2023-03-13 PROCEDURE — 36415 COLL VENOUS BLD VENIPUNCTURE: CPT | Performed by: NURSE PRACTITIONER

## 2023-03-13 PROCEDURE — 83036 HEMOGLOBIN GLYCOSYLATED A1C: CPT | Performed by: NURSE PRACTITIONER

## 2023-03-13 PROCEDURE — 82570 ASSAY OF URINE CREATININE: CPT | Performed by: NURSE PRACTITIONER

## 2023-03-13 PROCEDURE — 82043 UR ALBUMIN QUANTITATIVE: CPT | Performed by: NURSE PRACTITIONER

## 2023-03-13 PROCEDURE — 99214 OFFICE O/P EST MOD 30 MIN: CPT | Performed by: NURSE PRACTITIONER

## 2023-03-13 RX ORDER — ORAL SEMAGLUTIDE 7 MG/1
7 TABLET ORAL DAILY
Qty: 90 TABLET | Refills: 0 | Status: CANCELLED | OUTPATIENT
Start: 2023-03-13

## 2023-03-13 RX ORDER — ATORVASTATIN CALCIUM 10 MG/1
10 TABLET, FILM COATED ORAL DAILY
Qty: 90 TABLET | Refills: 1 | COMMUNITY
Start: 2023-03-13

## 2023-03-13 ASSESSMENT — PAIN SCALES - GENERAL: PAINLEVEL: NO PAIN (0)

## 2023-03-13 NOTE — PROGRESS NOTES
"  Assessment & Plan     Type 2 diabetes mellitus without complication, without long-term current use of insulin (H)  Chronic unstable; will check labs today; discussed possible dose increasing based on results; encouraged patient to take medications as prescribed as she states she misses her HS metformin dosing.      - Hemoglobin A1c; Future  - Albumin Random Urine Quantitative with Creat Ratio; Future      Hyperlipidemia LDL goal <100  Chronic unstable; discussed compliance at length patient agreed to restart dosing states she is going to start taking in the morning otherwise she does not remember.     - atorvastatin (LIPITOR) 10 MG tablet; Take 1 tablet (10 mg) by mouth daily       BMI:   Estimated body mass index is 29.41 kg/m  as calculated from the following:    Height as of this encounter: 1.543 m (5' 0.75\").    Weight as of this encounter: 70 kg (154 lb 6.4 oz).   Weight management plan: Discussed healthy diet and exercise guidelines      Return in about 6 months (around 9/13/2023) for Routine preventive.    CARL Owen CNP  [facility] TERRELL Moore is a 62 year old, presenting for the following health issues:  Diabetes and Medication Change (RYBELSUS 7 MG tablet)      History of Present Illness       Diabetes:   She presents for follow up of diabetes.  She is not checking blood glucose. She is concerned about blood sugar frequently over 200. She is not experiencing numbness or burning in feet, excessive thirst, blurry vision, weight changes or redness, sores or blisters on feet.         She eats 2-3 servings of fruits and vegetables daily.She consumes 0 sweetened beverage(s) daily.She exercises with enough effort to increase her heart rate 9 or less minutes per day.  She exercises with enough effort to increase her heart rate 3 or less days per week.   She is taking medications regularly.     Patient does not check bg at home; concerned there has been an increase in " "sugars; states she does not always take her evening dosing of metformin. Denies changes in diet.     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    States she does not take atorvastatin d/t it being an evening dosing; will only take if she can take in the morning. Does not like taking medication before bedtime d/t stomach upset and states her schedule is too inconsistent to take >1 hour before bed.       Review of Systems   Constitutional, HEENT, cardiovascular, pulmonary, gi and gu systems are negative, except as otherwise noted.      Objective    /88 (BP Location: Right arm, Patient Position: Sitting, Cuff Size: Adult Regular)   Pulse 104   Temp 98.6  F (37  C) (Oral)   Resp 16   Ht 1.543 m (5' 0.75\")   Wt 70 kg (154 lb 6.4 oz)   LMP 11/18/2013   SpO2 96%   BMI 29.41 kg/m    Body mass index is 29.41 kg/m .  Physical Exam   GENERAL: healthy, alert and no distress  RESP: lungs clear to auscultation - no rales, rhonchi or wheezes  CV: regular rate and rhythm, normal S1 S2, no S3 or S4, no murmur, click or rub, no peripheral edema and peripheral pulses strong  Diabetic foot exam: normal DP and PT pulses, no trophic changes or ulcerative lesions, normal sensory exam and normal monofilament exam              "

## 2023-03-13 NOTE — NURSING NOTE
"Chief Complaint   Patient presents with     Diabetes     Medication Change     RYBELSUS 7 MG tablet     Initial /88 (BP Location: Right arm, Patient Position: Sitting, Cuff Size: Adult Regular)   Pulse 104   Temp 98.6  F (37  C) (Oral)   Resp 16   Ht 1.543 m (5' 0.75\")   Wt 70 kg (154 lb 6.4 oz)   LMP 11/18/2013   SpO2 96%   BMI 29.41 kg/m   Estimated body mass index is 29.41 kg/m  as calculated from the following:    Height as of this encounter: 1.543 m (5' 0.75\").    Weight as of this encounter: 70 kg (154 lb 6.4 oz).  BP completed using cuff size regular right arm    Lisa Magill, CMA    "

## 2023-03-25 ENCOUNTER — HEALTH MAINTENANCE LETTER (OUTPATIENT)
Age: 63
End: 2023-03-25

## 2023-05-20 DIAGNOSIS — E11.9 TYPE 2 DIABETES MELLITUS WITHOUT COMPLICATION, WITHOUT LONG-TERM CURRENT USE OF INSULIN (H): ICD-10-CM

## 2023-05-21 ENCOUNTER — HEALTH MAINTENANCE LETTER (OUTPATIENT)
Age: 63
End: 2023-05-21

## 2023-05-22 RX ORDER — METFORMIN HCL 500 MG
TABLET, EXTENDED RELEASE 24 HR ORAL
Qty: 360 TABLET | Refills: 1 | Status: SHIPPED | OUTPATIENT
Start: 2023-05-22 | End: 2024-03-22

## 2023-05-22 NOTE — TELEPHONE ENCOUNTER
Prescription approved per [facility] Refill Protocol.  Adam DILLARD RN 5/22/2023 at 2:50 PM

## 2023-06-08 ENCOUNTER — TRANSFERRED RECORDS (OUTPATIENT)
Dept: HEALTH INFORMATION MANAGEMENT | Facility: CLINIC | Age: 63
End: 2023-06-08
Payer: COMMERCIAL

## 2023-06-09 DIAGNOSIS — E11.9 TYPE 2 DIABETES MELLITUS WITHOUT COMPLICATION, WITHOUT LONG-TERM CURRENT USE OF INSULIN (H): ICD-10-CM

## 2023-06-09 RX ORDER — ORAL SEMAGLUTIDE 14 MG/1
TABLET ORAL
Qty: 90 TABLET | Refills: 0 | Status: SHIPPED | OUTPATIENT
Start: 2023-06-09 | End: 2023-09-13

## 2023-09-12 DIAGNOSIS — E11.9 TYPE 2 DIABETES MELLITUS WITHOUT COMPLICATION, WITHOUT LONG-TERM CURRENT USE OF INSULIN (H): ICD-10-CM

## 2023-09-12 NOTE — TELEPHONE ENCOUNTER
Routing refill request to provider for review/approval because:    HgbA1C in past 3 or 6 months   Normal serum creatinine on file in past 12 months   Recent (6 mo) or future (30 days) visit within the authorizing provider's specialty     Lab Results   Component Value Date    A1C 8.4 03/13/2023    A1C 8.2 09/30/2022    A1C 7.3 05/26/2022    A1C 8.0 02/09/2022    A1C 8.2 11/18/2021    A1C 9.5 05/25/2021    A1C 8.4 01/18/2021    A1C 7.6 10/19/2020    A1C 8.4 02/27/2020    A1C 6.9 07/19/2019     Creatinine   Date Value Ref Range Status   05/26/2022 0.60 0.52 - 1.04 mg/dL Final   10/19/2020 0.68 0.52 - 1.04 mg/dL Final     Madison Lei, Registered Nurse  [facility]

## 2023-09-13 RX ORDER — ORAL SEMAGLUTIDE 14 MG/1
TABLET ORAL
Qty: 90 TABLET | Refills: 0 | Status: SHIPPED | OUTPATIENT
Start: 2023-09-13 | End: 2023-11-21

## 2023-09-13 NOTE — TELEPHONE ENCOUNTER
Sent Asterion message requesting a call back for an appt. Two more attempts will be made.    Snehal Mckenzie  Lead

## 2023-10-28 ENCOUNTER — HEALTH MAINTENANCE LETTER (OUTPATIENT)
Age: 63
End: 2023-10-28

## 2023-11-13 ENCOUNTER — TELEPHONE (OUTPATIENT)
Dept: FAMILY MEDICINE | Facility: CLINIC | Age: 63
End: 2023-11-13

## 2023-11-13 NOTE — LETTER
[facility]  [address] [address] [phone]       February 29, 2024    Teresa Hahn  [address]  [place] [address]    Dear Teresa,    We care about your health and have reviewed your health plan and are making recommendations based on this review, to optimize your health.    You are in particular need of attention regarding:  -Diabetes  -Cervical Cancer Screening  -Wellness (Physical) Visit     We are recommending that you:  -schedule a WELLNESS (Physical) APPOINTMENT with me.   I will check fasting labs the same day - nothing to eat except water and meds for 8-10 hours prior.      -schedule a PAP SMEAR EXAM which is due.  Please disregard this reminder if you have had this exam elsewhere within the last year.  It would be helpful for us to have a copy of your recent pap smear report in our file so that we can best coordinate your care.    If you are under/uninsured, we recommend you contact the [facility]. They offer pap smears at no charge or on a sliding fee charge. You can schedule with them at [phone]. Please have them send us the results.    -Diabetic Eye Exam is due.  If this was done within the last 12 months then please contact the clinic with the date and location so we can update your records.    In addition, here is a list of due or overdue Health Maintenance reminders.    Health Maintenance Due   Topic Date Due    Zoster (Shingles) Vaccine (1 of 2) Never done    RSV VACCINE (Pregnancy & 60+) (1 - 1-dose 60+ series) Never done    Yearly Preventive Visit  03/21/2023    Eye Exam  06/28/2023    Flu Vaccine (1) 09/01/2023    PAP Smear  01/01/2024    Kidney Microalbumin Urine Test  03/13/2024    Diabetic Foot Exam  03/13/2024       To address the above recommendations, we encourage you to contact us at [phone], via [employer] or by contacting Central Scheduling toll free at [phone] 24 hours a day. They will assist you with  finding the most convenient time and location.    Thank you for trusting [facility] and we appreciate the opportunity to serve you.  We look forward to supporting your healthcare needs in the future.    Healthy Regards,    Your [facility] Team

## 2023-11-13 NOTE — CONFIDENTIAL NOTE
Patient Quality Outreach    Patient is due for the following:   Diabetes -  Eye Exam  Colon Cancer Screening  Cervical Cancer Screening - PAP Needed  Depression  -  PHQ-9 needed  Physical Preventive Adult Physical    Next Steps:   Schedule a Adult Preventative    Type of outreach:    Sent ip.access message.      Questions for provider review:    None           Aram Martinez MA

## 2023-11-13 NOTE — LETTER
[facility]  [address] [address] [phone]       November 30, 2023    Teresa Hahn  [address]  [place] [address]    Dear Teresa,    We care about your health and have reviewed your health plan and are making recommendations based on this review, to optimize your health.    You are in particular need of attention regarding:  -Diabetes  -Breast Cancer Screening  -Wellness (Physical) Visit     We are recommending that you:  -schedule a WELLNESS (Physical) APPOINTMENT with me.   I will check fasting labs the same day - nothing to eat except water and meds for 8-10 hours prior.      -schedule a MAMMOGRAM which is due.    1 in 8 women will develop invasive breast cancer during her lifetime and it is the most common non-skin cancer in American women.  EARLY detection, new treatments, and a better understanding of the disease have increased survival rates - the 5 year survival rate in the 1960s was 63% and today it is close to 90%.    If you are under/uninsured, we recommend you contact the [facility]. They offer mammograms at no charge or on a sliding fee charge. You can schedule with them at [phone]. Please have them send us the results.      Please disregard this reminder if you have had this exam elsewhere within the last year.  It would be helpful for us to have a copy of your mammogram report in your file so that we can best coordinate your care - please contact us with when your test was done so we can update your record.             -Diabetic Eye Exam is due.  If this was done within the last 12 months then please contact the clinic with the date and location so we can update your records.    In addition, here is a list of due or overdue Health Maintenance reminders.    Health Maintenance Due   Topic Date Due    Zoster (Shingles) Vaccine (1 of 2) Never done    RSV VACCINE (Pregnancy & 60+) (1 - 1-dose 60+ series) Never done    Mammogram   12/23/2022    Yearly Preventive Visit  03/21/2023    Eye Exam  06/28/2023    Flu Vaccine (1) 09/01/2023    PAP Smear  01/01/2024       To address the above recommendations, we encourage you to contact us at [phone], via [employer] or by contacting Central Scheduling toll free at [phone] 24 hours a day. They will assist you with finding the most convenient time and location.    Thank you for trusting [facility] and we appreciate the opportunity to serve you.  We look forward to supporting your healthcare needs in the future.    Healthy Regards,    Your [facility] Team

## 2023-11-21 ENCOUNTER — OFFICE VISIT (OUTPATIENT)
Dept: FAMILY MEDICINE | Facility: CLINIC | Age: 63
End: 2023-11-21
Payer: COMMERCIAL

## 2023-11-21 VITALS
TEMPERATURE: 98 F | HEART RATE: 71 BPM | DIASTOLIC BLOOD PRESSURE: 80 MMHG | BODY MASS INDEX: 29.45 KG/M2 | SYSTOLIC BLOOD PRESSURE: 138 MMHG | RESPIRATION RATE: 16 BRPM | WEIGHT: 156 LBS | OXYGEN SATURATION: 97 % | HEIGHT: 61 IN

## 2023-11-21 DIAGNOSIS — E11.9 TYPE 2 DIABETES MELLITUS WITHOUT COMPLICATION, WITHOUT LONG-TERM CURRENT USE OF INSULIN (H): Primary | ICD-10-CM

## 2023-11-21 LAB
ALBUMIN SERPL BCG-MCNC: 4.4 G/DL (ref 3.5–5.2)
ALP SERPL-CCNC: 83 U/L (ref 40–150)
ALT SERPL W P-5'-P-CCNC: 62 U/L (ref 0–50)
ANION GAP SERPL CALCULATED.3IONS-SCNC: 11 MMOL/L (ref 7–15)
AST SERPL W P-5'-P-CCNC: 37 U/L (ref 0–45)
BILIRUB DIRECT SERPL-MCNC: <0.2 MG/DL (ref 0–0.3)
BILIRUB SERPL-MCNC: 0.3 MG/DL
BUN SERPL-MCNC: 7.8 MG/DL (ref 8–23)
CALCIUM SERPL-MCNC: 9.4 MG/DL (ref 8.8–10.2)
CHLORIDE SERPL-SCNC: 103 MMOL/L (ref 98–107)
CHOLEST SERPL-MCNC: 190 MG/DL
CREAT SERPL-MCNC: 0.57 MG/DL (ref 0.51–0.95)
DEPRECATED HCO3 PLAS-SCNC: 23 MMOL/L (ref 22–29)
EGFRCR SERPLBLD CKD-EPI 2021: >90 ML/MIN/1.73M2
GLUCOSE SERPL-MCNC: 231 MG/DL (ref 70–99)
HBA1C MFR BLD: 8.3 % (ref 0–5.6)
HDLC SERPL-MCNC: 31 MG/DL
LDLC SERPL CALC-MCNC: 129 MG/DL
NONHDLC SERPL-MCNC: 159 MG/DL
POTASSIUM SERPL-SCNC: 4 MMOL/L (ref 3.4–5.3)
PROT SERPL-MCNC: 6.6 G/DL (ref 6.4–8.3)
SODIUM SERPL-SCNC: 137 MMOL/L (ref 135–145)
TRIGL SERPL-MCNC: 152 MG/DL

## 2023-11-21 PROCEDURE — 83036 HEMOGLOBIN GLYCOSYLATED A1C: CPT | Performed by: FAMILY MEDICINE

## 2023-11-21 PROCEDURE — 82248 BILIRUBIN DIRECT: CPT | Performed by: FAMILY MEDICINE

## 2023-11-21 PROCEDURE — 90480 ADMN SARSCOV2 VAC 1/ONLY CMP: CPT | Performed by: FAMILY MEDICINE

## 2023-11-21 PROCEDURE — 80053 COMPREHEN METABOLIC PANEL: CPT | Performed by: FAMILY MEDICINE

## 2023-11-21 PROCEDURE — 91320 SARSCV2 VAC 30MCG TRS-SUC IM: CPT | Performed by: FAMILY MEDICINE

## 2023-11-21 PROCEDURE — 99214 OFFICE O/P EST MOD 30 MIN: CPT | Performed by: FAMILY MEDICINE

## 2023-11-21 PROCEDURE — 80061 LIPID PANEL: CPT | Performed by: FAMILY MEDICINE

## 2023-11-21 PROCEDURE — 36415 COLL VENOUS BLD VENIPUNCTURE: CPT | Performed by: FAMILY MEDICINE

## 2023-11-21 RX ORDER — DAPAGLIFLOZIN 10 MG/1
10 TABLET, FILM COATED ORAL DAILY
Qty: 90 TABLET | Refills: 1 | Status: SHIPPED | OUTPATIENT
Start: 2023-11-21 | End: 2024-03-22

## 2023-11-21 ASSESSMENT — ENCOUNTER SYMPTOMS
SHORTNESS OF BREATH: 0
HEADACHES: 0
CONSTITUTIONAL NEGATIVE: 1
PALPITATIONS: 0

## 2023-11-21 NOTE — PROGRESS NOTES
Assessment and Plan    (E11.9) Type 2 diabetes mellitus without complication, without long-term current use of insulin (H)  (primary encounter diagnosis)  Comment: has not been getting great reslts with oral semaglutide, will stop this and start on Farxiga (or other SGLT2I depending on formulary)  Plan: BASIC METABOLIC PANEL, Lipid panel reflex to         direct LDL Non-fasting, HEMOGLOBIN A1C, Hepatic        panel (Albumin, ALT, AST, Bili, Alk Phos, TP),         metFORMIN (GLUCOPHAGE) 500 MG tablet,         dapagliflozin (FARXIGA) 10 MG TABS tablet              RTC in     José Luis Olvera MD      Brian Moore is a 63 year old, presenting for the following health issues:  Medication Refill (Would like to discuss size of metformin pills) and Diabetes        11/21/2023     9:48 AM   Additional Questions   Roomed by mohini       HPI       Diabetes Follow-up    How often are you checking your blood sugar? Not at all  What concerns do you have today about your diabetes? None   Do you have any of these symptoms? (Select all that apply)  No numbness or tingling in feet.  No redness, sores or blisters on feet.  No complaints of excessive thirst.  No reports of blurry vision.  No significant changes to weight.  Have you had a diabetic eye exam in the last 12 months? No    No issues with medication, taking consistently.  Watching diet.  Doesn't really check her BS.  Does have some persistent nausea, not terribly but frustrating.  Does concede it started around the time she started the Rebelsus.  Is also not much appetite reduction.    Is overdue for WW exam.    BP Readings from Last 2 Encounters:   11/21/23 138/80   03/13/23 130/88     Hemoglobin A1C (%)   Date Value   11/21/2023 8.3 (H)   03/13/2023 8.4 (H)   05/25/2021 9.5 (H)   01/18/2021 8.4 (H)     LDL Cholesterol Calculated (mg/dL)   Date Value   05/26/2022 86   11/18/2021 67   05/25/2021 58   01/18/2021 125 (H)             Depression and Anxiety Follow-Up  How are  you doing with your depression since your last visit? No change  How are you doing with your anxiety since your last visit?  No change  Are you having other symptoms that might be associated with depression or anxiety? No  Have you had a significant life event? No   Do you have any concerns with your use of alcohol or other drugs? No    Social History     Tobacco Use    Smoking status: Never    Smokeless tobacco: Never   Vaping Use    Vaping Use: Never used   Substance Use Topics    Alcohol use: Yes     Comment: 1/month     Drug use: No         5/26/2022     9:40 AM 12/1/2022     7:48 AM 11/13/2023     3:27 PM   PHQ   PHQ-9 Total Score 3 2 2   Q9: Thoughts of better off dead/self-harm past 2 weeks Not at all Not at all Not at all         1/21/2021    11:31 AM 5/25/2021     2:32 PM 12/1/2022     7:48 AM   CHAKA-7 SCORE   Total Score 5 3 5         11/13/2023     3:27 PM   Last PHQ-9   1.  Little interest or pleasure in doing things 0   2.  Feeling down, depressed, or hopeless 0   3.  Trouble falling or staying asleep, or sleeping too much 1   4.  Feeling tired or having little energy 1   5.  Poor appetite or overeating 0   6.  Feeling bad about yourself 0   7.  Trouble concentrating 0   8.  Moving slowly or restless 0   Q9: Thoughts of better off dead/self-harm past 2 weeks 0   PHQ-9 Total Score 2         12/1/2022     7:48 AM   CHAKA-7    1. Feeling nervous, anxious, or on edge 1   2. Not being able to stop or control worrying 1   3. Worrying too much about different things 1   4. Trouble relaxing 1   5. Being so restless that it is hard to sit still 0   6. Becoming easily annoyed or irritable 0   7. Feeling afraid, as if something awful might happen 1   CHAKA-7 Total Score 5   If you checked any problems, how difficult have they made it for you to do your work, take care of things at home, or get along with other people? Not difficult at all       Suicide Assessment Five-step Evaluation and Treatment  "(SAFE-T)        Review of Systems   Constitutional: Negative.    Eyes:  Negative for visual disturbance.   Respiratory:  Negative for shortness of breath.    Cardiovascular:  Negative for chest pain, palpitations and peripheral edema.   Neurological:  Negative for headaches.            Objective    /80   Pulse 71   Temp 98  F (36.7  C) (Tympanic)   Resp 16   Ht 1.543 m (5' 0.75\")   Wt 70.8 kg (156 lb)   LMP 11/18/2013   SpO2 97%   BMI 29.72 kg/m    Body mass index is 29.72 kg/m .  Physical Exam  Vitals reviewed.   HENT:      Head: Normocephalic and atraumatic.   Eyes:      Conjunctiva/sclera: Conjunctivae normal.   Cardiovascular:      Rate and Rhythm: Normal rate and regular rhythm.      Heart sounds: Normal heart sounds.   Pulmonary:      Effort: Pulmonary effort is normal.      Breath sounds: Normal breath sounds.   Skin:     General: Skin is warm and dry.   Neurological:      Mental Status: She is alert and oriented to person, place, and time.   Psychiatric:         Mood and Affect: Mood normal.         Behavior: Behavior normal.                    "

## 2023-11-30 NOTE — CONFIDENTIAL NOTE
Patient Quality Outreach    Patient is due for the following:   Diabetes -  Eye Exam  Breast Cancer Screening - Mammogram  Physical Preventive Adult Physical    Next Steps:   Schedule a Adult Preventative    Type of outreach:    Sent letter.    Next Steps:  Reach out within 90 days via Letter.    Max number of attempts reached: Yes. Will try again in 90 days if patient still on fail list.    Questions for provider review:    None           Aram Martinez MA

## 2023-12-04 DIAGNOSIS — F41.9 ANXIETY: ICD-10-CM

## 2023-12-04 RX ORDER — VENLAFAXINE HYDROCHLORIDE 37.5 MG/1
CAPSULE, EXTENDED RELEASE ORAL
Qty: 90 CAPSULE | Refills: 0 | Status: SHIPPED | OUTPATIENT
Start: 2023-12-04 | End: 2024-03-07

## 2023-12-27 ENCOUNTER — TRANSFERRED RECORDS (OUTPATIENT)
Dept: HEALTH INFORMATION MANAGEMENT | Facility: CLINIC | Age: 63
End: 2023-12-27
Payer: COMMERCIAL

## 2024-02-29 NOTE — CONFIDENTIAL NOTE
Patient Quality Outreach    Patient is due for the following:   Diabetes -  Eye Exam  Cervical Cancer Screening - PAP Needed  Physical Preventive Adult Physical    Next Steps:   Schedule a Adult Preventative    Type of outreach:    Sent letter.      Questions for provider review:    None           Aram Martinez MA

## 2024-03-07 DIAGNOSIS — F41.9 ANXIETY: ICD-10-CM

## 2024-03-07 RX ORDER — VENLAFAXINE HYDROCHLORIDE 37.5 MG/1
CAPSULE, EXTENDED RELEASE ORAL
Qty: 90 CAPSULE | Refills: 0 | Status: SHIPPED | OUTPATIENT
Start: 2024-03-07 | End: 2024-06-07

## 2024-03-20 ENCOUNTER — TELEPHONE (OUTPATIENT)
Dept: FAMILY MEDICINE | Facility: CLINIC | Age: 64
End: 2024-03-20
Payer: COMMERCIAL

## 2024-03-20 NOTE — TELEPHONE ENCOUNTER
Pt calls.    She has a visit coming up on Friday.  She has type 2 diabetes.  She wants to discuss getting a continuous glucose monitor.  She said she thinks her insurance will cover it now.      Advised I can forward this to Dr. Olvera.  Advised they can talk about it at her appt.

## 2024-03-22 ENCOUNTER — OFFICE VISIT (OUTPATIENT)
Dept: FAMILY MEDICINE | Facility: CLINIC | Age: 64
End: 2024-03-22
Payer: COMMERCIAL

## 2024-03-22 VITALS
RESPIRATION RATE: 16 BRPM | WEIGHT: 150 LBS | DIASTOLIC BLOOD PRESSURE: 79 MMHG | SYSTOLIC BLOOD PRESSURE: 117 MMHG | TEMPERATURE: 98.4 F | OXYGEN SATURATION: 97 % | HEART RATE: 84 BPM | BODY MASS INDEX: 28.32 KG/M2 | HEIGHT: 61 IN

## 2024-03-22 DIAGNOSIS — E11.9 TYPE 2 DIABETES MELLITUS WITHOUT COMPLICATION, WITHOUT LONG-TERM CURRENT USE OF INSULIN (H): Primary | ICD-10-CM

## 2024-03-22 LAB
CREAT UR-MCNC: 29.5 MG/DL
HBA1C MFR BLD: 9.9 % (ref 0–5.6)
HOLD SPECIMEN: NORMAL
HOLD SPECIMEN: NORMAL
MICROALBUMIN UR-MCNC: <12 MG/L
MICROALBUMIN/CREAT UR: NORMAL MG/G{CREAT}

## 2024-03-22 PROCEDURE — 82570 ASSAY OF URINE CREATININE: CPT | Performed by: FAMILY MEDICINE

## 2024-03-22 PROCEDURE — 83036 HEMOGLOBIN GLYCOSYLATED A1C: CPT | Performed by: FAMILY MEDICINE

## 2024-03-22 PROCEDURE — 99214 OFFICE O/P EST MOD 30 MIN: CPT | Performed by: FAMILY MEDICINE

## 2024-03-22 PROCEDURE — 36415 COLL VENOUS BLD VENIPUNCTURE: CPT | Performed by: FAMILY MEDICINE

## 2024-03-22 PROCEDURE — 82043 UR ALBUMIN QUANTITATIVE: CPT | Performed by: FAMILY MEDICINE

## 2024-03-22 RX ORDER — METFORMIN HCL 500 MG
1000 TABLET, EXTENDED RELEASE 24 HR ORAL 2 TIMES DAILY WITH MEALS
Qty: 360 TABLET | Refills: 1 | Status: SHIPPED | OUTPATIENT
Start: 2024-03-22

## 2024-03-22 ASSESSMENT — ANXIETY QUESTIONNAIRES
7. FEELING AFRAID AS IF SOMETHING AWFUL MIGHT HAPPEN: NOT AT ALL
5. BEING SO RESTLESS THAT IT IS HARD TO SIT STILL: NOT AT ALL
GAD7 TOTAL SCORE: 3
1. FEELING NERVOUS, ANXIOUS, OR ON EDGE: SEVERAL DAYS
GAD7 TOTAL SCORE: 3
8. IF YOU CHECKED OFF ANY PROBLEMS, HOW DIFFICULT HAVE THESE MADE IT FOR YOU TO DO YOUR WORK, TAKE CARE OF THINGS AT HOME, OR GET ALONG WITH OTHER PEOPLE?: NOT DIFFICULT AT ALL
2. NOT BEING ABLE TO STOP OR CONTROL WORRYING: NOT AT ALL
4. TROUBLE RELAXING: NOT AT ALL
IF YOU CHECKED OFF ANY PROBLEMS ON THIS QUESTIONNAIRE, HOW DIFFICULT HAVE THESE PROBLEMS MADE IT FOR YOU TO DO YOUR WORK, TAKE CARE OF THINGS AT HOME, OR GET ALONG WITH OTHER PEOPLE: NOT DIFFICULT AT ALL
GAD7 TOTAL SCORE: 3
7. FEELING AFRAID AS IF SOMETHING AWFUL MIGHT HAPPEN: NOT AT ALL
3. WORRYING TOO MUCH ABOUT DIFFERENT THINGS: SEVERAL DAYS
6. BECOMING EASILY ANNOYED OR IRRITABLE: SEVERAL DAYS

## 2024-03-22 ASSESSMENT — PATIENT HEALTH QUESTIONNAIRE - PHQ9
SUM OF ALL RESPONSES TO PHQ QUESTIONS 1-9: 1
10. IF YOU CHECKED OFF ANY PROBLEMS, HOW DIFFICULT HAVE THESE PROBLEMS MADE IT FOR YOU TO DO YOUR WORK, TAKE CARE OF THINGS AT HOME, OR GET ALONG WITH OTHER PEOPLE: NOT DIFFICULT AT ALL
SUM OF ALL RESPONSES TO PHQ QUESTIONS 1-9: 1

## 2024-03-22 ASSESSMENT — PAIN SCALES - GENERAL: PAINLEVEL: NO PAIN (0)

## 2024-03-22 NOTE — PROGRESS NOTES
Assessment and Plan    (E11.9) Type 2 diabetes mellitus without complication, without long-term current use of insulin (H)  (primary encounter diagnosis)  Comment: No benefit from Farxiga.  Will try Jardiance to see if that works better.  Also starting CGM to get additional info on diet and effect on BS  Plan: Albumin Random Urine Quantitative with Creat         Ratio, Hemoglobin A1c, Continuous Blood Gluc          (FREESTYLE ROBBY 2 READER) YRIS,         Continuous Blood Gluc Sensor (FREESTYLE ROBBY 2        SENSOR) MISC, metFORMIN (GLUCOPHAGE XR) 500 MG         24 hr tablet, empagliflozin (JARDIANCE) 25 MG         TABS tablet              RTC in     José Luis Olvera MD      Brian Moore is a 63 year old, presenting for the following health issues:  Diabetes        3/22/2024    10:31 AM   Additional Questions   Roomed by VERA BARAJAS   Accompanied by SELF         3/22/2024    10:31 AM   Patient Reported Additional Medications   Patient reports taking the following new medications NA     History of Present Illness       Diabetes:   She presents for follow up of diabetes.    She is not checking blood glucose.        She is concerned about other.   She is having excessive thirst.  The patient has not had a diabetic eye exam in the last 12 months.          She eats 2-3 servings of fruits and vegetables daily.She consumes 0 sweetened beverage(s) daily.She exercises with enough effort to increase her heart rate 9 or less minutes per day.  She exercises with enough effort to increase her heart rate 3 or less days per week.   She is taking medications regularly.     Wants to start CGM   Having trouble taking metformin wondering if theres something different   Wants to take once a day     Doesn't feel that Farxiga has been very helpful for her.  Is thirsty all the time.  Did have one yeast infection that resolved with single treatment.        Review of Systems   Constitutional: Negative.    Eyes:  Negative for  "visual disturbance.   Respiratory:  Negative for shortness of breath.    Cardiovascular:  Negative for chest pain, palpitations and peripheral edema.   Genitourinary:  Positive for vaginal discharge.   Neurological:  Negative for headaches.               Objective    /79   Pulse 84   Temp 98.4  F (36.9  C) (Oral)   Resp 16   Ht 1.543 m (5' 0.75\")   Wt 68 kg (150 lb)   LMP 11/18/2013   SpO2 97%   BMI 28.58 kg/m    Body mass index is 28.58 kg/m .  Physical Exam  Vitals reviewed.   HENT:      Head: Normocephalic and atraumatic.   Eyes:      Conjunctiva/sclera: Conjunctivae normal.   Cardiovascular:      Rate and Rhythm: Normal rate and regular rhythm.      Heart sounds: Normal heart sounds.   Pulmonary:      Effort: Pulmonary effort is normal.      Breath sounds: Normal breath sounds.   Skin:     General: Skin is warm and dry.   Neurological:      Mental Status: She is alert and oriented to person, place, and time.   Psychiatric:         Mood and Affect: Mood normal.         Behavior: Behavior normal.                    Signed Electronically by: José Luis Olvera MD    .undefined[^^  "

## 2024-03-25 ASSESSMENT — ENCOUNTER SYMPTOMS
HEADACHES: 0
PALPITATIONS: 0
SHORTNESS OF BREATH: 0
CONSTITUTIONAL NEGATIVE: 1

## 2024-04-22 ENCOUNTER — LAB REQUISITION (OUTPATIENT)
Dept: LAB | Facility: CLINIC | Age: 64
End: 2024-04-22
Payer: COMMERCIAL

## 2024-04-22 DIAGNOSIS — Z12.4 ENCOUNTER FOR SCREENING FOR MALIGNANT NEOPLASM OF CERVIX: ICD-10-CM

## 2024-04-22 PROCEDURE — 87624 HPV HI-RISK TYP POOLED RSLT: CPT | Mod: ORL | Performed by: OBSTETRICS & GYNECOLOGY

## 2024-04-22 PROCEDURE — G0145 SCR C/V CYTO,THINLAYER,RESCR: HCPCS | Mod: ORL | Performed by: OBSTETRICS & GYNECOLOGY

## 2024-04-25 LAB
BKR LAB AP GYN ADEQUACY: NORMAL
BKR LAB AP GYN INTERPRETATION: NORMAL
BKR LAB AP HPV REFLEX: NORMAL
BKR LAB AP LMP: NORMAL
BKR LAB AP PREVIOUS ABNL DX: NORMAL
BKR LAB AP PREVIOUS ABNORMAL: NORMAL
PATH REPORT.COMMENTS IMP SPEC: NORMAL
PATH REPORT.COMMENTS IMP SPEC: NORMAL
PATH REPORT.RELEVANT HX SPEC: NORMAL

## 2024-04-29 LAB
HUMAN PAPILLOMA VIRUS 16 DNA: NEGATIVE
HUMAN PAPILLOMA VIRUS 18 DNA: NEGATIVE
HUMAN PAPILLOMA VIRUS FINAL DIAGNOSIS: NORMAL
HUMAN PAPILLOMA VIRUS OTHER HR: NEGATIVE

## 2024-05-02 ENCOUNTER — TRANSFERRED RECORDS (OUTPATIENT)
Dept: HEALTH INFORMATION MANAGEMENT | Facility: CLINIC | Age: 64
End: 2024-05-02
Payer: COMMERCIAL

## 2024-05-25 ENCOUNTER — HEALTH MAINTENANCE LETTER (OUTPATIENT)
Age: 64
End: 2024-05-25

## 2024-06-07 DIAGNOSIS — F41.9 ANXIETY: ICD-10-CM

## 2024-06-07 RX ORDER — VENLAFAXINE HYDROCHLORIDE 37.5 MG/1
CAPSULE, EXTENDED RELEASE ORAL
Qty: 90 CAPSULE | Refills: 0 | Status: SHIPPED | OUTPATIENT
Start: 2024-06-07 | End: 2024-09-05

## 2024-06-07 NOTE — TELEPHONE ENCOUNTER
Prescription approved per [facility] Refill Protocol.  Beulah Mackenzie, RN  [facility] Triage Nurse

## 2024-07-11 ENCOUNTER — OFFICE VISIT (OUTPATIENT)
Dept: FAMILY MEDICINE | Facility: CLINIC | Age: 64
End: 2024-07-11
Attending: FAMILY MEDICINE
Payer: COMMERCIAL

## 2024-07-11 VITALS
BODY MASS INDEX: 28.7 KG/M2 | TEMPERATURE: 98.4 F | SYSTOLIC BLOOD PRESSURE: 126 MMHG | DIASTOLIC BLOOD PRESSURE: 75 MMHG | OXYGEN SATURATION: 99 % | HEART RATE: 93 BPM | RESPIRATION RATE: 16 BRPM | HEIGHT: 61 IN | WEIGHT: 152 LBS

## 2024-07-11 DIAGNOSIS — E11.9 TYPE 2 DIABETES MELLITUS WITHOUT COMPLICATION, WITHOUT LONG-TERM CURRENT USE OF INSULIN (H): Primary | ICD-10-CM

## 2024-07-11 LAB — HBA1C MFR BLD: 10.4 % (ref 0–5.6)

## 2024-07-11 PROCEDURE — 83036 HEMOGLOBIN GLYCOSYLATED A1C: CPT | Performed by: NURSE PRACTITIONER

## 2024-07-11 PROCEDURE — 99214 OFFICE O/P EST MOD 30 MIN: CPT | Performed by: NURSE PRACTITIONER

## 2024-07-11 PROCEDURE — 36415 COLL VENOUS BLD VENIPUNCTURE: CPT | Performed by: NURSE PRACTITIONER

## 2024-07-11 ASSESSMENT — ENCOUNTER SYMPTOMS
DIARRHEA: 0
DYSURIA: 0
PALPITATIONS: 0
FEVER: 0
CHILLS: 0
ABDOMINAL PAIN: 0
UNEXPECTED WEIGHT CHANGE: 0
CHEST TIGHTNESS: 0
SHORTNESS OF BREATH: 0
CONSTIPATION: 0

## 2024-07-11 ASSESSMENT — PAIN SCALES - GENERAL: PAINLEVEL: NO PAIN (0)

## 2024-07-11 NOTE — PROGRESS NOTES
Assessment & Plan   Type 2 diabetes mellitus without complication, without long-term current use of insulin (H)  Poor control.  Off Jardiance.  Patient has reduced metformin dosing as well.  Trial of increasing her current metformin from 1000 mg in the morning to 1000 mg in the a.m. and 500 in the p.m.  Start Ozempic.  Titrate as tolerated.    Follow-up for recheck in 3 months and as needed    No follow-ups on file.  There are no Patient Instructions on file for this visit.  CARL Alcazar CNP  [facility] [place]  ============================================  Subjective  Type 2 diabetes mellitus without complication, without long-term current use of insulin (H)  Lab Results   Component Value Date    A1C 10.4 07/11/2024    A1C 9.9 03/22/2024    A1C 8.3 11/21/2023    A1C 8.4 03/13/2023    A1C 8.2 09/30/2022    A1C 9.5 05/25/2021    A1C 8.4 01/18/2021    A1C 7.6 10/19/2020    A1C 8.4 02/27/2020    A1C 6.9 07/19/2019     Has reduced her metformin to 1000 mg in the morning.  Feels that the metformin causes GI distress.  Has been unable to tolerate Jardiance due to severe vaginal infections.  Not checking home blood sugars.  No foot concerns.    Diabetes is improving/stable/worsening: worsening  Discussed options for patient.  At this time she is open to trying Ozempic.  Risk and benefits were reviewed.  Discussed the importance of adequate blood sugar control.  May have to start other agents if we are unable to get glucose down reasonably quickly.  She agreed to try to add a third metformin 500 mg dose in the evenings for a total of 1500 mg.  Will titrate up Ozempic monthly.  Follow-up for diabetes recheck in 3 months and as needed.    History of Present Illness       Diabetes:   She presents for follow up of diabetes.    She is not checking blood glucose.        She is concerned about other.   She is having excessive thirst.  The patient has not had a diabetic eye exam in the last 12 months.   "        She eats 2-3 servings of fruits and vegetables daily.She consumes 0 sweetened beverage(s) daily.She exercises with enough effort to increase her heart rate 9 or less minutes per day.  She exercises with enough effort to increase her heart rate 3 or less days per week.   She is taking medications regularly.    Reviewed and updated as needed this visit by Provider                 Review of Systems   Constitutional:  Negative for chills, fever and unexpected weight change.   HENT: Negative.     Respiratory:  Negative for chest tightness and shortness of breath.    Cardiovascular:  Negative for chest pain and palpitations.   Gastrointestinal:  Negative for abdominal pain, constipation and diarrhea.   Genitourinary:  Negative for dysuria.   Skin:  Negative for rash.     ============================================  Objective    /75   Pulse 93   Temp 98.4  F (36.9  C)   Resp 16   Ht 1.543 m (5' 0.75\")   Wt 68.9 kg (152 lb)   LMP 11/18/2013   SpO2 99%   BMI 28.96 kg/m    Physical Exam  Constitutional:       Appearance: Normal appearance.   Eyes:      Conjunctiva/sclera: Conjunctivae normal.   Cardiovascular:      Rate and Rhythm: Normal rate and regular rhythm.      Heart sounds: Normal heart sounds.   Pulmonary:      Effort: Pulmonary effort is normal.      Breath sounds: Normal breath sounds.   Skin:     General: Skin is warm and dry.      Findings: No rash.   Neurological:      Mental Status: She is alert.   Psychiatric:         Mood and Affect: Mood normal.        ============================================  CARL Alcazar CNP  Signed Electronically by: CARL Alcazar CNP          "

## 2024-07-11 NOTE — PATIENT INSTRUCTIONS
Try the metformin 2 tabs in the morning and one tab in the evening.    Monitor your sugars and let us know how they are  going.

## 2024-07-13 NOTE — ASSESSMENT & PLAN NOTE
Lab Results   Component Value Date    A1C 10.4 07/11/2024    A1C 9.9 03/22/2024    A1C 8.3 11/21/2023    A1C 8.4 03/13/2023    A1C 8.2 09/30/2022    A1C 9.5 05/25/2021    A1C 8.4 01/18/2021    A1C 7.6 10/19/2020    A1C 8.4 02/27/2020    A1C 6.9 07/19/2019     Has reduced her metformin to 1000 mg in the morning.  Feels that the metformin causes GI distress.  Has been unable to tolerate Jardiance due to severe vaginal infections.  Not checking home blood sugars.  No foot concerns.    Diabetes is improving/stable/worsening: worsening  Discussed options for patient.  At this time she is open to trying Ozempic.  Risk and benefits were reviewed.  Discussed the importance of adequate blood sugar control.  May have to start other agents if we are unable to get glucose down reasonably quickly.  She agreed to try to add a third metformin 500 mg dose in the evenings for a total of 1500 mg.  Will titrate up Ozempic monthly.  Follow-up for diabetes recheck in 3 months and as needed.

## 2024-07-18 ENCOUNTER — TRANSFERRED RECORDS (OUTPATIENT)
Dept: HEALTH INFORMATION MANAGEMENT | Facility: CLINIC | Age: 64
End: 2024-07-18
Payer: COMMERCIAL

## 2024-07-18 LAB — RETINOPATHY: NEGATIVE

## 2024-09-04 DIAGNOSIS — F41.9 ANXIETY: ICD-10-CM

## 2024-09-05 RX ORDER — VENLAFAXINE HYDROCHLORIDE 37.5 MG/1
CAPSULE, EXTENDED RELEASE ORAL
Qty: 90 CAPSULE | Refills: 0 | OUTPATIENT
Start: 2024-09-05

## 2024-09-05 NOTE — TELEPHONE ENCOUNTER
Most recently seen by Dr. Olvera for anxiety. Routing to provider to inquire about refill.    Carmen Fernandez RN on 9/5/2024 at 2:41 PM

## 2024-09-06 RX ORDER — VENLAFAXINE HYDROCHLORIDE 37.5 MG/1
37.5 CAPSULE, EXTENDED RELEASE ORAL DAILY
Qty: 30 CAPSULE | Refills: 0 | Status: SHIPPED | OUTPATIENT
Start: 2024-09-06 | End: 2024-10-04

## 2024-09-06 NOTE — TELEPHONE ENCOUNTER
Spoke with patient. Patient was unable to schedule at this time and will call back to schedule later today.     Snehal Mckenzie  Lead   [facility] [place]

## 2024-09-06 NOTE — TELEPHONE ENCOUNTER
One month fill provided, pt will need f/u appt for ongoing refill.  Please call to schedule DM and anxiety f/u.    José Luis Olvera MD

## 2024-09-13 DIAGNOSIS — E11.9 TYPE 2 DIABETES MELLITUS WITHOUT COMPLICATION, WITHOUT LONG-TERM CURRENT USE OF INSULIN (H): ICD-10-CM

## 2024-09-13 RX ORDER — SEMAGLUTIDE 0.68 MG/ML
0.5 INJECTION, SOLUTION SUBCUTANEOUS
Qty: 3 ML | Refills: 0 | Status: SHIPPED | OUTPATIENT
Start: 2024-09-13 | End: 2024-10-13

## 2024-10-04 DIAGNOSIS — F41.9 ANXIETY: ICD-10-CM

## 2024-10-04 RX ORDER — VENLAFAXINE HYDROCHLORIDE 37.5 MG/1
37.5 CAPSULE, EXTENDED RELEASE ORAL DAILY
Qty: 90 CAPSULE | Refills: 1 | OUTPATIENT
Start: 2024-10-04

## 2024-10-04 NOTE — TELEPHONE ENCOUNTER
Most recently seen by Triny Rodriguez NP. Also has upcoming appointment with [facility]. Will route refill request to her.    Carmen White RN on 10/4/2024 at 10:24 AM

## 2024-10-07 RX ORDER — VENLAFAXINE HYDROCHLORIDE 37.5 MG/1
37.5 CAPSULE, EXTENDED RELEASE ORAL DAILY
Qty: 90 CAPSULE | Refills: 0 | Status: SHIPPED | OUTPATIENT
Start: 2024-10-07

## 2024-10-14 ENCOUNTER — OFFICE VISIT (OUTPATIENT)
Dept: FAMILY MEDICINE | Facility: CLINIC | Age: 64
End: 2024-10-14
Attending: NURSE PRACTITIONER
Payer: COMMERCIAL

## 2024-10-14 VITALS
BODY MASS INDEX: 29.39 KG/M2 | RESPIRATION RATE: 17 BRPM | HEIGHT: 62 IN | DIASTOLIC BLOOD PRESSURE: 86 MMHG | WEIGHT: 159.7 LBS | HEART RATE: 88 BPM | TEMPERATURE: 97.9 F | OXYGEN SATURATION: 96 % | SYSTOLIC BLOOD PRESSURE: 137 MMHG

## 2024-10-14 DIAGNOSIS — E11.9 TYPE 2 DIABETES MELLITUS WITHOUT COMPLICATION, WITHOUT LONG-TERM CURRENT USE OF INSULIN (H): ICD-10-CM

## 2024-10-14 PROCEDURE — 99214 OFFICE O/P EST MOD 30 MIN: CPT | Performed by: NURSE PRACTITIONER

## 2024-10-14 PROCEDURE — G2211 COMPLEX E/M VISIT ADD ON: HCPCS | Performed by: NURSE PRACTITIONER

## 2024-10-14 RX ORDER — SEMAGLUTIDE 0.68 MG/ML
0.5 INJECTION, SOLUTION SUBCUTANEOUS
Qty: 3 ML | Refills: 0 | Status: CANCELLED | OUTPATIENT
Start: 2024-10-14

## 2024-10-14 ASSESSMENT — ENCOUNTER SYMPTOMS
PALPITATIONS: 0
ABDOMINAL PAIN: 0
DYSURIA: 0
CONSTIPATION: 0
UNEXPECTED WEIGHT CHANGE: 0
FEVER: 0
CHEST TIGHTNESS: 0
SHORTNESS OF BREATH: 0
CHILLS: 0

## 2024-10-14 NOTE — PROGRESS NOTES
Assessment & Plan   Type 2 diabetes mellitus without complication, without long-term current use of insulin (H)  Rx for metformin short acting 500 mg twice daily.  Patient wants to try this before she fully gives up on metformin as medication for her diabetes.  If diarrhea continues will fully discontinue all forms of metformin.  Increase semaglutide from 0.5 mg weekly to 1 mg weekly discussed possibility of constipation.  And the need to manage this appropriately should it occur.  Patient to work with her endocrinology clinic to get diabetes education support in place.  This is her preference to do that through endocrinology.  - metFORMIN (GLUCOPHAGE) 500 MG tablet  Dispense: 180 tablet; Refill: 1  - Semaglutide, 1 MG/DOSE, (OZEMPIC) 4 MG/3ML pen  Dispense: 9 mL; Refill: 0      The longitudinal plan of care for the diagnosis(es)/condition(s) as documented were addressed during this visit. Due to the added complexity in care, I will continue to support Teresa in the subsequent management and with ongoing continuity of care.    Return in about 3 months (around 1/14/2025) for In Clinic Follow Up.  Patient Instructions   Please start the short acting metformin once per day with food.  If that is going well then please try to go to twice per day with food.  However if the endocrinologist tells you to stop then please stop.    If you get constipated--missing BM for more than a day  Let us know.      CARL Alcazar CNP  [facility] [place]  ============================================  Subjective  Type 2 diabetes mellitus without complication, without long-term current use of insulin (H)  A1c levels are elevated.    On metformin XR 1000 mg bid, however patient was told by the endocrinologist that she established with this morning that she should stop her metformin.  Patient is unwilling to stop metformin and would like me to prescribe metformin short acting 500 twice daily for her.  Endocrine is  concerned with diarrhea.  They will be working up for celiac disease.  On Ozempic 0.5 mg weekly.  Patient is on Ozempic 0.5 mg weekly and tolerating it well without side effects.  She would like to increase this dose.  We discussed her managing with her endocrinologist and waiting for endocrinologist to manage this medication.  She would like to increase the dose of the Ozempic today.    Off Jardiance.  Significant side effects including recurrent vaginitis.  Patient also has concerns regarding her diet at home.  She does not like to cook and she struggles to find healthy foods that require minimal food prep at home.  Discussed diabetes education, and patient would like to get this through her endocrinology clinic.  Has been off ACE/ARB due to reluctance for starting new medications.  This will need to be addressed again, but patient currently having significant struggles with glucose control at this time we will defer to next visit.    Lab Results   Component Value Date    A1C 10.4 07/11/2024    A1C 9.9 03/22/2024    A1C 8.3 11/21/2023    A1C 8.4 03/13/2023    A1C 8.2 09/30/2022    A1C 9.5 05/25/2021    A1C 8.4 01/18/2021    A1C 7.6 10/19/2020    A1C 8.4 02/27/2020    A1C 6.9 07/19/2019         Wt Readings from Last 4 Encounters:   10/14/24 72.4 kg (159 lb 11.2 oz)   07/11/24 68.9 kg (152 lb)   03/22/24 68 kg (150 lb)   11/21/23 70.8 kg (156 lb)         History of Present Illness       Reason for visit:  Med CheckSgordon consumes 0 sweetened beverage(s) daily. She exercises with enough effort to increase her heart rate 3 or less days per week.   She is taking medications regularly.    Reviewed and updated as needed this visit by Provider                 Review of Systems   Constitutional:  Negative for chills, fever and unexpected weight change.   HENT: Negative.     Respiratory:  Negative for chest tightness and shortness of breath.    Cardiovascular:  Negative for chest pain and palpitations.   Gastrointestinal:   "Positive for diarrhea. Negative for abdominal pain and constipation.   Endocrine: Negative for polydipsia, polyphagia and polyuria.   Genitourinary:  Negative for dysuria and vaginal discharge.   Skin:  Negative for rash.     ============================================  Objective    /86 (BP Location: Right arm, Patient Position: Sitting, Cuff Size: Adult Regular)   Pulse 88   Temp 97.9  F (36.6  C) (Oral)   Resp 17   Ht 1.575 m (5' 2\")   Wt 72.4 kg (159 lb 11.2 oz)   LMP 11/18/2013   SpO2 96%   BMI 29.21 kg/m    Physical Exam  Constitutional:       Appearance: Normal appearance.   Cardiovascular:      Rate and Rhythm: Normal rate.   Pulmonary:      Effort: Pulmonary effort is normal.   Neurological:      Mental Status: She is alert.   Psychiatric:         Mood and Affect: Mood normal.        ============================================  CARL Alcazar CNP  Signed Electronically by: CARL Alcazar CNP          "

## 2024-10-14 NOTE — ASSESSMENT & PLAN NOTE
A1c levels are elevated.    On metformin XR 1000 mg bid, however patient was told by the endocrinologist that she established with this morning that she should stop her metformin.  Patient is unwilling to stop metformin and would like me to prescribe metformin short acting 500 twice daily for her.  Endocrine is concerned with diarrhea.  They will be working up for celiac disease.  On Ozempic 0.5 mg weekly.  Patient is on Ozempic 0.5 mg weekly and tolerating it well without side effects.  She would like to increase this dose.  We discussed her managing with her endocrinologist and waiting for endocrinologist to manage this medication.  She would like to increase the dose of the Ozempic today.    Off Jardiance.  Significant side effects including recurrent vaginitis.  Patient also has concerns regarding her diet at home.  She does not like to cook and she struggles to find healthy foods that require minimal food prep at home.  Discussed diabetes education, and patient would like to get this through her endocrinology clinic.  Has been off ACE/ARB due to reluctance for starting new medications.  This will need to be addressed again, but patient currently having significant struggles with glucose control at this time we will defer to next visit.    Lab Results   Component Value Date    A1C 10.4 07/11/2024    A1C 9.9 03/22/2024    A1C 8.3 11/21/2023    A1C 8.4 03/13/2023    A1C 8.2 09/30/2022    A1C 9.5 05/25/2021    A1C 8.4 01/18/2021    A1C 7.6 10/19/2020    A1C 8.4 02/27/2020    A1C 6.9 07/19/2019         Wt Readings from Last 4 Encounters:   10/14/24 72.4 kg (159 lb 11.2 oz)   07/11/24 68.9 kg (152 lb)   03/22/24 68 kg (150 lb)   11/21/23 70.8 kg (156 lb)

## 2024-10-14 NOTE — PATIENT INSTRUCTIONS
Please start the short acting metformin once per day with food.  If that is going well then please try to go to twice per day with food.  However if the endocrinologist tells you to stop then please stop.    If you get constipated--missing BM for more than a day  Let us know.

## 2024-10-15 PROBLEM — Z98.890 STATUS POST RIGHT BREAST RECONSTRUCTION: Status: RESOLVED | Noted: 2017-03-17 | Resolved: 2024-10-15

## 2024-10-15 PROBLEM — R19.8 IRREGULAR BOWEL HABITS: Status: ACTIVE | Noted: 2024-10-15

## 2024-10-15 PROBLEM — Z96.669 S/P ANKLE JOINT REPLACEMENT: Status: RESOLVED | Noted: 2022-10-17 | Resolved: 2024-10-15

## 2024-10-15 ASSESSMENT — ENCOUNTER SYMPTOMS
DIARRHEA: 1
POLYPHAGIA: 0
POLYDIPSIA: 0

## 2024-12-07 DIAGNOSIS — E11.9 TYPE 2 DIABETES MELLITUS WITHOUT COMPLICATION, WITHOUT LONG-TERM CURRENT USE OF INSULIN (H): ICD-10-CM

## 2024-12-09 RX ORDER — METFORMIN HYDROCHLORIDE 500 MG/1
1000 TABLET, EXTENDED RELEASE ORAL 2 TIMES DAILY WITH MEALS
Qty: 360 TABLET | Refills: 1 | OUTPATIENT
Start: 2024-12-09

## 2025-01-10 ENCOUNTER — TRANSFERRED RECORDS (OUTPATIENT)
Dept: MULTI SPECIALTY CLINIC | Facility: CLINIC | Age: 65
End: 2025-01-10

## 2025-01-25 ENCOUNTER — HEALTH MAINTENANCE LETTER (OUTPATIENT)
Age: 65
End: 2025-01-25

## 2025-03-22 ENCOUNTER — HEALTH MAINTENANCE LETTER (OUTPATIENT)
Age: 65
End: 2025-03-22

## 2025-04-01 DIAGNOSIS — F41.9 ANXIETY: ICD-10-CM

## 2025-04-02 RX ORDER — VENLAFAXINE HYDROCHLORIDE 37.5 MG/1
37.5 CAPSULE, EXTENDED RELEASE ORAL DAILY
Qty: 90 CAPSULE | Refills: 0 | Status: SHIPPED | OUTPATIENT
Start: 2025-04-02

## 2025-05-07 DIAGNOSIS — F41.9 ANXIETY: ICD-10-CM

## 2025-05-07 DIAGNOSIS — E11.9 TYPE 2 DIABETES MELLITUS WITHOUT COMPLICATION, WITHOUT LONG-TERM CURRENT USE OF INSULIN (H): ICD-10-CM

## 2025-05-08 RX ORDER — VENLAFAXINE HYDROCHLORIDE 37.5 MG/1
37.5 CAPSULE, EXTENDED RELEASE ORAL DAILY
Qty: 90 CAPSULE | Refills: 0 | Status: SHIPPED | OUTPATIENT
Start: 2025-05-08

## 2025-05-08 RX ORDER — SEMAGLUTIDE 1.34 MG/ML
1 INJECTION, SOLUTION SUBCUTANEOUS
Qty: 6 ML | Refills: 0 | Status: SHIPPED | OUTPATIENT
Start: 2025-05-08 | End: 2025-07-07

## 2025-05-08 NOTE — TELEPHONE ENCOUNTER
ANNM to call back for an appointment. Two more attempts will be made.     Katherine Sanchez    [facility]

## 2025-05-08 NOTE — TELEPHONE ENCOUNTER
Please reach out to pt.  She is due for a follow up visit visit.  I refilled her meds for 2 months.  Please schedule with either me or Ros.    Thanks.

## 2025-06-14 ENCOUNTER — HEALTH MAINTENANCE LETTER (OUTPATIENT)
Age: 65
End: 2025-06-14

## 2025-06-22 ASSESSMENT — ANXIETY QUESTIONNAIRES
8. IF YOU CHECKED OFF ANY PROBLEMS, HOW DIFFICULT HAVE THESE MADE IT FOR YOU TO DO YOUR WORK, TAKE CARE OF THINGS AT HOME, OR GET ALONG WITH OTHER PEOPLE?: NOT DIFFICULT AT ALL
7. FEELING AFRAID AS IF SOMETHING AWFUL MIGHT HAPPEN: SEVERAL DAYS
3. WORRYING TOO MUCH ABOUT DIFFERENT THINGS: NOT AT ALL
GAD7 TOTAL SCORE: 3
GAD7 TOTAL SCORE: 3
1. FEELING NERVOUS, ANXIOUS, OR ON EDGE: SEVERAL DAYS
4. TROUBLE RELAXING: NOT AT ALL
6. BECOMING EASILY ANNOYED OR IRRITABLE: NOT AT ALL
5. BEING SO RESTLESS THAT IT IS HARD TO SIT STILL: NOT AT ALL
2. NOT BEING ABLE TO STOP OR CONTROL WORRYING: SEVERAL DAYS
IF YOU CHECKED OFF ANY PROBLEMS ON THIS QUESTIONNAIRE, HOW DIFFICULT HAVE THESE PROBLEMS MADE IT FOR YOU TO DO YOUR WORK, TAKE CARE OF THINGS AT HOME, OR GET ALONG WITH OTHER PEOPLE: NOT DIFFICULT AT ALL

## 2025-06-23 ENCOUNTER — OFFICE VISIT (OUTPATIENT)
Dept: FAMILY MEDICINE | Facility: CLINIC | Age: 65
End: 2025-06-23
Payer: COMMERCIAL

## 2025-06-23 VITALS
WEIGHT: 145.1 LBS | HEART RATE: 82 BPM | OXYGEN SATURATION: 97 % | BODY MASS INDEX: 26.7 KG/M2 | DIASTOLIC BLOOD PRESSURE: 78 MMHG | RESPIRATION RATE: 17 BRPM | HEIGHT: 62 IN | SYSTOLIC BLOOD PRESSURE: 125 MMHG | TEMPERATURE: 99.5 F

## 2025-06-23 DIAGNOSIS — E11.9 TYPE 2 DIABETES MELLITUS WITHOUT COMPLICATION, WITHOUT LONG-TERM CURRENT USE OF INSULIN (H): ICD-10-CM

## 2025-06-23 DIAGNOSIS — Z13.6 SCREENING FOR CARDIOVASCULAR CONDITION: ICD-10-CM

## 2025-06-23 DIAGNOSIS — E78.5 HYPERLIPIDEMIA ASSOCIATED WITH TYPE 2 DIABETES MELLITUS (H): ICD-10-CM

## 2025-06-23 DIAGNOSIS — E11.69 HYPERLIPIDEMIA ASSOCIATED WITH TYPE 2 DIABETES MELLITUS (H): ICD-10-CM

## 2025-06-23 DIAGNOSIS — F41.9 ANXIETY: Primary | ICD-10-CM

## 2025-06-23 PROCEDURE — 99214 OFFICE O/P EST MOD 30 MIN: CPT | Performed by: NURSE PRACTITIONER

## 2025-06-23 PROCEDURE — G2211 COMPLEX E/M VISIT ADD ON: HCPCS | Performed by: NURSE PRACTITIONER

## 2025-06-23 PROCEDURE — 1126F AMNT PAIN NOTED NONE PRSNT: CPT | Performed by: NURSE PRACTITIONER

## 2025-06-23 PROCEDURE — 3078F DIAST BP <80 MM HG: CPT | Performed by: NURSE PRACTITIONER

## 2025-06-23 PROCEDURE — 3074F SYST BP LT 130 MM HG: CPT | Performed by: NURSE PRACTITIONER

## 2025-06-23 RX ORDER — VENLAFAXINE HYDROCHLORIDE 37.5 MG/1
37.5 CAPSULE, EXTENDED RELEASE ORAL DAILY
Qty: 90 CAPSULE | Refills: 3 | Status: SHIPPED | OUTPATIENT
Start: 2025-06-23

## 2025-06-23 ASSESSMENT — ENCOUNTER SYMPTOMS
CARDIOVASCULAR NEGATIVE: 1
RESPIRATORY NEGATIVE: 1
NERVOUS/ANXIOUS: 1
CONSTITUTIONAL NEGATIVE: 1

## 2025-06-23 ASSESSMENT — PAIN SCALES - GENERAL: PAINLEVEL_OUTOF10: NO PAIN (0)

## 2025-06-23 NOTE — PATIENT INSTRUCTIONS
Please let me know what medication you got from endocrine that you are concerned about    If you try to take magnesium I recommend a formulation like magnesium glycinate    If you decide you would like to add buspirone for anxiety please let me know.    Come back for an A1c in a month---the orders are there for you

## 2025-06-23 NOTE — ASSESSMENT & PLAN NOTE
On atorvastatin at 10 mg daily.  Last LDL checked by endocrinology.  LDL 76.  Triglycerides 309 and suspect these will come down as her glucose improves.

## 2025-06-23 NOTE — ASSESSMENT & PLAN NOTE
On semaglutide 1 mg weekly and is off metformin at this time.  Weight is stable.  Wt Readings from Last 4 Encounters:   10/14/24 72.4 kg (159 lb 11.2 oz)   07/11/24 68.9 kg (152 lb)   03/22/24 68 kg (150 lb)   11/21/23 70.8 kg (156 lb)       Is up to 2 mg per day on semaglutide and is hoping her A1c will improve.  Would like to have lab only to check it here as her next appoitnment is not for 6 months with endocrine.  Last a1 was 9.3 4/22/2025 at endocrine

## 2025-06-23 NOTE — ASSESSMENT & PLAN NOTE
Much improved at this time.  Is on venlafaxine 37.5 mg daily.          12/1/2022     7:48 AM 3/22/2024    10:31 AM 6/22/2025     7:38 PM   CHAKA-7 SCORE   Total Score  3 (minimal anxiety) 3 (minimal anxiety)   Total Score 5 3 3        Patient-reported         12/1/2022     7:48 AM 11/13/2023     3:27 PM 3/22/2024    10:30 AM   PHQ   PHQ-9 Total Score 2 2 1   Q9: Thoughts of better off dead/self-harm past 2 weeks Not at all Not at all Not at all     Patient is unhappy with sexual side effects of venlafaxine.  Is interested in considering other medications for anxiety.  Discussed buspirone and pt will consider this as an option.

## 2025-06-23 NOTE — PROGRESS NOTES
Assessment & Plan   Anxiety   Refills for a year  Declines any change in meds at this time.  Some concern with insomnia and would like information on concerns with long term us of diphenhydramine  - venlafaxine (EFFEXOR XR) 37.5 MG 24 hr capsule  Dispense: 90 capsule; Refill: 3    Type 2 diabetes mellitus without complication, without long-term current use of insulin (H)   Managed by endocrine  A1c in one month at pt request  - Hemoglobin A1c    Hyperlipidemia associated with type 2 diabetes mellitus (H)   Reasonable LDL control with atorvastatin 10.  Recheck again with improved glucose control.    Would consider dose increase to maximize ascvd prevention.    The longitudinal plan of care for the diagnosis(es)/condition(s) as documented were addressed during this visit. Due to the added complexity in care, I will continue to support Teresa in the subsequent management and with ongoing continuity of care.    Follow-up    Follow-up Visit   Expected date:  Dec 23, 2025 (Approximate)      Follow Up Appointment Details:     Follow-up with whom?: Me    Follow-Up for what?: Chronic Disease f/u    Chronic Disease f/u: General (Other)    How?: In Person    Is this an as-needed follow-up?: No               Patient Instructions   Please let me know what medication you got from endocrine that you are concerned about    If you try to take magnesium I recommend a formulation like magnesium glycinate    If you decide you would like to add buspirone for anxiety please let me know.    Come back for an A1c in a month---the orders are there for you  CARL Alcazar [facility] [place]  ============================================  Subjective  Anxiety  Much improved at this time.  Is on venlafaxine 37.5 mg daily.          12/1/2022     7:48 AM 3/22/2024    10:31 AM 6/22/2025     7:38 PM   CHAKA-7 SCORE   Total Score  3 (minimal anxiety) 3 (minimal anxiety)   Total Score 5 3 3        Patient-reported          12/1/2022     7:48 AM 11/13/2023     3:27 PM 3/22/2024    10:30 AM   PHQ   PHQ-9 Total Score 2 2 1   Q9: Thoughts of better off dead/self-harm past 2 weeks Not at all Not at all Not at all     Patient is unhappy with sexual side effects of venlafaxine.  Is interested in considering other medications for anxiety.  Discussed buspirone and pt will consider this as an option.    Type 2 diabetes mellitus without complication, without long-term current use of insulin (H)  On semaglutide 1 mg weekly and is off metformin at this time.  Weight is stable.  Wt Readings from Last 4 Encounters:   10/14/24 72.4 kg (159 lb 11.2 oz)   07/11/24 68.9 kg (152 lb)   03/22/24 68 kg (150 lb)   11/21/23 70.8 kg (156 lb)       Is up to 2 mg per day on semaglutide and is hoping her A1c will improve.  Would like to have lab only to check it here as her next appoitnment is not for 6 months with endocrine.  Last a1 was 9.3 4/22/2025 at endocrine    Hyperlipidemia associated with type 2 diabetes mellitus (H)  On atorvastatin at 10 mg daily.  Last LDL checked by endocrinology.  LDL 76.  Triglycerides 309 and suspect these will come down as her glucose improves.    History of Present Illness       Mental Health Follow-up:  Patient presents to follow-up on Anxiety.    Patient's anxiety since last visit has been:  Better  The patient is not having other symptoms associated with anxiety.  Any significant life events: No and health concerns  Patient is not feeling anxious or having panic attacks.  Patient has no concerns about alcohol or drug use.    Diabetes:   She presents for follow up of diabetes.    She is not checking blood glucose.        She is concerned about other.   She is having excessive thirst.  The patient has had a diabetic eye exam in the last 12 months. Eye exam performed on 07/2024. Location of last eye exam [place], [place].    She consumes 0 sweetened beverage(s) daily. She exercises with enough effort to increase her heart rate 3  "or less days per week.   She is taking medications regularly.    Reviewed by Provider at this visit   Tobacco  Allergies  Meds  Problems  Med Hx  Surg Hx  Fam Hx  Soc   Hx Sexual Activity       Review of Systems   Constitutional: Negative.    Respiratory: Negative.     Cardiovascular: Negative.    Gastrointestinal:         Some diarrhea with metformin.  Improved off it.   Psychiatric/Behavioral:  Negative for mood changes. The patient is nervous/anxious.      ============================================  Objective    /78 (BP Location: Right arm, Patient Position: Sitting, Cuff Size: Adult Regular)   Pulse 82   Temp 99.5  F (37.5  C) (Oral)   Resp 17   Ht 1.575 m (5' 2\")   Wt 65.8 kg (145 lb 1.6 oz)   LMP 11/18/2013   SpO2 97%   BMI 26.54 kg/m    Physical Exam  Constitutional:       Appearance: Normal appearance.   HENT:      Right Ear: Tympanic membrane normal.      Left Ear: Tympanic membrane normal.      Nose: Nose normal.      Mouth/Throat:      Mouth: Mucous membranes are moist.      Pharynx: Oropharynx is clear.   Eyes:      Conjunctiva/sclera: Conjunctivae normal.   Cardiovascular:      Rate and Rhythm: Normal rate and regular rhythm.      Heart sounds: Normal heart sounds.   Pulmonary:      Effort: Pulmonary effort is normal.      Breath sounds: Normal breath sounds.   Abdominal:      General: Abdomen is flat. Bowel sounds are normal.      Palpations: Abdomen is soft.      Tenderness: There is no abdominal tenderness.   Musculoskeletal:      Cervical back: Neck supple.      Right lower leg: No edema.      Left lower leg: No edema.   Skin:     General: Skin is warm and dry.      Findings: No rash.   Neurological:      Mental Status: She is alert.   Psychiatric:         Mood and Affect: Mood normal.      Comments: Appears mildly anxious today.        ============================================  CARL Alcazar CNP  Signed Electronically by: CARL Alcazar CNP          "

## 2025-07-26 ENCOUNTER — HEALTH MAINTENANCE LETTER (OUTPATIENT)
Age: 65
End: 2025-07-26

## 2025-08-26 ENCOUNTER — LAB REQUISITION (OUTPATIENT)
Dept: LAB | Facility: CLINIC | Age: 65
End: 2025-08-26

## 2025-08-26 DIAGNOSIS — N76.6 ULCERATION OF VULVA: ICD-10-CM

## 2025-08-26 PROCEDURE — 86695 HERPES SIMPLEX TYPE 1 TEST: CPT | Performed by: OBSTETRICS & GYNECOLOGY

## 2025-08-26 PROCEDURE — 87529 HSV DNA AMP PROBE: CPT | Performed by: OBSTETRICS & GYNECOLOGY

## 2025-08-27 LAB
HSV1 DNA SPEC QL NAA+PROBE: DETECTED
HSV1 IGG SERPL QL IA: <0.01 INDEX
HSV1 IGG SERPL QL IA: NORMAL
HSV2 DNA SPEC QL NAA+PROBE: NOT DETECTED
HSV2 IGG SERPL QL IA: 0.04 INDEX
HSV2 IGG SERPL QL IA: NORMAL
SPECIMEN TYPE: ABNORMAL

## (undated) DEVICE — PACK EXTREMITY SOP15EXFSD

## (undated) DEVICE — SU VICRYL 2-0 CT-2 27" UND J269H

## (undated) DEVICE — DRSG KERLIX 4 1/2"X4YDS ROLL 6715

## (undated) DEVICE — SU MONOCRYL 4-0 PS-2 18" UND Y496G

## (undated) DEVICE — NDL SPINAL 22GA 3.5" QUINCKE 405181

## (undated) DEVICE — SUCTION CANISTER MEDIVAC LINER 3000ML W/LID 65651-530

## (undated) DEVICE — SPONGE LAP 18X18" X8435

## (undated) DEVICE — SU PDS II 2-0 CT-1 27" Z339H

## (undated) DEVICE — ADH LIQUID MASTISOL TOPICAL VIAL 2-3ML 0523-48

## (undated) DEVICE — BLADE SAW RECIP STRK 70X6X0.025MM 0277-096-250S5

## (undated) DEVICE — CAST PADDING 4" UNSTERILE 9044

## (undated) DEVICE — BNDG ELASTIC 4" DBL LENGTH UNSTERILE 6611-14

## (undated) DEVICE — PACK MAJOR SBA15MAFSI

## (undated) DEVICE — DRSG GAUZE 4X4" 3033

## (undated) DEVICE — BLADE KNIFE SURG 15 371115

## (undated) DEVICE — DRSG STERI STRIP 1X5" R1548

## (undated) DEVICE — ESU PENCIL W/SMOKE EVAC E2515HS

## (undated) DEVICE — DRAPE COVER C-ARM SEAMLESS SNAP-KAP 03-KP26 LATEX FREE

## (undated) DEVICE — BLADE SAW SAGITTAL STRK SHORT 35.5X9X0.64MM 2108-148-000

## (undated) DEVICE — DRAPE SHEET REV FOLD 3/4 9349

## (undated) DEVICE — ESU PENCIL W/SMOKE EVAC NEPTUNE STRYKER 0703-046-000

## (undated) DEVICE — PIN PACK SALTO TALARIS 3MM LJU095

## (undated) DEVICE — GLOVE PROTEXIS BLUE W/NEU-THERA 7.5  2D73EB75

## (undated) DEVICE — BLADE SAW LG BONE TORNIER 70X13X1.27MM SAW6944T

## (undated) DEVICE — SET OF 12 PINS +1 REAMER FOR SALTO ANKLE LJV-529T

## (undated) DEVICE — DRAPE BREAST/CHEST 29420

## (undated) DEVICE — SU VICRYL 3-0 PS-1 18" UND J683

## (undated) DEVICE — PREP SKIN SCRUB TRAY 4461A

## (undated) DEVICE — BNDG ELASTIC 6" DBL LENGTH UNSTERILE 6611-16

## (undated) DEVICE — DRSG ABDOMINAL 07 1/2X8" 7197D

## (undated) DEVICE — SOL WATER IRRIG 1000ML BOTTLE 2F7114

## (undated) DEVICE — BLADE KNIFE SURG 10 371110

## (undated) DEVICE — IMM LIMB ELEVATOR DC40-0203

## (undated) DEVICE — STPL SKIN 35W ROTATING HEAD PRW35

## (undated) DEVICE — LINEN TOWEL PACK X5 5464

## (undated) DEVICE — DRILL BIT L135 MM OD3 MM LJV528T

## (undated) DEVICE — PREP DURAPREP 26ML APL 8630

## (undated) DEVICE — CAST PLASTER SPLINT 5X30" 7395

## (undated) DEVICE — DRILL BIT TORNIER 3MM LONG DWD060

## (undated) DEVICE — SOL NACL 0.9% IRRIG 1000ML BOTTLE 2F7124

## (undated) DEVICE — PAD CHUX UNDERPAD 23X24" 7136

## (undated) DEVICE — PEN MARKING SKIN W/LABELS 31145884

## (undated) DEVICE — SU MONOCRYL 3-0 PS-2 27" Y427H

## (undated) DEVICE — DRSG ADAPTIC 3X8" 6113

## (undated) DEVICE — CAST PADDING 4" STERILE 9044S

## (undated) DEVICE — MANIFOLD NEPTUNE 4 PORT 700-20

## (undated) DEVICE — BLADE SAW TORNIER NARROW 11-2614

## (undated) DEVICE — GOWN XXLG REINFORCED 9071EL

## (undated) RX ORDER — HYDROCODONE BITARTRATE AND ACETAMINOPHEN 5; 325 MG/1; MG/1
TABLET ORAL
Status: DISPENSED
Start: 2017-12-28

## (undated) RX ORDER — ONDANSETRON 2 MG/ML
INJECTION INTRAMUSCULAR; INTRAVENOUS
Status: DISPENSED
Start: 2022-10-17

## (undated) RX ORDER — DEXAMETHASONE SODIUM PHOSPHATE 4 MG/ML
INJECTION, SOLUTION INTRA-ARTICULAR; INTRALESIONAL; INTRAMUSCULAR; INTRAVENOUS; SOFT TISSUE
Status: DISPENSED
Start: 2022-10-17

## (undated) RX ORDER — FENTANYL CITRATE 50 UG/ML
INJECTION, SOLUTION INTRAMUSCULAR; INTRAVENOUS
Status: DISPENSED
Start: 2022-10-17

## (undated) RX ORDER — SCOLOPAMINE TRANSDERMAL SYSTEM 1 MG/1
PATCH, EXTENDED RELEASE TRANSDERMAL
Status: DISPENSED
Start: 2017-12-28

## (undated) RX ORDER — ACETAMINOPHEN 10 MG/ML
INJECTION, SOLUTION INTRAVENOUS
Status: DISPENSED
Start: 2017-12-28

## (undated) RX ORDER — FENTANYL CITRATE 50 UG/ML
INJECTION, SOLUTION INTRAMUSCULAR; INTRAVENOUS
Status: DISPENSED
Start: 2017-12-28

## (undated) RX ORDER — CEFAZOLIN SODIUM 2 G/100ML
INJECTION, SOLUTION INTRAVENOUS
Status: DISPENSED
Start: 2017-12-28

## (undated) RX ORDER — CELECOXIB 200 MG/1
CAPSULE ORAL
Status: DISPENSED
Start: 2017-12-28

## (undated) RX ORDER — DEXAMETHASONE SODIUM PHOSPHATE 4 MG/ML
INJECTION, SOLUTION INTRA-ARTICULAR; INTRALESIONAL; INTRAMUSCULAR; INTRAVENOUS; SOFT TISSUE
Status: DISPENSED
Start: 2017-12-28

## (undated) RX ORDER — ONDANSETRON 2 MG/ML
INJECTION INTRAMUSCULAR; INTRAVENOUS
Status: DISPENSED
Start: 2017-12-28

## (undated) RX ORDER — PROPOFOL 10 MG/ML
INJECTION, EMULSION INTRAVENOUS
Status: DISPENSED
Start: 2017-12-28

## (undated) RX ORDER — LIDOCAINE HYDROCHLORIDE 20 MG/ML
INJECTION, SOLUTION EPIDURAL; INFILTRATION; INTRACAUDAL; PERINEURAL
Status: DISPENSED
Start: 2017-12-28

## (undated) RX ORDER — PROPOFOL 10 MG/ML
INJECTION, EMULSION INTRAVENOUS
Status: DISPENSED
Start: 2022-10-17

## (undated) RX ORDER — LIDOCAINE HYDROCHLORIDE 20 MG/ML
INJECTION, SOLUTION EPIDURAL; INFILTRATION; INTRACAUDAL; PERINEURAL
Status: DISPENSED
Start: 2022-10-17